# Patient Record
Sex: MALE | Race: WHITE | Employment: FULL TIME | URBAN - METROPOLITAN AREA
[De-identification: names, ages, dates, MRNs, and addresses within clinical notes are randomized per-mention and may not be internally consistent; named-entity substitution may affect disease eponyms.]

---

## 2019-03-25 ENCOUNTER — INITIAL CONSULT (OUTPATIENT)
Dept: SURGERY | Age: 39
End: 2019-03-25
Payer: COMMERCIAL

## 2019-03-25 VITALS
BODY MASS INDEX: 28.31 KG/M2 | SYSTOLIC BLOOD PRESSURE: 136 MMHG | DIASTOLIC BLOOD PRESSURE: 89 MMHG | HEIGHT: 72 IN | WEIGHT: 209 LBS | HEART RATE: 90 BPM

## 2019-03-25 DIAGNOSIS — K42.9 UMBILICAL HERNIA WITHOUT OBSTRUCTION AND WITHOUT GANGRENE: Primary | ICD-10-CM

## 2019-03-25 PROCEDURE — 99203 OFFICE O/P NEW LOW 30 MIN: CPT | Performed by: SURGERY

## 2019-03-26 ENCOUNTER — TELEPHONE (OUTPATIENT)
Dept: SURGERY | Age: 39
End: 2019-03-26

## 2019-04-11 RX ORDER — AMLODIPINE BESYLATE 10 MG/1
10 TABLET ORAL DAILY
COMMUNITY
End: 2020-12-09 | Stop reason: SDUPTHER

## 2019-04-11 RX ORDER — ESOMEPRAZOLE MAGNESIUM 20 MG/1
20 FOR SUSPENSION ORAL DAILY
COMMUNITY
End: 2020-11-16 | Stop reason: ALTCHOICE

## 2019-04-11 RX ORDER — MECLIZINE HCL 12.5 MG/1
12.5 TABLET ORAL DAILY
COMMUNITY
End: 2020-09-23 | Stop reason: ALTCHOICE

## 2019-04-11 RX ORDER — TRAMADOL HYDROCHLORIDE 50 MG/1
50 TABLET ORAL EVERY 6 HOURS PRN
COMMUNITY
End: 2020-09-23 | Stop reason: ALTCHOICE

## 2019-04-11 RX ORDER — ALLOPURINOL 100 MG/1
100 TABLET ORAL DAILY
COMMUNITY
End: 2020-12-09 | Stop reason: SDUPTHER

## 2019-04-11 RX ORDER — CITALOPRAM 10 MG/1
10 TABLET ORAL DAILY
COMMUNITY
End: 2020-09-23 | Stop reason: DRUGHIGH

## 2019-04-11 NOTE — PROGRESS NOTES
Obstructive Sleep Apnea (JUAN) Screening     Patient:  Gela Jean    YOB: 1980      Medical Record #:  [id number]                     Date:  4/11/2019     1. Are you a loud and/or regular snorer? []  Yes       [x] No    2. Have you been observed to gasp or stop breathing during sleep? []  Yes       [x] No    3. Do you feel tired or groggy upon awakening or do you awaken with a headache?           []  Yes       [x] No    4. Are you often tired or fatigued during the wake time hours? []  Yes       [x] No    5. Do you fall asleep sitting, reading, watching TV or driving? []  Yes       [x] No    6. Do you often have problems with memory or concentration? []  Yes       [x] No    **If patient's score is ? 3 they are considered high risk for JUAN. Notify the anesthesiologist of the high risk and document in focus note. Note:  If the patient's BMI is more than 35 kg m¯² , has neck circumference > 40 cm, and/or high blood pressure the risk is greater (© American [employer]nea Association, 2006).

## 2019-04-16 ENCOUNTER — ANESTHESIA EVENT (OUTPATIENT)
Dept: OPERATING ROOM | Age: 39
End: 2019-04-16
Payer: COMMERCIAL

## 2019-04-17 ENCOUNTER — ANESTHESIA (OUTPATIENT)
Dept: OPERATING ROOM | Age: 39
End: 2019-04-17
Payer: COMMERCIAL

## 2019-04-17 ENCOUNTER — HOSPITAL ENCOUNTER (OUTPATIENT)
Age: 39
Setting detail: OUTPATIENT SURGERY
Discharge: HOME | End: 2019-04-17
Attending: SURGERY | Admitting: SURGERY
Payer: COMMERCIAL

## 2019-04-17 VITALS
DIASTOLIC BLOOD PRESSURE: 72 MMHG | OXYGEN SATURATION: 89 % | TEMPERATURE: 96.3 F | SYSTOLIC BLOOD PRESSURE: 116 MMHG | RESPIRATION RATE: 8 BRPM

## 2019-04-17 VITALS
WEIGHT: 201.7 LBS | TEMPERATURE: 97.4 F | HEIGHT: 71 IN | RESPIRATION RATE: 16 BRPM | HEART RATE: 85 BPM | OXYGEN SATURATION: 95 % | SYSTOLIC BLOOD PRESSURE: 118 MMHG | BODY MASS INDEX: 28.24 KG/M2 | DIASTOLIC BLOOD PRESSURE: 78 MMHG

## 2019-04-17 DIAGNOSIS — Z09 S/P UMBILICAL HERNIA REPAIR, FOLLOW-UP EXAM: Primary | ICD-10-CM

## 2019-04-17 PROCEDURE — 49585 REPAIR UMBILICAL HERN,5+Y/O,REDUC: CPT | Performed by: SURGERY

## 2019-04-17 PROCEDURE — 7100000011 HC PHASE II RECOVERY - ADDTL 15 MIN: Performed by: SURGERY

## 2019-04-17 PROCEDURE — 2709999900 HC NON-CHARGEABLE SUPPLY: Performed by: SURGERY

## 2019-04-17 PROCEDURE — 7100000001 HC PACU RECOVERY - ADDTL 15 MIN: Performed by: SURGERY

## 2019-04-17 PROCEDURE — 6360000002 HC RX W HCPCS: Performed by: NURSE ANESTHETIST, CERTIFIED REGISTERED

## 2019-04-17 PROCEDURE — 7100000000 HC PACU RECOVERY - FIRST 15 MIN: Performed by: SURGERY

## 2019-04-17 PROCEDURE — 3600000014 HC SURGERY LEVEL 4 ADDTL 15MIN: Performed by: SURGERY

## 2019-04-17 PROCEDURE — 2500000003 HC RX 250 WO HCPCS: Performed by: SURGERY

## 2019-04-17 PROCEDURE — 6360000002 HC RX W HCPCS: Performed by: SURGERY

## 2019-04-17 PROCEDURE — 3700000000 HC ANESTHESIA ATTENDED CARE: Performed by: SURGERY

## 2019-04-17 PROCEDURE — 2580000003 HC RX 258: Performed by: ANESTHESIOLOGY

## 2019-04-17 PROCEDURE — 2500000003 HC RX 250 WO HCPCS: Performed by: NURSE ANESTHETIST, CERTIFIED REGISTERED

## 2019-04-17 PROCEDURE — 7100000010 HC PHASE II RECOVERY - FIRST 15 MIN: Performed by: SURGERY

## 2019-04-17 PROCEDURE — 93010 ELECTROCARDIOGRAM REPORT: CPT | Performed by: INTERNAL MEDICINE

## 2019-04-17 PROCEDURE — 93005 ELECTROCARDIOGRAM TRACING: CPT | Performed by: ANESTHESIOLOGY

## 2019-04-17 PROCEDURE — 2580000003 HC RX 258: Performed by: SURGERY

## 2019-04-17 PROCEDURE — 3700000001 HC ADD 15 MINUTES (ANESTHESIA): Performed by: SURGERY

## 2019-04-17 PROCEDURE — 3600000004 HC SURGERY LEVEL 4 BASE: Performed by: SURGERY

## 2019-04-17 RX ORDER — FENTANYL CITRATE 50 UG/ML
INJECTION, SOLUTION INTRAMUSCULAR; INTRAVENOUS PRN
Status: DISCONTINUED | OUTPATIENT
Start: 2019-04-17 | End: 2019-04-17 | Stop reason: SDUPTHER

## 2019-04-17 RX ORDER — MIDAZOLAM HYDROCHLORIDE 1 MG/ML
INJECTION INTRAMUSCULAR; INTRAVENOUS PRN
Status: DISCONTINUED | OUTPATIENT
Start: 2019-04-17 | End: 2019-04-17 | Stop reason: SDUPTHER

## 2019-04-17 RX ORDER — BUPIVACAINE HYDROCHLORIDE AND EPINEPHRINE 5; 5 MG/ML; UG/ML
INJECTION, SOLUTION PERINEURAL PRN
Status: DISCONTINUED | OUTPATIENT
Start: 2019-04-17 | End: 2019-04-17 | Stop reason: ALTCHOICE

## 2019-04-17 RX ORDER — DEXAMETHASONE SODIUM PHOSPHATE 4 MG/ML
INJECTION, SOLUTION INTRA-ARTICULAR; INTRALESIONAL; INTRAMUSCULAR; INTRAVENOUS; SOFT TISSUE PRN
Status: DISCONTINUED | OUTPATIENT
Start: 2019-04-17 | End: 2019-04-17 | Stop reason: SDUPTHER

## 2019-04-17 RX ORDER — SODIUM CHLORIDE 0.9 % (FLUSH) 0.9 %
10 SYRINGE (ML) INJECTION EVERY 12 HOURS SCHEDULED
Status: DISCONTINUED | OUTPATIENT
Start: 2019-04-17 | End: 2019-04-17 | Stop reason: HOSPADM

## 2019-04-17 RX ORDER — PROPOFOL 10 MG/ML
INJECTION, EMULSION INTRAVENOUS PRN
Status: DISCONTINUED | OUTPATIENT
Start: 2019-04-17 | End: 2019-04-17 | Stop reason: SDUPTHER

## 2019-04-17 RX ORDER — MEPERIDINE HYDROCHLORIDE 50 MG/ML
12.5 INJECTION INTRAMUSCULAR; INTRAVENOUS; SUBCUTANEOUS EVERY 5 MIN PRN
Status: DISCONTINUED | OUTPATIENT
Start: 2019-04-17 | End: 2019-04-17 | Stop reason: HOSPADM

## 2019-04-17 RX ORDER — OXYCODONE HYDROCHLORIDE AND ACETAMINOPHEN 5; 325 MG/1; MG/1
1 TABLET ORAL PRN
Status: DISCONTINUED | OUTPATIENT
Start: 2019-04-17 | End: 2019-04-17 | Stop reason: HOSPADM

## 2019-04-17 RX ORDER — LIDOCAINE HYDROCHLORIDE 20 MG/ML
INJECTION, SOLUTION INFILTRATION; PERINEURAL PRN
Status: DISCONTINUED | OUTPATIENT
Start: 2019-04-17 | End: 2019-04-17 | Stop reason: SDUPTHER

## 2019-04-17 RX ORDER — OXYCODONE HYDROCHLORIDE AND ACETAMINOPHEN 5; 325 MG/1; MG/1
2 TABLET ORAL PRN
Status: DISCONTINUED | OUTPATIENT
Start: 2019-04-17 | End: 2019-04-17 | Stop reason: HOSPADM

## 2019-04-17 RX ORDER — SODIUM CHLORIDE, SODIUM LACTATE, POTASSIUM CHLORIDE, CALCIUM CHLORIDE 600; 310; 30; 20 MG/100ML; MG/100ML; MG/100ML; MG/100ML
INJECTION, SOLUTION INTRAVENOUS CONTINUOUS
Status: DISCONTINUED | OUTPATIENT
Start: 2019-04-17 | End: 2019-04-17 | Stop reason: HOSPADM

## 2019-04-17 RX ORDER — HYDRALAZINE HYDROCHLORIDE 20 MG/ML
5 INJECTION INTRAMUSCULAR; INTRAVENOUS EVERY 10 MIN PRN
Status: DISCONTINUED | OUTPATIENT
Start: 2019-04-17 | End: 2019-04-17 | Stop reason: HOSPADM

## 2019-04-17 RX ORDER — ONDANSETRON 2 MG/ML
INJECTION INTRAMUSCULAR; INTRAVENOUS PRN
Status: DISCONTINUED | OUTPATIENT
Start: 2019-04-17 | End: 2019-04-17 | Stop reason: SDUPTHER

## 2019-04-17 RX ORDER — LIDOCAINE HYDROCHLORIDE 10 MG/ML
0.3 INJECTION, SOLUTION EPIDURAL; INFILTRATION; INTRACAUDAL; PERINEURAL
Status: DISCONTINUED | OUTPATIENT
Start: 2019-04-17 | End: 2019-04-17 | Stop reason: HOSPADM

## 2019-04-17 RX ORDER — ONDANSETRON 2 MG/ML
4 INJECTION INTRAMUSCULAR; INTRAVENOUS EVERY 10 MIN PRN
Status: DISCONTINUED | OUTPATIENT
Start: 2019-04-17 | End: 2019-04-17 | Stop reason: HOSPADM

## 2019-04-17 RX ORDER — LABETALOL HYDROCHLORIDE 5 MG/ML
5 INJECTION, SOLUTION INTRAVENOUS EVERY 10 MIN PRN
Status: DISCONTINUED | OUTPATIENT
Start: 2019-04-17 | End: 2019-04-17 | Stop reason: HOSPADM

## 2019-04-17 RX ORDER — 0.9 % SODIUM CHLORIDE 0.9 %
VIAL (ML) INJECTION PRN
Status: DISCONTINUED | OUTPATIENT
Start: 2019-04-17 | End: 2019-04-17 | Stop reason: ALTCHOICE

## 2019-04-17 RX ORDER — KETOROLAC TROMETHAMINE 30 MG/ML
INJECTION, SOLUTION INTRAMUSCULAR; INTRAVENOUS PRN
Status: DISCONTINUED | OUTPATIENT
Start: 2019-04-17 | End: 2019-04-17 | Stop reason: SDUPTHER

## 2019-04-17 RX ORDER — OXYCODONE HYDROCHLORIDE AND ACETAMINOPHEN 5; 325 MG/1; MG/1
1-2 TABLET ORAL EVERY 6 HOURS PRN
Qty: 20 TABLET | Refills: 0 | Status: SHIPPED | OUTPATIENT
Start: 2019-04-17 | End: 2019-04-22

## 2019-04-17 RX ORDER — SODIUM CHLORIDE 0.9 % (FLUSH) 0.9 %
10 SYRINGE (ML) INJECTION PRN
Status: DISCONTINUED | OUTPATIENT
Start: 2019-04-17 | End: 2019-04-17 | Stop reason: HOSPADM

## 2019-04-17 RX ADMIN — SODIUM CHLORIDE, POTASSIUM CHLORIDE, SODIUM LACTATE AND CALCIUM CHLORIDE: 600; 310; 30; 20 INJECTION, SOLUTION INTRAVENOUS at 08:22

## 2019-04-17 RX ADMIN — PROPOFOL 200 MG: 10 INJECTION, EMULSION INTRAVENOUS at 08:30

## 2019-04-17 RX ADMIN — KETOROLAC TROMETHAMINE 30 MG: 30 INJECTION, SOLUTION INTRAMUSCULAR; INTRAVENOUS at 08:58

## 2019-04-17 RX ADMIN — DEXAMETHASONE SODIUM PHOSPHATE 10 MG: 4 INJECTION, SOLUTION INTRAMUSCULAR; INTRAVENOUS at 08:39

## 2019-04-17 RX ADMIN — ONDANSETRON 4 MG: 2 INJECTION INTRAMUSCULAR; INTRAVENOUS at 08:58

## 2019-04-17 RX ADMIN — FENTANYL CITRATE 50 MCG: 50 INJECTION INTRAMUSCULAR; INTRAVENOUS at 08:30

## 2019-04-17 RX ADMIN — ONDANSETRON 4 MG: 2 INJECTION INTRAMUSCULAR; INTRAVENOUS at 08:39

## 2019-04-17 RX ADMIN — SODIUM CHLORIDE, POTASSIUM CHLORIDE, SODIUM LACTATE AND CALCIUM CHLORIDE: 600; 310; 30; 20 INJECTION, SOLUTION INTRAVENOUS at 07:08

## 2019-04-17 RX ADMIN — LIDOCAINE HYDROCHLORIDE 60 MG: 20 INJECTION, SOLUTION INFILTRATION; PERINEURAL at 08:30

## 2019-04-17 RX ADMIN — MIDAZOLAM HYDROCHLORIDE 2 MG: 2 INJECTION, SOLUTION INTRAMUSCULAR; INTRAVENOUS at 08:24

## 2019-04-17 RX ADMIN — Medication 2 G: at 08:30

## 2019-04-17 RX ADMIN — Medication 100 MCG: at 08:54

## 2019-04-17 RX ADMIN — SODIUM CHLORIDE, POTASSIUM CHLORIDE, SODIUM LACTATE AND CALCIUM CHLORIDE: 600; 310; 30; 20 INJECTION, SOLUTION INTRAVENOUS at 09:46

## 2019-04-17 RX ADMIN — Medication 100 MCG: at 08:57

## 2019-04-17 RX ADMIN — FENTANYL CITRATE 25 MCG: 50 INJECTION INTRAMUSCULAR; INTRAVENOUS at 08:50

## 2019-04-17 RX ADMIN — FENTANYL CITRATE 50 MCG: 50 INJECTION INTRAMUSCULAR; INTRAVENOUS at 08:44

## 2019-04-17 ASSESSMENT — PULMONARY FUNCTION TESTS
PIF_VALUE: 2
PIF_VALUE: 7
PIF_VALUE: 2
PIF_VALUE: 5
PIF_VALUE: 1
PIF_VALUE: 1
PIF_VALUE: 7
PIF_VALUE: 22
PIF_VALUE: 3
PIF_VALUE: 6
PIF_VALUE: 7
PIF_VALUE: 6
PIF_VALUE: 0
PIF_VALUE: 5
PIF_VALUE: 1
PIF_VALUE: 3
PIF_VALUE: 0
PIF_VALUE: 2
PIF_VALUE: 3
PIF_VALUE: 20
PIF_VALUE: 0
PIF_VALUE: 7
PIF_VALUE: 20
PIF_VALUE: 1
PIF_VALUE: 2
PIF_VALUE: 1
PIF_VALUE: 6
PIF_VALUE: 5
PIF_VALUE: 7
PIF_VALUE: 24
PIF_VALUE: 0
PIF_VALUE: 6
PIF_VALUE: 0
PIF_VALUE: 3
PIF_VALUE: 1
PIF_VALUE: 3
PIF_VALUE: 2
PIF_VALUE: 1
PIF_VALUE: 0
PIF_VALUE: 1
PIF_VALUE: 5

## 2019-04-17 ASSESSMENT — PAIN SCALES - GENERAL
PAINLEVEL_OUTOF10: 0
PAINLEVEL_OUTOF10: 0

## 2019-04-17 NOTE — ANESTHESIA PRE PROCEDURE
Department of Anesthesiology  Preprocedure Note       Name:  Jessica Gonzalez   Age:  41 y.o.  :  1980                                          MRN:  [id number]         Date:  2019      Surgeon: Ivan Galloway):  Marcela Daniel MD    Procedure: OPEN UMBILICAL HERNIA REPAIR, POSSIBLE MESH (N/A )    Medications prior to admission:   Prior to Admission medications    Medication Sig Start Date End Date Taking? Authorizing Provider   allopurinol (ZYLOPRIM) 100 MG tablet Take 100 mg by mouth daily   Yes Historical Provider, MD   meclizine (ANTIVERT) 12.5 MG tablet Take 12.5 mg by mouth daily   Yes Historical Provider, MD   esomeprazole Magnesium (NEXIUM) 20 MG PACK Take 20 mg by mouth daily   Yes Historical Provider, MD   amLODIPine (NORVASC) 10 MG tablet Take 10 mg by mouth daily   Yes Historical Provider, MD   citalopram (CELEXA) 10 MG tablet Take 10 mg by mouth daily   Yes Historical Provider, MD   traMADol (ULTRAM) 50 MG tablet Take 50 mg by mouth every 6 hours as needed for Pain. Yes Historical Provider, MD   methylphenidate (RITALIN) 10 MG tablet Take 10 mg by mouth 2 times daily. Yes Historical Provider, MD   simvastatin (ZOCOR) 10 MG tablet Take 10 mg by mouth nightly. Yes Historical Provider, MD   lisinopril (PRINIVIL;ZESTRIL) 10 MG tablet Take 40 mg by mouth daily     Historical Provider, MD       Current medications:    No current facility-administered medications for this encounter. Current Outpatient Medications   Medication Sig Dispense Refill    allopurinol (ZYLOPRIM) 100 MG tablet Take 100 mg by mouth daily      meclizine (ANTIVERT) 12.5 MG tablet Take 12.5 mg by mouth daily      esomeprazole Magnesium (NEXIUM) 20 MG PACK Take 20 mg by mouth daily      amLODIPine (NORVASC) 10 MG tablet Take 10 mg by mouth daily      citalopram (CELEXA) 10 MG tablet Take 10 mg by mouth daily      traMADol (ULTRAM) 50 MG tablet Take 50 mg by mouth every 6 hours as needed for Pain.       methylphenidate (RITALIN) 10 MG tablet Take 10 mg by mouth 2 times daily.  simvastatin (ZOCOR) 10 MG tablet Take 10 mg by mouth nightly.  lisinopril (PRINIVIL;ZESTRIL) 10 MG tablet Take 40 mg by mouth daily          Allergies: Allergies   Allergen Reactions    Iodine Swelling       Problem List:  There is no problem list on file for this patient. Past Medical History:        Diagnosis Date    ADD (attention deficit disorder with hyperactivity)     Hyperlipidemia     Hypertension     Renal disease     polycytic       Past Surgical History:        Procedure Laterality Date    HERNIA REPAIR      OTHER SURGICAL HISTORY Bilateral 5/15/13    BILATERAL LAPAROSCOPIC PREPERITONEAL INGUINAL HERNIA REPAIR    UPPER GASTROINTESTINAL ENDOSCOPY  3/15/2011    VASECTOMY      WISDOM TOOTH EXTRACTION         Social History:    Social History     Tobacco Use    Smoking status: Current Every Day Smoker     Packs/day: 0.50     Last attempt to quit: 3/15/2008     Years since quittin.0    Smokeless tobacco: Never Used   Substance Use Topics    Alcohol use: No                                Ready to quit: Not Answered  Counseling given: Not Answered      Vital Signs (Current):   Vitals:    19 1453   Weight: 200 lb (90.7 kg)   Height: 6' (1.829 m)                                              BP Readings from Last 3 Encounters:   19 136/89   05/15/13 114/77   13 102/71       NPO Status:                                                                                 BMI:   Wt Readings from Last 3 Encounters:   19 209 lb (94.8 kg)   05/15/13 182 lb (82.6 kg)   13 184 lb (83.5 kg)     Body mass index is 27.12 kg/m².     CBC:   Lab Results   Component Value Date    WBC 8.3 2013    RBC 4.82 2013    HGB 14.3 2013    HCT 42.2 2013    MCV 87.6 2013    RDW 13.3 2013     2013       CMP:   Lab Results   Component Value Date     05/02/2013    K 3.8 05/02/2013     05/02/2013    CO2 30 05/02/2013    BUN 22 05/02/2013    CREATININE 1.3 05/02/2013    GFRAA >60 05/02/2013    GLUCOSE 95 05/02/2013    PROT 7.0 05/02/2013    PROT 7.6 09/21/2011    CALCIUM 9.8 05/02/2013    BILITOT 0.56 05/02/2013    ALKPHOS 51 05/02/2013    AST 18 05/02/2013    ALT 17 05/02/2013       POC Tests: No results for input(s): POCGLU, POCNA, POCK, POCCL, POCBUN, POCHEMO, POCHCT in the last 72 hours. Coags: No results found for: PROTIME, INR, APTT    HCG (If Applicable): No results found for: PREGTESTUR, PREGSERUM, HCG, HCGQUANT     ABGs: No results found for: PHART, PO2ART, TBN1TJI, EUI7PLG, BEART, J0GSVLDF     Type & Screen (If Applicable):  No results found for: [facility]    Anesthesia Evaluation  Patient summary reviewed no history of anesthetic complications:   Airway: Mallampati: II  TM distance: >3 FB   Neck ROM: full  Mouth opening: > = 3 FB Dental: normal exam         Pulmonary:Negative Pulmonary ROS                              Cardiovascular:    (+) hypertension:,                   Neuro/Psych:   Negative Neuro/Psych ROS  (+) psychiatric history (ADD):            GI/Hepatic/Renal: Neg GI/Hepatic/Renal ROS       (-) GERD, liver disease and no renal disease       Endo/Other: Negative Endo/Other ROS       (-) diabetes mellitus               Abdominal:           Vascular: negative vascular ROS. Anesthesia Plan      general     ASA 2       Induction: intravenous. MIPS: Postoperative opioids intended and Prophylactic antiemetics administered. Anesthetic plan and risks discussed with patient and father. Plan discussed with CRNA. All questions answered and agrees with plan.         Delmar Xiong MD   4/16/2019

## 2019-04-17 NOTE — OP NOTE
Date of Surgery: 4/17/19    Preop Dx:  Umbilical Hernia, Reducible    Postop Dx:  Same    Procedure:  Umbilical Hernia Repair    Surgeon:  Juan Carlos Panchal    Assistant:      Anesthesia:  general    EBL:   <50ml    Specimen:  none    Complications: none    Drains/Lines:  none    Indications:  42 yo with umbilical hernia and some tenderness    Description:  Patient was given adequate description of the risks and rewards of the procedure, including bleeding, infection, injury to surrounding structures, possible bowel resection and freely consented. Pt was given appropriate antibiotics and brought to the OR where generalanesthesia was induced. Pt was placed in supine position. Prepped and draped in usual sterile fashion. A subumbilical incision was made with #15 blade and extended down to fascial level with electrocautery and blunt dissection. The hernia sac was circumferentially dissected with blunt dissection and then it and the umbilicus were transected without injury to structures within. The hernia defect was circumferentially dissected at the fascial level. The hernia sac and contents were reduced and the peritoneal surface was swept free of any adhesive tissue. Defect was about 1.5cm. Fascia closed with several 0-0 ethibond sutures. Valsalva maneuver was performed without issue. Wound was irrigated with normal saline and fluid suctioned out. Umbilicus was secured to the fascia with 3-0 vicryl suture. Subcutaneous tissue was closed with interrupted 3-0 vicryl suture. 4-0 monocryl used to close epidermis. Sterile dressing placed. All suture, sponge and instrument count correct times two at end of case. Transferred to PACU in stable condition.     Adriana Beasley MD

## 2019-04-17 NOTE — ANESTHESIA POSTPROCEDURE EVALUATION
Department of Anesthesiology  Postprocedure Note    Patient: Edi Gomez  MRN: [id number]  YOB: 1980  Date of evaluation: 4/17/2019  Time:  3:07 PM     Procedure Summary     Date:  04/17/19 Room / Location:  [place] [address] / [facility]    Anesthesia Start:  2978 Anesthesia Stop:  4694    Procedure:  OPEN UMBILICAL HERNIA REPAIR (N/A ) Diagnosis:       Umbilical hernia without obstruction and without gangrene      (UMBILICAL HERINA)    Surgeon:  Jason Berry MD Responsible Provider:  Mansoor Dickens MD    Anesthesia Type:  general ASA Status:  2          Anesthesia Type: general    Montserrat Phase I: Montserart Score: 9    Montserrat Phase II: Montserrat Score: 10    Last vitals: Reviewed and per EMR flowsheets.        Anesthesia Post Evaluation    Patient location during evaluation: PACU  Level of consciousness: awake  Airway patency: patent  Nausea & Vomiting: no nausea  Complications: no  Cardiovascular status: blood pressure returned to baseline  Respiratory status: acceptable  Hydration status: euvolemic

## 2019-04-17 NOTE — PROGRESS NOTES
Denies pain or nausea. Taking po. Patient reported that should not take\" Ibuprofen\" due to renal function. Spoke to Dr. Elizabeth Romero about pt received dose of Toradol in OR. States ensure patient is hydrated. IV fluids infusing. Patient informed about Toradol and instructed to continue with increased fluids today.

## 2019-04-18 LAB
EKG ATRIAL RATE: 74 BPM
EKG DIAGNOSIS: NORMAL
EKG P AXIS: 30 DEGREES
EKG P-R INTERVAL: 154 MS
EKG Q-T INTERVAL: 384 MS
EKG QRS DURATION: 88 MS
EKG QTC CALCULATION (BAZETT): 426 MS
EKG R AXIS: 19 DEGREES
EKG T AXIS: 18 DEGREES
EKG VENTRICULAR RATE: 74 BPM

## 2019-04-29 ENCOUNTER — OFFICE VISIT (OUTPATIENT)
Dept: SURGERY | Age: 39
End: 2019-04-29

## 2019-04-29 VITALS
BODY MASS INDEX: 28.44 KG/M2 | WEIGHT: 210 LBS | HEART RATE: 104 BPM | DIASTOLIC BLOOD PRESSURE: 92 MMHG | HEIGHT: 72 IN | SYSTOLIC BLOOD PRESSURE: 140 MMHG

## 2019-04-29 DIAGNOSIS — Z09 S/P UMBILICAL HERNIA REPAIR, FOLLOW-UP EXAM: Primary | ICD-10-CM

## 2019-04-29 PROCEDURE — 99024 POSTOP FOLLOW-UP VISIT: CPT | Performed by: SURGERY

## 2019-04-29 NOTE — PATIENT INSTRUCTIONS
Patient Education        Stopping Smoking: Care Instructions  Your Care Instructions  Cigarette smokers crave the nicotine in cigarettes. Giving it up is much harder than simply changing a habit. Your body has to stop craving the nicotine. It is hard to quit, but you can do it. There are many tools that people use to quit smoking. You may find that combining tools works best for you. There are several steps to quitting. First you get ready to quit. Then you get support to help you. After that, you learn new skills and behaviors to become a nonsmoker. For many people, a necessary step is getting and using medicine. Your doctor will help you set up the plan that best meets your needs. You may want to attend a smoking cessation program to help you quit smoking. When you choose a program, look for one that has proven success. Ask your doctor for ideas. You will greatly increase your chances of success if you take medicine as well as get counseling or join a cessation program.  Some of the changes you feel when you first quit tobacco are uncomfortable. Your body will miss the nicotine at first, and you may feel short-tempered and grumpy. You may have trouble sleeping or concentrating. Medicine can help you deal with these symptoms. You may struggle with changing your smoking habits and rituals. The last step is the tricky one: Be prepared for the smoking urge to continue for a time. This is a lot to deal with, but keep at it. You will feel better. Follow-up care is a key part of your treatment and safety. Be sure to make and go to all appointments, and call your doctor if you are having problems. It's also a good idea to know your test results and keep a list of the medicines you take. How can you care for yourself at home? · Ask your family, friends, and coworkers for support. You have a better chance of quitting if you have help and support.   · Join a support group, such as Nicotine Anonymous, for people who are nicotine. · Be prepared to keep trying. Most people are not successful the first few times they try to quit. Do not get mad at yourself if you smoke again. Make a list of things you learned and think about when you want to try again, such as next week, next month, or next year. Where can you learn more? Go to https://[employer]pako.[employer]. org and sign in to your [employer] account. Enter R303 in the [employer] box to learn more about \"Stopping Smoking: Care Instructions. \"     If you do not have an account, please click on the \"Sign Up Now\" link. Current as of: September 26, 2018  Content Version: 11.9  © 1226-2248 [employer]. Care instructions adapted under license by [facility]. If you have questions about a medical condition or this instruction, always ask your healthcare professional. [address] any warranty or liability for your use of this information.

## 2020-08-20 ENCOUNTER — APPOINTMENT (OUTPATIENT)
Dept: CT IMAGING | Age: 40
End: 2020-08-20
Payer: COMMERCIAL

## 2020-08-20 ENCOUNTER — HOSPITAL ENCOUNTER (EMERGENCY)
Age: 40
Discharge: HOME | End: 2020-08-21
Payer: COMMERCIAL

## 2020-08-20 ENCOUNTER — APPOINTMENT (OUTPATIENT)
Dept: ULTRASOUND IMAGING | Age: 40
End: 2020-08-20
Payer: COMMERCIAL

## 2020-08-20 LAB
A/G RATIO: 1.4 (ref 1.1–2.2)
ALBUMIN SERPL-MCNC: 4.6 G/DL (ref 3.4–5)
ALP BLD-CCNC: 80 U/L (ref 40–129)
ALT SERPL-CCNC: 19 U/L (ref 10–40)
ANION GAP SERPL CALCULATED.3IONS-SCNC: 15 MMOL/L (ref 3–16)
AST SERPL-CCNC: 20 U/L (ref 15–37)
BACTERIA: ABNORMAL /HPF
BASOPHILS ABSOLUTE: 0.1 K/UL (ref 0–0.2)
BASOPHILS RELATIVE PERCENT: 0.9 %
BILIRUB SERPL-MCNC: <0.2 MG/DL (ref 0–1)
BILIRUBIN URINE: NEGATIVE
BLOOD, URINE: ABNORMAL
BUN BLDV-MCNC: 24 MG/DL (ref 7–20)
CALCIUM SERPL-MCNC: 9.7 MG/DL (ref 8.3–10.6)
CHLORIDE BLD-SCNC: 100 MMOL/L (ref 99–110)
CLARITY: CLEAR
CO2: 21 MMOL/L (ref 21–32)
COLOR: ABNORMAL
CREAT SERPL-MCNC: 3 MG/DL (ref 0.9–1.3)
EOSINOPHILS ABSOLUTE: 0.1 K/UL (ref 0–0.6)
EOSINOPHILS RELATIVE PERCENT: 0.7 %
EPITHELIAL CELLS, UA: ABNORMAL /HPF (ref 0–5)
GFR AFRICAN AMERICAN: 28
GFR NON-AFRICAN AMERICAN: 23
GLOBULIN: 3.3 G/DL
GLUCOSE BLD-MCNC: 122 MG/DL (ref 70–99)
GLUCOSE URINE: NEGATIVE MG/DL
HCT VFR BLD CALC: 41 % (ref 40.5–52.5)
HEMOGLOBIN: 14 G/DL (ref 13.5–17.5)
KETONES, URINE: NEGATIVE MG/DL
LEUKOCYTE ESTERASE, URINE: ABNORMAL
LIPASE: 75 U/L (ref 13–60)
LYMPHOCYTES ABSOLUTE: 1.6 K/UL (ref 1–5.1)
LYMPHOCYTES RELATIVE PERCENT: 20.6 %
MCH RBC QN AUTO: 28.5 PG (ref 26–34)
MCHC RBC AUTO-ENTMCNC: 34.2 G/DL (ref 31–36)
MCV RBC AUTO: 83.4 FL (ref 80–100)
MICROSCOPIC EXAMINATION: YES
MONOCYTES ABSOLUTE: 0.5 K/UL (ref 0–1.3)
MONOCYTES RELATIVE PERCENT: 6.9 %
NEUTROPHILS ABSOLUTE: 5.5 K/UL (ref 1.7–7.7)
NEUTROPHILS RELATIVE PERCENT: 70.9 %
NITRITE, URINE: NEGATIVE
PDW BLD-RTO: 13.8 % (ref 12.4–15.4)
PH UA: 7 (ref 5–8)
PLATELET # BLD: 334 K/UL (ref 135–450)
PMV BLD AUTO: 8.3 FL (ref 5–10.5)
POTASSIUM SERPL-SCNC: 4.1 MMOL/L (ref 3.5–5.1)
PROTEIN UA: 100 MG/DL
RBC # BLD: 4.91 M/UL (ref 4.2–5.9)
RBC UA: ABNORMAL /HPF (ref 0–4)
SODIUM BLD-SCNC: 136 MMOL/L (ref 136–145)
SPECIFIC GRAVITY UA: 1.01 (ref 1–1.03)
TOTAL PROTEIN: 7.9 G/DL (ref 6.4–8.2)
URINE TYPE: ABNORMAL
UROBILINOGEN, URINE: 0.2 E.U./DL
WBC # BLD: 7.8 K/UL (ref 4–11)
WBC UA: ABNORMAL /HPF (ref 0–5)

## 2020-08-20 PROCEDURE — 83690 ASSAY OF LIPASE: CPT

## 2020-08-20 PROCEDURE — 76705 ECHO EXAM OF ABDOMEN: CPT

## 2020-08-20 PROCEDURE — 81001 URINALYSIS AUTO W/SCOPE: CPT

## 2020-08-20 PROCEDURE — 87086 URINE CULTURE/COLONY COUNT: CPT

## 2020-08-20 PROCEDURE — 99284 EMERGENCY DEPT VISIT MOD MDM: CPT

## 2020-08-20 PROCEDURE — 80053 COMPREHEN METABOLIC PANEL: CPT

## 2020-08-20 PROCEDURE — 85025 COMPLETE CBC W/AUTO DIFF WBC: CPT

## 2020-08-20 PROCEDURE — 74176 CT ABD & PELVIS W/O CONTRAST: CPT

## 2020-08-20 ASSESSMENT — PAIN DESCRIPTION - ORIENTATION: ORIENTATION: UPPER;RIGHT

## 2020-08-20 ASSESSMENT — PAIN DESCRIPTION - PAIN TYPE: TYPE: ACUTE PAIN

## 2020-08-20 ASSESSMENT — PAIN DESCRIPTION - DESCRIPTORS: DESCRIPTORS: SHARP

## 2020-08-20 ASSESSMENT — PAIN DESCRIPTION - LOCATION: LOCATION: ABDOMEN

## 2020-08-20 ASSESSMENT — PAIN DESCRIPTION - FREQUENCY: FREQUENCY: INTERMITTENT

## 2020-08-20 ASSESSMENT — PAIN SCALES - GENERAL: PAINLEVEL_OUTOF10: 7

## 2020-08-20 ASSESSMENT — PAIN DESCRIPTION - PROGRESSION: CLINICAL_PROGRESSION: GRADUALLY WORSENING

## 2020-08-20 ASSESSMENT — PAIN DESCRIPTION - ONSET: ONSET: GRADUAL

## 2020-08-21 VITALS
HEIGHT: 72 IN | BODY MASS INDEX: 28.44 KG/M2 | TEMPERATURE: 99.1 F | SYSTOLIC BLOOD PRESSURE: 134 MMHG | HEART RATE: 79 BPM | OXYGEN SATURATION: 99 % | RESPIRATION RATE: 16 BRPM | WEIGHT: 210 LBS | DIASTOLIC BLOOD PRESSURE: 74 MMHG

## 2020-08-21 LAB — URINE CULTURE, ROUTINE: NORMAL

## 2020-08-21 RX ORDER — PREDNISONE 10 MG/1
40 TABLET ORAL DAILY
Qty: 16 TABLET | Refills: 0 | Status: SHIPPED | OUTPATIENT
Start: 2020-08-21 | End: 2020-08-25

## 2020-08-21 RX ORDER — CEFDINIR 300 MG/1
300 CAPSULE ORAL ONCE
Status: DISCONTINUED | OUTPATIENT
Start: 2020-08-21 | End: 2020-08-21 | Stop reason: HOSPADM

## 2020-08-21 RX ORDER — PREDNISONE 20 MG/1
60 TABLET ORAL ONCE
Status: DISCONTINUED | OUTPATIENT
Start: 2020-08-21 | End: 2020-08-21 | Stop reason: HOSPADM

## 2020-08-21 RX ORDER — CEFDINIR 300 MG/1
300 CAPSULE ORAL 2 TIMES DAILY
Qty: 20 CAPSULE | Refills: 0 | Status: SHIPPED | OUTPATIENT
Start: 2020-08-21 | End: 2020-08-31

## 2020-08-21 ASSESSMENT — PAIN SCALES - GENERAL: PAINLEVEL_OUTOF10: 3

## 2020-08-21 NOTE — ED PROVIDER NOTES
Evaluated by [id number] [facility] Provider    [facility]  ED    CHIEF COMPLAINT  Abdominal Pain (Right upper since 8/8 / +nausea, +emesis / reports similar episodes and was told that it was gallstones)    HISTORY OF PRESENT ILLNESS  Phuong Ocampo is a 45 y.o. male who presents to the ED complaining of abdominal pain. Reports pain in the RUQ for a couple of weeks. Having issues with eating, not even finished with eating and he starts coughing and pain gets worse. Years ago he had similar issues and was told it could have been due to gall stones. He has polycystic kidney disease. Thinks his most recent creatinine was 2.7 a few months ago. Denies dysuria. Reports nausea and vomiting with the pain. Denies fevers, chills, sweats, diarrhea. Denies CP or SOB. The patient is currently rating their pain as 7/10 and describes it as an aching type of pain. Treatments tried prior to arrival in the ED: none. The patient denies other complaints, modifying factors or associated symptoms. The patient arrived to the ED via private car.     PAST MEDICAL HISTORY    Past Medical History:   Diagnosis Date    ADD (attention deficit disorder with hyperactivity)     Hyperlipidemia     Hypertension     Renal disease     polycytic       SURGICAL HISTORY    Past Surgical History:   Procedure Laterality Date    HERNIA REPAIR      OTHER SURGICAL HISTORY Bilateral 5/15/13    BILATERAL LAPAROSCOPIC PREPERITONEAL INGUINAL HERNIA REPAIR    UMBILICAL HERNIA REPAIR N/A 9/15/2864    OPEN UMBILICAL HERNIA REPAIR performed by Len Jang MD at [address]  3/15/2011    VASECTOMY      WISDOM TOOTH EXTRACTION         CURRENT MEDICATIONS    Current Outpatient Rx   Medication Sig Dispense Refill    predniSONE (DELTASONE) 10 MG tablet Take 4 tablets by mouth daily for 4 days 16 tablet 0    cefdinir (OMNICEF) 300 MG capsule Take 1 capsule by mouth 2 times daily for 10 days 20 capsule 0    allopurinol (ZYLOPRIM) 100 MG tablet Take 100 mg by mouth daily      esomeprazole Magnesium (NEXIUM) 20 MG PACK Take 20 mg by mouth daily      amLODIPine (NORVASC) 10 MG tablet Take 10 mg by mouth daily      citalopram (CELEXA) 10 MG tablet Take 10 mg by mouth daily      methylphenidate (RITALIN) 10 MG tablet Take 10 mg by mouth 2 times daily.  simvastatin (ZOCOR) 10 MG tablet Take 10 mg by mouth nightly.  lisinopril (PRINIVIL;ZESTRIL) 10 MG tablet Take 40 mg by mouth daily       meclizine (ANTIVERT) 12.5 MG tablet Take 12.5 mg by mouth daily      traMADol (ULTRAM) 50 MG tablet Take 50 mg by mouth every 6 hours as needed for Pain.          ALLERGIES    Allergies   Allergen Reactions    Hydrocodone Itching    Iodine Swelling    Nsaids      Does not take due to kidney function issues       FAMILY HISTORY    Family History   Problem Relation Age of Onset    Kidney Disease Mother     Heart Disease Mother         CHF       SOCIAL HISTORY    Social History     Socioeconomic History    Marital status:      Spouse name: None    Number of children: None    Years of education: None    Highest education level: None   Occupational History    None   Social Needs    Financial resource strain: None    Food insecurity     Worry: None     Inability: None    Transportation needs     Medical: None     Non-medical: None   Tobacco Use    Smoking status: Current Every Day Smoker     Packs/day: 0.00     Types: Cigarettes     Last attempt to quit: 3/15/2008     Years since quittin.4    Smokeless tobacco: Never Used    Tobacco comment: 1 pack per week   Substance and Sexual Activity    Alcohol use: No     Comment: rarely    Drug use: Not Currently    Sexual activity: None   Lifestyle    Physical activity     Days per week: None     Minutes per session: None    Stress: None   Relationships    Social connections     Talks on phone: None     Gets together: None     Attends Anabaptist service: None     Active member of club or organization: None     Attends meetings of clubs or organizations: None     Relationship status: None    Intimate partner violence     Fear of current or ex partner: None     Emotionally abused: None     Physically abused: None     Forced sexual activity: None   Other Topics Concern    None   Social History Narrative    None       REVIEW OFSYSTEMS    10systems reviewed, pertinent positives per HPI otherwise noted to be negative. PHYSICAL EXAM  Physical Exam  Vitals:    08/20/20 2246   BP: (!) 152/118   Pulse: 82   Resp: 16   Temp:    SpO2: 99%       GENERAL: Patient is well-developed, well-nourished. Awake andalert. Cooperative. Resting in bed. No apparent distress. HEENT:  Normocephalic, atraumatic. Conjunctivaappear normal. Sclera is non-icteric. External ears are normal.    NECK: Supple with normal ROM. Tracheamidline  LUNGS: Equal and symmetric chest rise. Breathing is unlabored. Speaking comfortably in fullsentences. Lungs are clear bilaterally to auscultation. Without wheezing, rales, or rhonchi. CADIOVASCULAR:  Regular rate and rhythm. Normal S1-S2 sounds. No murmurs, rubs, or gallops. GI: Soft, RUQ abdominal pain, nondistended with positive bowel sounds. No rebound tenderness, guarding or rigidity. Positive Modi's sign. NEgative Rovsing's sign. No CVAT to palpation. No masses or hepatosplenomegaly to palpation. MUSCULOSKELETAL:  No gross deformities or trauma noted. Moving all extremities equally and appropriately. Normal ROM. SKIN: Warm/dry. Skin is intact. No rashes or lesions noted. PSYCHIATRIC: Mood and affect appropriate. Speech is clear and articulate. NEUROLOGIC: Alert and oriented. No focal motor or sensory deficits.  Gait was observed and is normal.    LABS   Results for orders placed or performed during the hospital encounter of 08/20/20   CBC auto differential   Result Value Ref Range    WBC 7.8 4.0 - 11.0 K/uL    RBC 4. 91 4.20 - 5.90 M/uL    Hemoglobin 14.0 13.5 - 17.5 g/dL    Hematocrit 41.0 40.5 - 52.5 %    MCV 83.4 80.0 - 100.0 fL    MCH 28.5 26.0 - 34.0 pg    MCHC 34.2 31.0 - 36.0 g/dL    RDW 13.8 12.4 - 15.4 %    Platelets 180 549 - 429 K/uL    MPV 8.3 5.0 - 10.5 fL    Neutrophils % 70.9 %    Lymphocytes % 20.6 %    Monocytes % 6.9 %    Eosinophils % 0.7 %    Basophils % 0.9 %    Neutrophils Absolute 5.5 1.7 - 7.7 K/uL    Lymphocytes Absolute 1.6 1.0 - 5.1 K/uL    Monocytes Absolute 0.5 0.0 - 1.3 K/uL    Eosinophils Absolute 0.1 0.0 - 0.6 K/uL    Basophils Absolute 0.1 0.0 - 0.2 K/uL   Comprehensive metabolic panel   Result Value Ref Range    Sodium 136 136 - 145 mmol/L    Potassium 4.1 3.5 - 5.1 mmol/L    Chloride 100 99 - 110 mmol/L    CO2 21 21 - 32 mmol/L    Anion Gap 15 3 - 16    Glucose 122 (H) 70 - 99 mg/dL    BUN 24 (H) 7 - 20 mg/dL    CREATININE 3.0 (H) 0.9 - 1.3 mg/dL    GFR Non-African American 23 (A) >60    GFR  28 (A) >60    Calcium 9.7 8.3 - 10.6 mg/dL    Total Protein 7.9 6.4 - 8.2 g/dL    Alb 4.6 3.4 - 5.0 g/dL    Albumin/Globulin Ratio 1.4 1.1 - 2.2    Total Bilirubin <0.2 0.0 - 1.0 mg/dL    Alkaline Phosphatase 80 40 - 129 U/L    ALT 19 10 - 40 U/L    AST 20 15 - 37 U/L    Globulin 3.3 g/dL   Lipase   Result Value Ref Range    Lipase 75.0 (H) 13.0 - 60.0 U/L   Urinalysis   Result Value Ref Range    Color, UA Straw Straw/Yellow    Clarity, UA Clear Clear    Glucose, Ur Negative Negative mg/dL    Bilirubin Urine Negative Negative    Ketones, Urine Negative Negative mg/dL    Specific Gravity, UA 1.015 1.005 - 1.030    Blood, Urine SMALL (A) Negative    pH, UA 7.0 5.0 - 8.0    Protein,  (A) Negative mg/dL    Urobilinogen, Urine 0.2 <2.0 E.U./dL    Nitrite, Urine Negative Negative    Leukocyte Esterase, Urine MODERATE (A) Negative    Microscopic Examination YES     Urine Type NotGiven    Microscopic Urinalysis   Result Value Ref Range    WBC, UA 21-50 (A) 0 - 5 /HPF    RBC, UA 5-10 (A) 0 - Polycystic kidney disease. The kidneys are markedly enlarged, increasing in size from 12/19/2009. There are scattered small hepatic cysts. Helical fasteners in the lower pelvis from prior inguinal hernia surgeries. Us Gallbladder Ruq    Result Date: 8/21/2020  EXAMINATION: RIGHT UPPER QUADRANT ULTRASOUND 8/20/2020 11:02 pm COMPARISON: CT abdomen/pelvis 08/20/2020 HISTORY: ORDERING SYSTEM PROVIDED HISTORY: RUQ pain, + modi's sign TECHNOLOGIST PROVIDED HISTORY: Reason for exam:->RUQ pain, + modi's sign Reason for Exam: ruq pain Acuity: Acute FINDINGS: LIVER:  There are scattered small liver cysts. BILIARY SYSTEM:  Gallbladder is unremarkable without evidence of pericholecystic fluid, wall thickening or stones. Negative sonographic Modi's sign. Common bile duct is within normal limits measuring 3.5 mm. RIGHT KIDNEY: The right kidney is markedly enlarged with innumerable cysts. There is a history of polycystic kidney disease. PANCREAS:  Pancreas was not visualized. OTHER: No evidence of right upper quadrant ascites. No evidence of cholelithiasis. Negative sonographic Modi sign. There are markedly enlarged polycystic kidneys better demonstrated on the concurrent CT abdomen/pelvis. ED COURSE/MDM  Patient seen and evaluated. Old records reviewed. Diagnostic testing reviewed and results discussed. I have evaluated this patient. My supervising physician was available for consultation. Aleksey Olvera presented to the ED today with above noted complaints. Patient is afebrile and hemodynamically stable except for his blood pressure is elevated at 152/118. Patient does not have evidence of endorgan damage at this point and I do feel he can follow-up with primary for further management. Physical exam does reveal right upper quadrant abdominal tenderness to palpation, he does have positive Modi sign on my exam.  Blood work does not show evidence of systemic infection. No anemia.   CMP without electrolyte abnormality. No evidence of transaminitis. There is an elevated BUN and creatinine of 24/3.0. Patient does have polycystic kidney disease and reports he has known chronic kidney disease as a result of this. Patient reports his most recent creatinine a few months ago was 2.7. Do not feel that this is acute kidney injury. Lipase is slightly elevated at 75. I did obtain a CT of the abdomen and pelvis and this shows no acute findings in the abdomen or pelvis. Polycystic kidney disease. Kidneys are markedly enlarged from scan in 2009. There are scattered small hepatic cyst.  Ultrasound of the gallbladder shows no evidence of Whit lithiasis. Negative sonographic Modi sign. Markedly enlarged polycystic kidneys. UA shows findings consistent with urinary tract infection. Culture will be obtained. Patient will be started on omnicef. He did state that when he is put on antibiotics he does need to be put on steroids to help them work better. I will start him on a prednisone burst.    Pt was given the following medications or treatments in the ED:   Medications   cefdinir (OMNICEF) capsule 300 mg (has no administration in time range)   predniSONE (DELTASONE) tablet 60 mg (has no administration in time range)     I advised the patient of the above findings. He does need to follow-up with nephrology, he is from [facility] and recently moved here. I am giving him a referral for PCP and nephrology so he can establish with local doctors. At this point I do not feel the patient requires further work upand it is reasonable to discharge the patient. Please refer to AVS for further details regarding discharge instructions. A discussion was had with the patient regarding diagnosis, diagnostic testing results,treatment/ plan of care, and follow up. All questions were answered. Patient will follow up as directed for further evaluation/treatment.  The patient was given strict return precautions as we discussed symptoms that wouldnecessitate return to the ED. Patient will return to ED for new/worsening symptoms. The patient verbalized their understanding and agreement with the above plan. Patient was sent home with a prescription for below medication/s. I did [place] patient on appropriate use of these medication. New Prescriptions    CEFDINIR (OMNICEF) 300 MG CAPSULE    Take 1 capsule by mouth 2 times daily for 10 days    PREDNISONE (DELTASONE) 10 MG TABLET    Take 4 tablets by mouth daily for 4 days       I estimatethere is LOW risk for ACUTE APPENDICITIS, BOWEL OBSTRUCTION, CHOLECYSTITIS, DIVERTICULITIS, INCARCERATED HERNIA, PANCREATITIS, or PERFORATED BOWEL or ULCER, thus I consider the discharge disposition reasonable. Also, thereis no evidence or peritonitis, sepsis, or toxicity. Carlos A Pardo and I have discussed the diagnosis and risks, and we agree with discharging home to follow-up with their primary doctor. We also discussed returning to the EmergencyDepartment immediately if new or worsening symptoms occur. We have discussed the symptoms which are most concerning (e.g., bloody stool, fever, changing or worsening pain, vomiting) that necessitate immediate return. CLINICAL IMPRESSION    1. Right upper quadrant abdominal pain    2. Polycystic kidney disease    3. Elevated serum creatinine    4. Elevated blood pressure reading           Discharge Vitals:  Blood pressure (!) 152/118, pulse 82, temperature 99.1 °F (37.3 °C), temperature source Oral, resp. rate 16, height 6' (1.829 m), weight 210 lb (95.3 kg), SpO2 99 %.     FOLLOW UP  Eric Rocha MD  [address] [facility],[address], [address] [address] [address] [facility] [address]  [phone]    Call in 1 day  For further evaluation    [facility]  ED  [address] [facility] [address] [facility]  Go to   If symptoms worsen    [address]  Patient was discharged to home in good condition. Comment: Pleasenote this report has been produced using speech recognition software and may contain errors related to that system including errors in grammar, punctuation, and spelling, as well as words and phrases that may beinappropriate. If there are any questions or concerns please feel free to contact the dictating provider for clarification.         Jared Christianson, ANA - CNP  08/21/20 0045

## 2020-08-21 NOTE — ED NOTES
PT complaining of pain with IV. IV removed with the understanding of if he needs IV medications or testing another one will be inserted. Pt verbalized understanding.       Neela Daniels, RN  08/20/20 3004

## 2020-08-21 NOTE — ED NOTES
Discharge instructions, medications and follow up discussed with patient. PT to follow up with nephrology for polycystic kidneys. Verbal understanding given. Pt ambulated out of ED in stable condition.       Lucrecia Milian, ROSEANN  08/21/20 0647

## 2020-09-23 ENCOUNTER — VIRTUAL VISIT (OUTPATIENT)
Dept: FAMILY MEDICINE CLINIC | Age: 40
End: 2020-09-23
Payer: COMMERCIAL

## 2020-09-23 PROCEDURE — 99203 OFFICE O/P NEW LOW 30 MIN: CPT | Performed by: FAMILY MEDICINE

## 2020-09-23 RX ORDER — CITALOPRAM 10 MG/1
10 TABLET ORAL DAILY
Qty: 30 TABLET | Refills: 5 | Status: SHIPPED | OUTPATIENT
Start: 2020-09-23 | End: 2020-12-09 | Stop reason: SDUPTHER

## 2020-09-23 ASSESSMENT — PATIENT HEALTH QUESTIONNAIRE - PHQ9
SUM OF ALL RESPONSES TO PHQ9 QUESTIONS 1 & 2: 0
SUM OF ALL RESPONSES TO PHQ QUESTIONS 1-9: 0
2. FEELING DOWN, DEPRESSED OR HOPELESS: 0
1. LITTLE INTEREST OR PLEASURE IN DOING THINGS: 0
SUM OF ALL RESPONSES TO PHQ QUESTIONS 1-9: 0

## 2020-11-12 ENCOUNTER — TELEPHONE (OUTPATIENT)
Dept: FAMILY MEDICINE CLINIC | Age: 40
End: 2020-11-12

## 2020-11-12 NOTE — TELEPHONE ENCOUNTER
----- Message from Lafonda Sacks sent at 11/12/2020  2:23 PM EST -----  Subject: Message to Provider    QUESTIONS  Information for Provider? pt needs request form for medical history sent   to Dr Alin Davies in [facility] so that his medical history can be sent   back to us  ---------------------------------------------------------------------------  --------------  [address]  What is the best way for the office to contact you? OK to leave message on   voicemail  Preferred Call Back Phone Number? [phone]  ---------------------------------------------------------------------------  --------------  SCRIPT ANSWERS  Relationship to Patient?  Self

## 2020-11-12 NOTE — TELEPHONE ENCOUNTER
----- Message from Stephany Leigh sent at 11/12/2020 11:58 AM EST -----  Subject: Message to Provider    QUESTIONS  Information for Provider? Pt is currently working out of town and would   like to know if his pcp could prescribe meds for a UTI along with   steroids. Please contact pt to get pharmacy info.  ---------------------------------------------------------------------------  --------------  [address]  What is the best way for the office to contact you? OK to leave message on   voicemail  Preferred Call Back Phone Number? [phone]  ---------------------------------------------------------------------------  --------------  SCRIPT ANSWERS  Relationship to Patient?  Self

## 2020-11-12 NOTE — TELEPHONE ENCOUNTER
Called pt and advised that we can not send a record request without it being signed by patient. He states he is in [place] until January. I advised we can wait and do it then.      Pt still requesting medication for UTI

## 2020-11-14 NOTE — TELEPHONE ENCOUNTER
As per [facility], I need to see pt for antibiotic prescriptions. He can schedule a VV, telephone visit,  or do an E-visit. He may need to go to urgent care. Thank you.

## 2020-11-14 NOTE — TELEPHONE ENCOUNTER
LMTCB to advise patient about making an appointment for any medication to bve called in or he can go to an urgent care

## 2020-11-16 ENCOUNTER — NURSE TRIAGE (OUTPATIENT)
Dept: OTHER | Facility: CLINIC | Age: 40
End: 2020-11-16

## 2020-11-16 ENCOUNTER — VIRTUAL VISIT (OUTPATIENT)
Dept: FAMILY MEDICINE CLINIC | Age: 40
End: 2020-11-16
Payer: COMMERCIAL

## 2020-11-16 PROCEDURE — 99214 OFFICE O/P EST MOD 30 MIN: CPT | Performed by: NURSE PRACTITIONER

## 2020-11-16 RX ORDER — CEFDINIR 300 MG/1
300 CAPSULE ORAL 2 TIMES DAILY
Qty: 10 CAPSULE | Refills: 0 | Status: SHIPPED | OUTPATIENT
Start: 2020-11-16 | End: 2020-11-21

## 2020-11-16 RX ORDER — METHYLPREDNISOLONE 4 MG/1
TABLET ORAL
Qty: 1 KIT | Refills: 0 | Status: SHIPPED | OUTPATIENT
Start: 2020-11-16 | End: 2020-11-22

## 2020-11-16 ASSESSMENT — ENCOUNTER SYMPTOMS
RESPIRATORY NEGATIVE: 1
SHORTNESS OF BREATH: 0
WHEEZING: 0
COUGH: 0

## 2020-11-16 ASSESSMENT — PATIENT HEALTH QUESTIONNAIRE - PHQ9
1. LITTLE INTEREST OR PLEASURE IN DOING THINGS: 0
SUM OF ALL RESPONSES TO PHQ9 QUESTIONS 1 & 2: 0
SUM OF ALL RESPONSES TO PHQ QUESTIONS 1-9: 0
SUM OF ALL RESPONSES TO PHQ QUESTIONS 1-9: 0
2. FEELING DOWN, DEPRESSED OR HOPELESS: 0
SUM OF ALL RESPONSES TO PHQ QUESTIONS 1-9: 0

## 2020-11-16 NOTE — PROGRESS NOTES
2020    TELEHEALTH EVALUATION -- Audio/Visual (During NR- public health emergency)    HPI:    Genevieve Espino (:  1980) has requested an audio/video evaluation for the following concern(s):UTI, PKD    Mr. Warden Harris is a 69-year-old patient of Dr. Jason Lewis. He was last seen by Dr. Debora Avila on  continue with ADHD, anxiety and depression, polycystic kidney disease referral to Dr. Schuler nephrologist in [facility]. Of GERD, hypertension BP readings are 130/90 he is on amlodipine and lisinopril. Ports from Dr. Silvia Pierre in the chart. Pain to the patient he needs further tests performed. Patient calls today from his car he is in [place] stating he has another Urinary tract infection needs to have his antibiotic and prednisone refilled. A question him about the use of prednisone and treatment of Urinary tract infection and he said he absolutely has to have it or his Urinary tract infection reoccurs. Is nothing documented in the chart as to why he would be given an antibiotic and prednisone for Urinary tract infection. Was seen in the emergency room on  for complaints of abdominal pain nausea, poor appetite and a cough. Denied any dysuria at this time. Last creatinine was 3.0 GFR was 23 at that visit but a moderate amount of leukocytes small amount of blood negative for ketones and nitrates. Culture was obtained and he was treated for a Urinary tract infection and discharged. He was placed on Omnicef and a Medrol Dosepak. He had this discussion with the ER of being on antibiotic and steroids for Urinary tract infection and they also agreed that this was not typical treatment. Blood pressure in the ER was 152/118. I could not find anything in his record of about why steroids would be effective for his particular polycystic kidney disease and treatment of his Urinary tract infection.   I called Bertha Clifton who stated yes the patient told him this but he did not understand why that would be ordered either. He said he would order it and have the patient make an appointment with him as soon as he gets into the city and we can discuss this in depth at his appointment. I told Mr. Earle Castano what Dr. Berto Grider had stated and wanted him to be seen in the office. Mr. Earle Castano will be in the city sometime around December 18 he will make an appointment with Dr. Christelle Esqueda. Cefdinir and Medrol Dosepak. Review of Systems   Respiratory: Negative. Negative for cough, shortness of breath and wheezing. Smoker   Cardiovascular: Negative. Negative for chest pain, palpitations and leg swelling. Hypertension highest reading 152/118.-Lisinopril and Norvasc    Hyperlipidemia-total 205 HDL 37  triglycerides 285 in 2011 on Zocor   Genitourinary:        History of renal disease, polycystic kidney disease, GFR 24 with a creatinine of 3.0. History of frequent UTIs   Psychiatric/Behavioral: Negative for dysphoric mood and self-injury. You have ADHD-Ritalin       Prior to Visit Medications    Medication Sig Taking? Authorizing Provider   cefdinir (OMNICEF) 300 MG capsule Take 1 capsule by mouth 2 times daily for 5 days Yes ANA Stoner CNP   methylPREDNISolone (MEDROL DOSEPACK) 4 MG tablet Take by mouth. Yes ANA Stoner CNP   dexlansoprazole (DEXILANT) 30 MG CPDR delayed release capsule Take 30 mg by mouth daily Yes Arnold Ganser, DO   citalopram (CELEXA) 10 MG tablet Take 1 tablet by mouth daily Yes Arnold Ganser, DO   allopurinol (ZYLOPRIM) 100 MG tablet Take 100 mg by mouth daily Yes Historical Provider, MD   amLODIPine (NORVASC) 10 MG tablet Take 10 mg by mouth daily Yes Historical Provider, MD   simvastatin (ZOCOR) 10 MG tablet Take 10 mg by mouth nightly.    Yes Historical Provider, MD   lisinopril (PRINIVIL;ZESTRIL) 10 MG tablet Take 40 mg by mouth daily  Yes Historical Provider, MD   methylphenidate (RITALIN) 10 MG tablet Take 10 mg by mouth 2 times daily. Historical Provider, MD       Social History     Tobacco Use    Smoking status: Current Some Day Smoker     Packs/day: 0.25     Years: 26.00     Pack years: 6.50     Types: Cigarettes    Smokeless tobacco: Never Used    Tobacco comment: 1 pack per week   Substance Use Topics    Alcohol use: Yes     Comment: rarely    Drug use: Not Currently            PHYSICAL EXAMINATION:  [ INSTRUCTIONS:  \"[x]\" Indicates a positive item  \"[]\" Indicates a negative item  -- DELETE ALL ITEMS NOT EXAMINED]  Vital Signs: (As obtained by patient/caregiver or practitioner observation)    Blood pressure-  Heart rate-    Respiratory rate-    Temperature-  Pulse oximetry-     Constitutional: [x] Appears well-developed and well-nourished [x] No apparent distress      [] Abnormal-   Mental status  [x] Alert and awake  [x] Oriented to person/place/time [x]Able to follow commands      Eyes:  EOM    []  Normal  [] Abnormal-  Sclera  [x]  Normal  [] Abnormal -         Discharge []  None visible  [] Abnormal -    HENT:   [x] Normocephalic, atraumatic. [] Abnormal   [] Mouth/Throat: Mucous membranes are moist.     External Ears [] Normal  [] Abnormal-     Neck: [x] No visualized mass     Pulmonary/Chest: [x] Respiratory effort normal.  [x] No visualized signs of difficulty breathing or respiratory distress        [] Abnormal-      Musculoskeletal:   [] Normal gait with no signs of ataxia         [x] Normal range of motion of neck        [] Abnormal-       Neurological:        [] No Facial Asymmetry (Cranial nerve 7 motor function) (limited exam to video visit)          [x] No gaze palsy        [] Abnormal-         Skin:        [x] No significant exanthematous lesions or discoloration noted on facial skin         [] Abnormal-            Psychiatric:       [x] Normal Affect [x] No Hallucinations        [] Abnormal-     Other pertinent observable physical exam findings-     ASSESSMENT/PLAN:  1.   Renal disease, elevated creatinine

## 2020-11-16 NOTE — TELEPHONE ENCOUNTER
Patient called pre-service center [facility]) Eric with red flag complaint. Brief description of triage: UTI    Triage indicates for patient to Be seen in office today    Care advice provided, patient verbalizes understanding; denies any other questions or concerns; instructed to call back for any new or worsening symptoms. Writer provided warm transfer to [place] [facility] for appointment scheduling. Attention Provider: Thank you for allowing me to participate in the care of your patient. The patient was connected to triage in response to information provided to the [facility]. Please do not respond through this encounter as the response is not directed to a shared pool. Reason for Disposition   All other males with painful urination, or patient wants to be seen    Answer Assessment - Initial Assessment Questions  1. SEVERITY: \"How bad is the pain? \"  (e.g., Scale 1-10; mild, moderate, or severe)    - MILD (1-3): complains slightly about urination hurting    - MODERATE (4-7): interferes with normal activities      - SEVERE (8-10): excruciating, unwilling or unable to urinate because of the pain       3/10 at rest 7/10 with eating    2. FREQUENCY: \"How many times have you had painful urination today?\"       2    3. PATTERN: \"Is pain present every time you urinate or just sometimes? \"       With most urination, usually eases up but then comes back    4. ONSET: \"When did the painful urination start? \"       Last week    5. FEVER: \"Do you have a fever? \" If so, ask: \"What is your temperature, how was it measured, and when did it start? \"      No    6. PAST UTI: \"Have you had a urine infection before? \" If so, ask: \"When was the last time? \" and \"What happened that time? \"       Yes, 2-3 times a year. Patient has PKD. 7. CAUSE: \"What do you think is causing the painful urination? \"       UTI    8. OTHER SYMPTOMS: \"Do you have any other symptoms? \" (e.g., flank pain, penile discharge, scrotal pain, blood in urine)      Bloody urine    Protocols used: URINATION PAIN - MALE-ADULT-[place]

## 2020-11-16 NOTE — PATIENT INSTRUCTIONS
Cefdinir and Medrol Dosepak  Increase fluids  Watch salt intake it should be around 2 g a day   omit NSAIDs   ACE inhibitor for renal protection and blood pressure  Keep a log of blood pressure readings and bring to next appointment    With Dr. Jane Márquez in office somewhere around December 18 at which time we will get a urine culture

## 2020-12-09 ENCOUNTER — VIRTUAL VISIT (OUTPATIENT)
Dept: FAMILY MEDICINE CLINIC | Age: 40
End: 2020-12-09
Payer: COMMERCIAL

## 2020-12-09 PROCEDURE — 99213 OFFICE O/P EST LOW 20 MIN: CPT | Performed by: FAMILY MEDICINE

## 2020-12-09 RX ORDER — CITALOPRAM 20 MG/1
20 TABLET ORAL DAILY
Qty: 30 TABLET | Refills: 3 | Status: ON HOLD | OUTPATIENT
Start: 2020-12-09 | End: 2021-01-28 | Stop reason: HOSPADM

## 2020-12-09 RX ORDER — SIMVASTATIN 10 MG
10 TABLET ORAL NIGHTLY
Qty: 30 TABLET | Refills: 3 | Status: SHIPPED | OUTPATIENT
Start: 2020-12-09 | End: 2021-01-27 | Stop reason: DRUGHIGH

## 2020-12-09 RX ORDER — CITALOPRAM 10 MG/1
10 TABLET ORAL DAILY
Qty: 30 TABLET | Refills: 3 | Status: SHIPPED | OUTPATIENT
Start: 2020-12-09 | End: 2021-03-23

## 2020-12-09 RX ORDER — ALLOPURINOL 100 MG/1
100 TABLET ORAL DAILY
Qty: 30 TABLET | Refills: 3 | Status: SHIPPED | OUTPATIENT
Start: 2020-12-09 | End: 2021-04-20

## 2020-12-09 RX ORDER — LISINOPRIL 40 MG/1
40 TABLET ORAL DAILY
Qty: 90 TABLET | Refills: 1 | Status: ON HOLD | OUTPATIENT
Start: 2020-12-09 | End: 2021-01-28 | Stop reason: HOSPADM

## 2020-12-09 RX ORDER — AMLODIPINE BESYLATE 10 MG/1
10 TABLET ORAL DAILY
Qty: 30 TABLET | Refills: 3 | Status: SHIPPED | OUTPATIENT
Start: 2020-12-09 | End: 2021-04-20

## 2020-12-09 RX ORDER — LISINOPRIL 10 MG/1
40 TABLET ORAL DAILY
Qty: 30 TABLET | Refills: 3 | Status: CANCELLED | OUTPATIENT
Start: 2020-12-09

## 2020-12-09 ASSESSMENT — PATIENT HEALTH QUESTIONNAIRE - PHQ9
SUM OF ALL RESPONSES TO PHQ QUESTIONS 1-9: 0
SUM OF ALL RESPONSES TO PHQ9 QUESTIONS 1 & 2: 0
SUM OF ALL RESPONSES TO PHQ QUESTIONS 1-9: 0
SUM OF ALL RESPONSES TO PHQ QUESTIONS 1-9: 0
1. LITTLE INTEREST OR PLEASURE IN DOING THINGS: 0
2. FEELING DOWN, DEPRESSED OR HOPELESS: 0

## 2020-12-09 NOTE — PROGRESS NOTES
methylphenidate (RITALIN) 10 MG tablet Take 10 mg by mouth 2 times daily. Historical Provider, MD       Social History     Tobacco Use    Smoking status: Current Some Day Smoker     Packs/day: 0.25     Years: 26.00     Pack years: 6.50     Types: Cigarettes    Smokeless tobacco: Never Used    Tobacco comment: 1 pack per week   Substance Use Topics    Alcohol use: Yes     Comment: rarely    Drug use: Not Currently        Allergies   Allergen Reactions    Bee Pollen     Hydrocodone Itching    Iodine Swelling    Nsaids      Does not take due to kidney function issues    Peanut-Containing Drug Products      [place] tree dust    ,   Past Medical History:   Diagnosis Date    ADD (attention deficit disorder with hyperactivity)     Hyperlipidemia     Hypertension     PKD (polycystic kidney disease)     Renal disease     polycytic   ,   Past Surgical History:   Procedure Laterality Date    HERNIA REPAIR      OTHER SURGICAL HISTORY Bilateral 5/15/13    BILATERAL LAPAROSCOPIC PREPERITONEAL INGUINAL HERNIA REPAIR    UMBILICAL HERNIA REPAIR N/A 4/97/4788    OPEN UMBILICAL HERNIA REPAIR performed by Aziza Benites MD at [address] [place] [address]  3/15/2011    VASECTOMY      WISDOM TOOTH EXTRACTION         PHYSICAL EXAMINATION:  [ INSTRUCTIONS:  \"[x]\" Indicates a positive item  \"[]\" Indicates a negative item  -- DELETE ALL ITEMS NOT EXAMINED]  Vital Signs: (As obtained by patient/caregiver or practitioner observation)    Height - 6' Weight - 210 lb-     Constitutional: [x] Appears well-developed and well-nourished [x] No apparent distress      [] Abnormal-   Mental status  [x] Alert and awake  [x] Oriented to person/place/time [x]Able to follow commands      Eyes:  EOM    [x]  Normal  [] Abnormal-  Sclera  []  Normal  [] Abnormal -         Discharge []  None visible  [] Abnormal -    HENT:   [x] Normocephalic, atraumatic.   [] Abnormal   [] Mouth/Throat: Mucous membranes are moist.     External Ears [x] Normal  [] Abnormal-     Neck: [x] No visualized mass     Pulmonary/Chest: [x] Respiratory effort normal.  [x] No visualized signs of difficulty breathing or respiratory distress        [] Abnormal-      Musculoskeletal:   [] Normal gait with no signs of ataxia         [x] Normal range of motion of neck        [] Abnormal-       Neurological:        [x] No Facial Asymmetry (Cranial nerve 7 motor function) (limited exam to video visit)          [x] No gaze palsy        [] Abnormal-         Skin:        [x] No significant exanthematous lesions or discoloration noted on facial skin         [] Abnormal-            Psychiatric:       [x] Normal Affect [] No Hallucinations        [] Abnormal-     Other pertinent observable physical exam findings-     ASSESSMENT/PLAN:  1. Depressed mood  - doing well  - citalopram (CELEXA) 10 MG tablet; Take 1 tablet by mouth daily  Dispense: 30 tablet; Refill: 3    2. Anxiety  -doing well  - citalopram (CELEXA) 10 MG tablet; Take 1 tablet by mouth daily  Dispense: 30 tablet; Refill: 3    3. Gastroesophageal reflux disease without esophagitis  - dexlansoprazole (DEXILANT) 30 MG CPDR delayed release capsule; Take 30 mg by mouth daily  Dispense: 30 capsule; Refill: 3    Advised pt to take as little Ibuprofen as possible and to augment with Tylenol. Pt will call office to make appointment for a Physical Exam for when he is back in town. Fiona Swenson is a 45 y.o. male being evaluated by a Virtual Visit (video visit) encounter to address concerns as mentioned above. A caregiver was present when appropriate. Due to this being a TeleHealth encounter (During [facility]- public health emergency), evaluation of the following organ systems was limited: Vitals/Constitutional/EENT/Resp/CV/GI//MS/Neuro/Skin/Heme-Lymph-Imm.   Pursuant to the emergency declaration under the [facility]5 waiver authority and the Coronavirus

## 2021-01-25 ENCOUNTER — HOSPITAL ENCOUNTER (OUTPATIENT)
Age: 41
Discharge: HOME | DRG: 683 | End: 2021-01-25
Payer: COMMERCIAL

## 2021-01-25 LAB
ALBUMIN SERPL-MCNC: 4.6 G/DL (ref 3.4–5)
ANION GAP SERPL CALCULATED.3IONS-SCNC: 11 MMOL/L (ref 3–16)
BILIRUBIN URINE: NEGATIVE
BLOOD, URINE: ABNORMAL
BUN BLDV-MCNC: 52 MG/DL (ref 7–20)
CALCIUM SERPL-MCNC: 9.9 MG/DL (ref 8.3–10.6)
CHLORIDE BLD-SCNC: 105 MMOL/L (ref 99–110)
CLARITY: CLEAR
CO2: 20 MMOL/L (ref 21–32)
COLOR: YELLOW
CREAT SERPL-MCNC: 3.7 MG/DL (ref 0.9–1.3)
CREATININE URINE: 67.4 MG/DL (ref 39–259)
EPITHELIAL CELLS, UA: ABNORMAL /HPF (ref 0–5)
GFR AFRICAN AMERICAN: 22
GFR NON-AFRICAN AMERICAN: 18
GLUCOSE BLD-MCNC: 106 MG/DL (ref 70–99)
GLUCOSE URINE: NEGATIVE MG/DL
HCT VFR BLD CALC: 39.4 % (ref 40.5–52.5)
HEMOGLOBIN: 13.1 G/DL (ref 13.5–17.5)
KETONES, URINE: NEGATIVE MG/DL
LEUKOCYTE ESTERASE, URINE: ABNORMAL
MCH RBC QN AUTO: 28.6 PG (ref 26–34)
MCHC RBC AUTO-ENTMCNC: 33.3 G/DL (ref 31–36)
MCV RBC AUTO: 86 FL (ref 80–100)
MICROSCOPIC EXAMINATION: YES
NITRITE, URINE: NEGATIVE
PARATHYROID HORMONE INTACT: 95.7 PG/ML (ref 14–72)
PDW BLD-RTO: 14 % (ref 12.4–15.4)
PH UA: 6.5 (ref 5–8)
PHOSPHORUS: 3.9 MG/DL (ref 2.5–4.9)
PLATELET # BLD: 305 K/UL (ref 135–450)
PMV BLD AUTO: 9 FL (ref 5–10.5)
POTASSIUM SERPL-SCNC: 5.7 MMOL/L (ref 3.5–5.1)
PROTEIN PROTEIN: 86 MG/DL
PROTEIN UA: 100 MG/DL
PROTEIN/CREAT RATIO: 1.3 MG/DL
RBC # BLD: 4.58 M/UL (ref 4.2–5.9)
RBC UA: ABNORMAL /HPF (ref 0–4)
SODIUM BLD-SCNC: 136 MMOL/L (ref 136–145)
SPECIFIC GRAVITY UA: 1.01 (ref 1–1.03)
URIC ACID, SERUM: 5.6 MG/DL (ref 3.5–7.2)
URINE TYPE: ABNORMAL
UROBILINOGEN, URINE: 0.2 E.U./DL
VITAMIN D 25-HYDROXY: 22.3 NG/ML
WBC # BLD: 5.7 K/UL (ref 4–11)
WBC UA: ABNORMAL /HPF (ref 0–5)

## 2021-01-25 PROCEDURE — 36415 COLL VENOUS BLD VENIPUNCTURE: CPT

## 2021-01-25 PROCEDURE — 80069 RENAL FUNCTION PANEL: CPT

## 2021-01-25 PROCEDURE — 83970 ASSAY OF PARATHORMONE: CPT

## 2021-01-25 PROCEDURE — 84550 ASSAY OF BLOOD/URIC ACID: CPT

## 2021-01-25 PROCEDURE — 82570 ASSAY OF URINE CREATININE: CPT

## 2021-01-25 PROCEDURE — 82306 VITAMIN D 25 HYDROXY: CPT

## 2021-01-25 PROCEDURE — 81001 URINALYSIS AUTO W/SCOPE: CPT

## 2021-01-25 PROCEDURE — 84156 ASSAY OF PROTEIN URINE: CPT

## 2021-01-25 PROCEDURE — 85027 COMPLETE CBC AUTOMATED: CPT

## 2021-01-26 ENCOUNTER — OFFICE VISIT (OUTPATIENT)
Dept: FAMILY MEDICINE CLINIC | Age: 41
End: 2021-01-26
Payer: COMMERCIAL

## 2021-01-26 VITALS
HEART RATE: 94 BPM | RESPIRATION RATE: 16 BRPM | TEMPERATURE: 99 F | BODY MASS INDEX: 31.64 KG/M2 | HEIGHT: 70 IN | SYSTOLIC BLOOD PRESSURE: 136 MMHG | WEIGHT: 221 LBS | DIASTOLIC BLOOD PRESSURE: 78 MMHG | OXYGEN SATURATION: 98 %

## 2021-01-26 DIAGNOSIS — Z00.00 ANNUAL PHYSICAL EXAM: Primary | ICD-10-CM

## 2021-01-26 DIAGNOSIS — Z23 NEED FOR PROPHYLACTIC VACCINATION AGAINST DIPHTHERIA-TETANUS-PERTUSSIS (DTP): ICD-10-CM

## 2021-01-26 DIAGNOSIS — I10 HYPERTENSION, UNSPECIFIED TYPE: ICD-10-CM

## 2021-01-26 DIAGNOSIS — M10.9 ACUTE GOUT OF MULTIPLE SITES, UNSPECIFIED CAUSE: ICD-10-CM

## 2021-01-26 DIAGNOSIS — Z23 NEEDS FLU SHOT: ICD-10-CM

## 2021-01-26 LAB
A/G RATIO: 1.5 (ref 1.1–2.2)
ALBUMIN SERPL-MCNC: 4.8 G/DL (ref 3.4–5)
ALP BLD-CCNC: 83 U/L (ref 40–129)
ALT SERPL-CCNC: 21 U/L (ref 10–40)
ANION GAP SERPL CALCULATED.3IONS-SCNC: 15 MMOL/L (ref 3–16)
AST SERPL-CCNC: 17 U/L (ref 15–37)
BILIRUB SERPL-MCNC: <0.2 MG/DL (ref 0–1)
BUN BLDV-MCNC: 59 MG/DL (ref 7–20)
CALCIUM SERPL-MCNC: 10.1 MG/DL (ref 8.3–10.6)
CHLORIDE BLD-SCNC: 104 MMOL/L (ref 99–110)
CHOLESTEROL, TOTAL: 302 MG/DL (ref 0–199)
CO2: 20 MMOL/L (ref 21–32)
CREAT SERPL-MCNC: 4.1 MG/DL (ref 0.9–1.3)
GFR AFRICAN AMERICAN: 20
GFR NON-AFRICAN AMERICAN: 16
GLOBULIN: 3.3 G/DL
GLUCOSE BLD-MCNC: 74 MG/DL (ref 70–99)
HDLC SERPL-MCNC: 35 MG/DL (ref 40–60)
LDL CHOLESTEROL CALCULATED: ABNORMAL MG/DL
LDL CHOLESTEROL DIRECT: 199 MG/DL
POTASSIUM SERPL-SCNC: 6.5 MMOL/L (ref 3.5–5.1)
SODIUM BLD-SCNC: 139 MMOL/L (ref 136–145)
TOTAL PROTEIN: 8.1 G/DL (ref 6.4–8.2)
TRIGL SERPL-MCNC: 536 MG/DL (ref 0–150)
TSH SERPL DL<=0.05 MIU/L-ACNC: 0.76 UIU/ML (ref 0.27–4.2)
VLDLC SERPL CALC-MCNC: ABNORMAL MG/DL

## 2021-01-26 PROCEDURE — 99395 PREV VISIT EST AGE 18-39: CPT | Performed by: FAMILY MEDICINE

## 2021-01-26 PROCEDURE — 90471 IMMUNIZATION ADMIN: CPT | Performed by: FAMILY MEDICINE

## 2021-01-26 PROCEDURE — 90688 IIV4 VACCINE SPLT 0.5 ML IM: CPT | Performed by: FAMILY MEDICINE

## 2021-01-26 PROCEDURE — 90715 TDAP VACCINE 7 YRS/> IM: CPT | Performed by: FAMILY MEDICINE

## 2021-01-26 PROCEDURE — 90472 IMMUNIZATION ADMIN EACH ADD: CPT | Performed by: FAMILY MEDICINE

## 2021-01-26 RX ORDER — METHYLPREDNISOLONE 4 MG/1
TABLET ORAL
Qty: 1 KIT | Refills: 0 | Status: ON HOLD | OUTPATIENT
Start: 2021-01-26 | End: 2021-01-28 | Stop reason: HOSPADM

## 2021-01-26 ASSESSMENT — PATIENT HEALTH QUESTIONNAIRE - PHQ9
1. LITTLE INTEREST OR PLEASURE IN DOING THINGS: 0
SUM OF ALL RESPONSES TO PHQ QUESTIONS 1-9: 0
SUM OF ALL RESPONSES TO PHQ QUESTIONS 1-9: 0
2. FEELING DOWN, DEPRESSED OR HOPELESS: 0

## 2021-01-26 ASSESSMENT — ENCOUNTER SYMPTOMS
SHORTNESS OF BREATH: 0
ABDOMINAL PAIN: 0

## 2021-01-26 NOTE — PROGRESS NOTES
Haris Quiroz  YOB: 1980    Date of Service:  1/26/2021    Chief Complaint:   Haris Quiroz is a 45 y.o. male who presents for complete physical examination. 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)  Seatbelt use: yes  Are You a Spiritual person: yes  Living will: no    Wt Readings from Last 3 Encounters:   01/26/21 221 lb (100.2 kg)   08/20/20 210 lb (95.3 kg)   04/29/19 210 lb (95.3 kg)     BP Readings from Last 3 Encounters:   01/26/21 136/78   08/21/20 134/74   04/29/19 (!) 140/92       Patient Active Problem List   Diagnosis   (none) - all problems resolved or deleted       Health Maintenance   Topic Date Due    Hepatitis C screen  1980    Varicella vaccine (1 of 2 - 2-dose childhood series) 07/27/1982    Pneumococcal 0-64 years Vaccine (1 of 1 - PPSV23) 07/27/1987    HIV screen  07/27/1996    DTaP/Tdap/Td vaccine (1 - Tdap) 07/27/2000    Lipid screen  09/21/2012    Flu vaccine (1) 09/01/2020    Potassium monitoring  01/25/2022    Creatinine monitoring  01/25/2022    Hepatitis A vaccine  Aged Out    Hepatitis B vaccine  Aged Out    Hib vaccine  Aged Out    Meningococcal (ACWY) vaccine  Aged Out       There is no immunization history for the selected administration types on file for this patient. Allergies   Allergen Reactions    Bee Pollen     Hydrocodone Itching    Iodine Swelling    Nsaids      Does not take due to kidney function issues    Peanut-Containing Drug Products      [address] tree dust      Outpatient Medications Marked as Taking for the 1/26/21 encounter (Office Visit) with Vicky Ing, DO   Medication Sig Dispense Refill    methylPREDNISolone (MEDROL DOSEPACK) 4 MG tablet Take by mouth.  1 kit 0    citalopram (CELEXA) 10 MG tablet Take 1 tablet by mouth daily 30 tablet 3    allopurinol (ZYLOPRIM) 100 MG tablet Take 1 tablet by mouth daily 30 tablet 3    amLODIPine (NORVASC) 10 MG tablet Take 1 tablet by mouth daily 30 tablet 3    simvastatin (ZOCOR) 10 MG tablet Take 1 tablet by mouth nightly 30 tablet 3    dexlansoprazole (DEXILANT) 30 MG CPDR delayed release capsule Take 30 mg by mouth daily 30 capsule 3    lisinopril (PRINIVIL;ZESTRIL) 40 MG tablet Take 1 tablet by mouth daily 90 tablet 1    citalopram (CELEXA) 20 MG tablet Take 1 tablet by mouth daily 30 tablet 3       Past Medical History:   Diagnosis Date    ADD (attention deficit disorder with hyperactivity)     Hyperlipidemia     Hypertension     PKD (polycystic kidney disease)     Renal disease     polycytic     Past Surgical History:   Procedure Laterality Date    HERNIA REPAIR      OTHER SURGICAL HISTORY Bilateral 5/15/13    BILATERAL LAPAROSCOPIC PREPERITONEAL INGUINAL HERNIA REPAIR    UMBILICAL HERNIA REPAIR N/A 6/41/2809    OPEN UMBILICAL HERNIA REPAIR performed by Osiel Hall MD at [address] [place] [address]  3/15/2011    VASECTOMY      WISDOM TOOTH EXTRACTION       Family History   Problem Relation Age of Onset    Kidney Disease Mother     Heart Disease Mother         CHF    High Blood Pressure Sister     High Cholesterol Sister     Other Sister         PKD    High Blood Pressure Brother     High Cholesterol Brother     Other Brother         PKD     Social History     Socioeconomic History    Marital status:      Spouse name: Not on file    Number of children: Not on file    Years of education: Not on file    Highest education level: Not on file   Occupational History    Not on file   Social Needs    Financial resource strain: Not on file    Food insecurity     Worry: Not on file     Inability: Not on file    Transportation needs     Medical: Not on file     Non-medical: Not on file   Tobacco Use    Smoking status: Current Some Day Smoker     Packs/day: 0.25     Years: 26.00     Pack years: 6.50     Types: Cigarettes    Smokeless tobacco: Never Used    Tobacco comment: 1 pack per month   Substance and Sexual Activity    Alcohol use: Yes     Comment: rarely    Drug use: Not Currently    Sexual activity: Yes     Partners: Female   Lifestyle    Physical activity     Days per week: Not on file     Minutes per session: Not on file    Stress: Not on file   Relationships    Social connections     Talks on phone: Not on file     Gets together: Not on file     Attends Hinduism service: Not on file     Active member of club or organization: Not on file     Attends meetings of clubs or organizations: Not on file     Relationship status: Not on file    Intimate partner violence     Fear of current or ex partner: Not on file     Emotionally abused: Not on file     Physically abused: Not on file     Forced sexual activity: Not on file   Other Topics Concern    Not on file   Social History Narrative    Not on file       Review ofSystems:  Review of Systems   Constitutional: Negative for fatigue. HENT: Negative for postnasal drip. Eyes: Positive for visual disturbance (floaters). Respiratory: Negative for shortness of breath. Cardiovascular: Negative for chest pain. Gastrointestinal: Negative for abdominal pain. Endocrine: Positive for heat intolerance. Negative for cold intolerance. Genitourinary: Negative for difficulty urinating. Musculoskeletal:        Gout flare for 3 weeks in feet and right LE   Skin: Negative for rash. Allergic/Immunologic: Negative for environmental allergies. Neurological: Negative for dizziness and light-headedness. Hematological: Does not bruise/bleed easily. Psychiatric/Behavioral: Negative for dysphoric mood. The patient is not nervous/anxious.         PhysicalExam:   Vitals:    01/26/21 1119   BP: 136/78   Site: Left Upper Arm   Position: Sitting   Pulse: 94   Resp: 16   Temp: 99 °F (37.2 °C)   TempSrc: Temporal   SpO2: 98%   Weight: 221 lb (100.2 kg) Height: 5' 10\" (1.778 m)     Body mass index is 31.71 kg/m². Physical Exam  Vitals signs reviewed. Constitutional:       Appearance: He is well-developed. HENT:      Head: Normocephalic and atraumatic. Right Ear: Tympanic membrane and external ear normal.      Left Ear: Tympanic membrane and external ear normal.      Nose: Nose normal.      Mouth/Throat:      Mouth: Mucous membranes are moist.   Eyes:      Conjunctiva/sclera: Conjunctivae normal.      Pupils: Pupils are equal, round, and reactive to light. Neck:      Musculoskeletal: Normal range of motion. No neck rigidity or muscular tenderness. Cardiovascular:      Rate and Rhythm: Normal rate and regular rhythm. Heart sounds: Normal heart sounds. No murmur. Pulmonary:      Effort: Pulmonary effort is normal.      Breath sounds: Normal breath sounds. No wheezing. Abdominal:      General: Bowel sounds are normal.      Palpations: Abdomen is soft. Tenderness: There is no abdominal tenderness. Musculoskeletal: Normal range of motion. Comments: Bilateral UE normal ROM   Skin:     General: Skin is warm and dry. Neurological:      Mental Status: He is alert and oriented to person, place, and time. Cranial Nerves: No cranial nerve deficit. Sensory: No sensory deficit. Motor: No weakness. Coordination: Coordination normal.      Gait: Gait abnormal.      Deep Tendon Reflexes: Reflexes are normal and symmetric. Psychiatric:         Behavior: Behavior normal.         Thought Content: Thought content normal.         Judgment: Judgment normal.         Assessment/Plan:   Diagnosis Orders   1. Annual physical exam     2. Needs flu shot  INFLUENZA, QUADV, 3 YRS AND OLDER, IM, MDV, 0.5ML ([place] Sorto)   3. Need for prophylactic vaccination against diphtheria-tetanus-pertussis (DTP)  Tdap (age 10y-63y) IM (Adacel)   4.  Acute gout of multiple sites, unspecified cause  methylPREDNISolone (MEDROL DOSEPACK) 4 MG tablet Instructed patient to let Dr. Ronel Breaux know this afternoon when he sees him that he has been started on the Medrol Dosepak. Also asked patient to discuss the COVID-19 vaccination with Dr. Ronel Breaux given his decreased GFR. Return in about 6 months (around 7/26/2021) for Medication F/U. Prior to Visit Medications    Medication Sig Taking? Authorizing Provider   methylPREDNISolone (MEDROL DOSEPACK) 4 MG tablet Take by mouth. Yes Kelly Eldridge, DO   citalopram (CELEXA) 10 MG tablet Take 1 tablet by mouth daily Yes Kelly Eldridge DO   allopurinol (ZYLOPRIM) 100 MG tablet Take 1 tablet by mouth daily Yes Kelly Eldridge DO   amLODIPine (NORVASC) 10 MG tablet Take 1 tablet by mouth daily Yes Kelly Eldridge DO   simvastatin (ZOCOR) 10 MG tablet Take 1 tablet by mouth nightly Yes Kelly Eldridge DO   dexlansoprazole (DEXILANT) 30 MG CPDR delayed release capsule Take 30 mg by mouth daily Yes Kelly Eldridge DO   lisinopril (PRINIVIL;ZESTRIL) 40 MG tablet Take 1 tablet by mouth daily Yes Kelly Eldridge DO   citalopram (CELEXA) 20 MG tablet Take 1 tablet by mouth daily Yes Kelly Eldridge DO   methylphenidate (RITALIN) 10 MG tablet Take 10 mg by mouth 2 times daily.   Historical Provider, MD

## 2021-01-26 NOTE — PROGRESS NOTES
Vaccine Information Sheet, \"Influenza - Inactivated\"  given to Nay Singh, or parent/legal guardian of  Nay Singh and verbalized understanding. Patient responses:    Have you ever had a reaction to a flu vaccine? No  Do you have any current illness? No  Have you ever had Guillian Melber Syndrome? No  Do you have a serious allergy to any of the follow: Neomycin, Polymyxin, Thimerosal, eggs or egg products? No    Flu vaccine given per order. Please see immunization tab. Risks and benefits explained. Current VIS given.       Immunizations Administered     Name Date Dose Route    Influenza, Quadv, IM, (6 mo and older Fluzone, Flulaval, Fluarix and 3 yrs and older Afluria) 1/26/2021 0.5 mL Intramuscular    Site: Deltoid- Right    Lot: [id number]    NDC: 86979-999-89    Tdap (Boostrix, Adacel) 1/26/2021 0.5 mL Intramuscular    Site: Deltoid- Left    Lot: [id number]    ND: 29820-334-78

## 2021-01-27 ENCOUNTER — HOSPITAL ENCOUNTER (INPATIENT)
Age: 41
LOS: 1 days | Discharge: HOME | DRG: 683 | End: 2021-01-28
Attending: EMERGENCY MEDICINE | Admitting: INTERNAL MEDICINE
Payer: COMMERCIAL

## 2021-01-27 ENCOUNTER — TELEPHONE (OUTPATIENT)
Dept: FAMILY MEDICINE CLINIC | Age: 41
End: 2021-01-27

## 2021-01-27 DIAGNOSIS — N18.9 CHRONIC KIDNEY DISEASE, UNSPECIFIED CKD STAGE: ICD-10-CM

## 2021-01-27 DIAGNOSIS — E87.5 HYPERKALEMIA: Primary | ICD-10-CM

## 2021-01-27 LAB
A/G RATIO: 1.5 (ref 1.1–2.2)
ALBUMIN SERPL-MCNC: 5.1 G/DL (ref 3.4–5)
ALP BLD-CCNC: 82 U/L (ref 40–129)
ALT SERPL-CCNC: 20 U/L (ref 10–40)
ANION GAP SERPL CALCULATED.3IONS-SCNC: 12 MMOL/L (ref 3–16)
AST SERPL-CCNC: 16 U/L (ref 15–37)
BASOPHILS ABSOLUTE: 0 K/UL (ref 0–0.2)
BASOPHILS RELATIVE PERCENT: 0.2 %
BILIRUB SERPL-MCNC: <0.2 MG/DL (ref 0–1)
BUN BLDV-MCNC: 63 MG/DL (ref 7–20)
CALCIUM SERPL-MCNC: 10 MG/DL (ref 8.3–10.6)
CHLORIDE BLD-SCNC: 109 MMOL/L (ref 99–110)
CO2: 17 MMOL/L (ref 21–32)
CREAT SERPL-MCNC: 4 MG/DL (ref 0.9–1.3)
EOSINOPHILS ABSOLUTE: 0 K/UL (ref 0–0.6)
EOSINOPHILS RELATIVE PERCENT: 0 %
GFR AFRICAN AMERICAN: 20
GFR NON-AFRICAN AMERICAN: 17
GLOBULIN: 3.4 G/DL
GLUCOSE BLD-MCNC: 115 MG/DL (ref 70–99)
HCT VFR BLD CALC: 38.2 % (ref 40.5–52.5)
HEMOGLOBIN: 12.6 G/DL (ref 13.5–17.5)
LYMPHOCYTES ABSOLUTE: 0.6 K/UL (ref 1–5.1)
LYMPHOCYTES RELATIVE PERCENT: 5.4 %
MAGNESIUM: 2.1 MG/DL (ref 1.8–2.4)
MCH RBC QN AUTO: 28.1 PG (ref 26–34)
MCHC RBC AUTO-ENTMCNC: 33 G/DL (ref 31–36)
MCV RBC AUTO: 85.1 FL (ref 80–100)
MONOCYTES ABSOLUTE: 0.5 K/UL (ref 0–1.3)
MONOCYTES RELATIVE PERCENT: 4.4 %
NEUTROPHILS ABSOLUTE: 10.8 K/UL (ref 1.7–7.7)
NEUTROPHILS RELATIVE PERCENT: 90 %
PDW BLD-RTO: 13.7 % (ref 12.4–15.4)
PLATELET # BLD: 337 K/UL (ref 135–450)
PMV BLD AUTO: 8.2 FL (ref 5–10.5)
POTASSIUM REFLEX MAGNESIUM: 6.3 MMOL/L (ref 3.5–5.1)
RBC # BLD: 4.49 M/UL (ref 4.2–5.9)
SODIUM BLD-SCNC: 138 MMOL/L (ref 136–145)
TOTAL PROTEIN: 8.5 G/DL (ref 6.4–8.2)
TROPONIN: <0.01 NG/ML
WBC # BLD: 12 K/UL (ref 4–11)

## 2021-01-27 PROCEDURE — 2500000003 HC RX 250 WO HCPCS: Performed by: NURSE PRACTITIONER

## 2021-01-27 PROCEDURE — 2580000003 HC RX 258: Performed by: NURSE PRACTITIONER

## 2021-01-27 PROCEDURE — 84484 ASSAY OF TROPONIN QUANT: CPT

## 2021-01-27 PROCEDURE — 93005 ELECTROCARDIOGRAM TRACING: CPT | Performed by: EMERGENCY MEDICINE

## 2021-01-27 PROCEDURE — 6370000000 HC RX 637 (ALT 250 FOR IP): Performed by: NURSE PRACTITIONER

## 2021-01-27 PROCEDURE — 99284 EMERGENCY DEPT VISIT MOD MDM: CPT

## 2021-01-27 PROCEDURE — 96375 TX/PRO/DX INJ NEW DRUG ADDON: CPT

## 2021-01-27 PROCEDURE — 84132 ASSAY OF SERUM POTASSIUM: CPT

## 2021-01-27 PROCEDURE — 80053 COMPREHEN METABOLIC PANEL: CPT

## 2021-01-27 PROCEDURE — 1200000000 HC SEMI PRIVATE

## 2021-01-27 PROCEDURE — 2500000003 HC RX 250 WO HCPCS: Performed by: INTERNAL MEDICINE

## 2021-01-27 PROCEDURE — 85025 COMPLETE CBC W/AUTO DIFF WBC: CPT

## 2021-01-27 PROCEDURE — 83735 ASSAY OF MAGNESIUM: CPT

## 2021-01-27 PROCEDURE — 6360000002 HC RX W HCPCS: Performed by: NURSE PRACTITIONER

## 2021-01-27 PROCEDURE — 96374 THER/PROPH/DIAG INJ IV PUSH: CPT

## 2021-01-27 RX ORDER — SODIUM CHLORIDE 0.9 % (FLUSH) 0.9 %
10 SYRINGE (ML) INJECTION EVERY 12 HOURS SCHEDULED
Status: DISCONTINUED | OUTPATIENT
Start: 2021-01-27 | End: 2021-01-28 | Stop reason: HOSPADM

## 2021-01-27 RX ORDER — PANTOPRAZOLE SODIUM 40 MG/1
40 TABLET, DELAYED RELEASE ORAL
Status: DISCONTINUED | OUTPATIENT
Start: 2021-01-28 | End: 2021-01-28 | Stop reason: HOSPADM

## 2021-01-27 RX ORDER — CALCIUM GLUCONATE 20 MG/ML
1000 INJECTION, SOLUTION INTRAVENOUS ONCE
Status: COMPLETED | OUTPATIENT
Start: 2021-01-27 | End: 2021-01-28

## 2021-01-27 RX ORDER — ATORVASTATIN CALCIUM 10 MG/1
10 TABLET, FILM COATED ORAL NIGHTLY
Status: DISCONTINUED | OUTPATIENT
Start: 2021-01-27 | End: 2021-01-28 | Stop reason: HOSPADM

## 2021-01-27 RX ORDER — ALLOPURINOL 100 MG/1
100 TABLET ORAL DAILY
Status: DISCONTINUED | OUTPATIENT
Start: 2021-01-28 | End: 2021-01-28 | Stop reason: HOSPADM

## 2021-01-27 RX ORDER — ACETAMINOPHEN 325 MG/1
650 TABLET ORAL EVERY 6 HOURS PRN
Status: DISCONTINUED | OUTPATIENT
Start: 2021-01-27 | End: 2021-01-28 | Stop reason: HOSPADM

## 2021-01-27 RX ORDER — DEXTROSE MONOHYDRATE 25 G/50ML
25 INJECTION, SOLUTION INTRAVENOUS ONCE
Status: COMPLETED | OUTPATIENT
Start: 2021-01-27 | End: 2021-01-27

## 2021-01-27 RX ORDER — SODIUM CHLORIDE 450 MG/100ML
INJECTION, SOLUTION INTRAVENOUS CONTINUOUS
Status: DISCONTINUED | OUTPATIENT
Start: 2021-01-27 | End: 2021-01-27

## 2021-01-27 RX ORDER — CITALOPRAM 20 MG/1
10 TABLET ORAL DAILY
Status: DISCONTINUED | OUTPATIENT
Start: 2021-01-28 | End: 2021-01-28 | Stop reason: HOSPADM

## 2021-01-27 RX ORDER — AMLODIPINE BESYLATE 5 MG/1
10 TABLET ORAL DAILY
Status: DISCONTINUED | OUTPATIENT
Start: 2021-01-28 | End: 2021-01-28 | Stop reason: HOSPADM

## 2021-01-27 RX ORDER — LISINOPRIL 20 MG/1
40 TABLET ORAL DAILY
Status: DISCONTINUED | OUTPATIENT
Start: 2021-01-28 | End: 2021-01-28

## 2021-01-27 RX ORDER — SODIUM CHLORIDE 0.9 % (FLUSH) 0.9 %
10 SYRINGE (ML) INJECTION PRN
Status: DISCONTINUED | OUTPATIENT
Start: 2021-01-27 | End: 2021-01-28 | Stop reason: HOSPADM

## 2021-01-27 RX ORDER — SIMVASTATIN 20 MG
20 TABLET ORAL EVERY EVENING
Qty: 30 TABLET | Refills: 3 | Status: SHIPPED | OUTPATIENT
Start: 2021-01-27 | End: 2021-04-22

## 2021-01-27 RX ORDER — HEPARIN SODIUM 5000 [USP'U]/ML
5000 INJECTION, SOLUTION INTRAVENOUS; SUBCUTANEOUS EVERY 8 HOURS SCHEDULED
Status: DISCONTINUED | OUTPATIENT
Start: 2021-01-27 | End: 2021-01-28 | Stop reason: HOSPADM

## 2021-01-27 RX ORDER — ACETAMINOPHEN 650 MG/1
650 SUPPOSITORY RECTAL EVERY 6 HOURS PRN
Status: DISCONTINUED | OUTPATIENT
Start: 2021-01-27 | End: 2021-01-28 | Stop reason: HOSPADM

## 2021-01-27 RX ORDER — CALCIUM GLUCONATE 94 MG/ML
1000 INJECTION, SOLUTION INTRAVENOUS ONCE
Status: DISCONTINUED | OUTPATIENT
Start: 2021-01-27 | End: 2021-01-27

## 2021-01-27 RX ORDER — ONDANSETRON 2 MG/ML
4 INJECTION INTRAMUSCULAR; INTRAVENOUS EVERY 6 HOURS PRN
Status: DISCONTINUED | OUTPATIENT
Start: 2021-01-27 | End: 2021-01-28 | Stop reason: HOSPADM

## 2021-01-27 RX ADMIN — CALCIUM GLUCONATE 1000 MG: 20 INJECTION, SOLUTION INTRAVENOUS at 23:09

## 2021-01-27 RX ADMIN — HEPARIN SODIUM 5000 UNITS: 5000 INJECTION INTRAVENOUS; SUBCUTANEOUS at 23:12

## 2021-01-27 RX ADMIN — DEXTROSE MONOHYDRATE 25 G: 25 INJECTION, SOLUTION INTRAVENOUS at 21:23

## 2021-01-27 RX ADMIN — ATORVASTATIN CALCIUM 10 MG: 10 TABLET, FILM COATED ORAL at 23:09

## 2021-01-27 RX ADMIN — INSULIN HUMAN 5 UNITS: 100 INJECTION, SOLUTION PARENTERAL at 21:23

## 2021-01-27 RX ADMIN — SODIUM BICARBONATE 25 MEQ: 84 INJECTION, SOLUTION INTRAVENOUS at 21:23

## 2021-01-27 RX ADMIN — SODIUM ZIRCONIUM CYCLOSILICATE 10 G: 10 POWDER, FOR SUSPENSION ORAL at 23:23

## 2021-01-27 RX ADMIN — SODIUM CHLORIDE 1000 ML: 4.5 INJECTION, SOLUTION INTRAVENOUS at 21:24

## 2021-01-27 RX ADMIN — Medication 10 ML: at 23:09

## 2021-01-27 ASSESSMENT — PAIN SCALES - GENERAL: PAINLEVEL_OUTOF10: 8

## 2021-01-27 ASSESSMENT — PAIN DESCRIPTION - ORIENTATION: ORIENTATION: RIGHT;LEFT

## 2021-01-27 ASSESSMENT — PAIN DESCRIPTION - LOCATION: LOCATION: FOOT

## 2021-01-27 NOTE — TELEPHONE ENCOUNTER
Spoke with pt's nephrologist Dr. Jeannette Morton regarding yesterdays critical lab of K+ - 6.5. He recommended that pt go to the ED for lowering of the potassium and discontinuation of Lisinopril. Pt had visit yesterday with Dr. Jeannette Morton and a referral was made for a  kidney transplant. Called pt and instructed him to go to the ED tonight, to discontinue the Lisinopril, and to contact the referral for the kidney transplant tomorrow. Will also increase Zocor to 20 mg. [facility] will follow[facility]up for a VV in 3 weeks.

## 2021-01-28 VITALS
TEMPERATURE: 97.8 F | WEIGHT: 220.9 LBS | RESPIRATION RATE: 18 BRPM | OXYGEN SATURATION: 97 % | BODY MASS INDEX: 29.92 KG/M2 | HEART RATE: 89 BPM | DIASTOLIC BLOOD PRESSURE: 76 MMHG | SYSTOLIC BLOOD PRESSURE: 114 MMHG | HEIGHT: 72 IN

## 2021-01-28 LAB
A/G RATIO: 1.6 (ref 1.1–2.2)
ALBUMIN SERPL-MCNC: 4.7 G/DL (ref 3.4–5)
ALP BLD-CCNC: 68 U/L (ref 40–129)
ALT SERPL-CCNC: 17 U/L (ref 10–40)
ANION GAP SERPL CALCULATED.3IONS-SCNC: 11 MMOL/L (ref 3–16)
AST SERPL-CCNC: 13 U/L (ref 15–37)
BASOPHILS ABSOLUTE: 0 K/UL (ref 0–0.2)
BASOPHILS RELATIVE PERCENT: 0.3 %
BILIRUB SERPL-MCNC: 0.3 MG/DL (ref 0–1)
BUN BLDV-MCNC: 63 MG/DL (ref 7–20)
CALCIUM SERPL-MCNC: 9.3 MG/DL (ref 8.3–10.6)
CHLORIDE BLD-SCNC: 107 MMOL/L (ref 99–110)
CO2: 21 MMOL/L (ref 21–32)
CREAT SERPL-MCNC: 3.8 MG/DL (ref 0.9–1.3)
EKG ATRIAL RATE: 86 BPM
EKG DIAGNOSIS: NORMAL
EKG P AXIS: 47 DEGREES
EKG P-R INTERVAL: 152 MS
EKG Q-T INTERVAL: 334 MS
EKG QRS DURATION: 74 MS
EKG QTC CALCULATION (BAZETT): 399 MS
EKG R AXIS: 27 DEGREES
EKG T AXIS: 36 DEGREES
EKG VENTRICULAR RATE: 86 BPM
EOSINOPHILS ABSOLUTE: 0 K/UL (ref 0–0.6)
EOSINOPHILS RELATIVE PERCENT: 0.1 %
GFR AFRICAN AMERICAN: 22
GFR NON-AFRICAN AMERICAN: 18
GLOBULIN: 2.9 G/DL
GLUCOSE BLD-MCNC: 87 MG/DL (ref 70–99)
HCT VFR BLD CALC: 37 % (ref 40.5–52.5)
HEMOGLOBIN: 11.9 G/DL (ref 13.5–17.5)
LYMPHOCYTES ABSOLUTE: 1.7 K/UL (ref 1–5.1)
LYMPHOCYTES RELATIVE PERCENT: 16.9 %
MCH RBC QN AUTO: 27.6 PG (ref 26–34)
MCHC RBC AUTO-ENTMCNC: 32.3 G/DL (ref 31–36)
MCV RBC AUTO: 85.3 FL (ref 80–100)
MONOCYTES ABSOLUTE: 0.8 K/UL (ref 0–1.3)
MONOCYTES RELATIVE PERCENT: 7.7 %
NEUTROPHILS ABSOLUTE: 7.7 K/UL (ref 1.7–7.7)
NEUTROPHILS RELATIVE PERCENT: 75 %
PDW BLD-RTO: 14.2 % (ref 12.4–15.4)
PLATELET # BLD: 323 K/UL (ref 135–450)
PMV BLD AUTO: 8.4 FL (ref 5–10.5)
POTASSIUM REFLEX MAGNESIUM: 5.3 MMOL/L (ref 3.5–5.1)
POTASSIUM REFLEX MAGNESIUM: 6.1 MMOL/L (ref 3.5–5.1)
RBC # BLD: 4.33 M/UL (ref 4.2–5.9)
SODIUM BLD-SCNC: 139 MMOL/L (ref 136–145)
TOTAL PROTEIN: 7.6 G/DL (ref 6.4–8.2)
WBC # BLD: 10.2 K/UL (ref 4–11)

## 2021-01-28 PROCEDURE — 2500000003 HC RX 250 WO HCPCS: Performed by: NURSE PRACTITIONER

## 2021-01-28 PROCEDURE — 2580000003 HC RX 258: Performed by: NURSE PRACTITIONER

## 2021-01-28 PROCEDURE — 36415 COLL VENOUS BLD VENIPUNCTURE: CPT

## 2021-01-28 PROCEDURE — 6360000002 HC RX W HCPCS: Performed by: NURSE PRACTITIONER

## 2021-01-28 PROCEDURE — 6370000000 HC RX 637 (ALT 250 FOR IP): Performed by: INTERNAL MEDICINE

## 2021-01-28 PROCEDURE — 93010 ELECTROCARDIOGRAM REPORT: CPT | Performed by: INTERNAL MEDICINE

## 2021-01-28 PROCEDURE — 85025 COMPLETE CBC W/AUTO DIFF WBC: CPT

## 2021-01-28 PROCEDURE — 6370000000 HC RX 637 (ALT 250 FOR IP): Performed by: NURSE PRACTITIONER

## 2021-01-28 PROCEDURE — 80053 COMPREHEN METABOLIC PANEL: CPT

## 2021-01-28 PROCEDURE — 2580000003 HC RX 258: Performed by: INTERNAL MEDICINE

## 2021-01-28 RX ORDER — DEXTROSE MONOHYDRATE 25 G/50ML
25 INJECTION, SOLUTION INTRAVENOUS PRN
Status: DISCONTINUED | OUTPATIENT
Start: 2021-01-28 | End: 2021-01-28 | Stop reason: HOSPADM

## 2021-01-28 RX ORDER — SODIUM POLYSTYRENE SULFONATE 15 G/60ML
15 SUSPENSION ORAL; RECTAL ONCE
Status: COMPLETED | OUTPATIENT
Start: 2021-01-28 | End: 2021-01-28

## 2021-01-28 RX ORDER — OXYCODONE HYDROCHLORIDE 5 MG/1
5 TABLET ORAL ONCE
Status: COMPLETED | OUTPATIENT
Start: 2021-01-28 | End: 2021-01-28

## 2021-01-28 RX ADMIN — ALLOPURINOL 100 MG: 100 TABLET ORAL at 09:52

## 2021-01-28 RX ADMIN — HEPARIN SODIUM 5000 UNITS: 5000 INJECTION INTRAVENOUS; SUBCUTANEOUS at 06:42

## 2021-01-28 RX ADMIN — OXYCODONE 5 MG: 5 TABLET ORAL at 06:46

## 2021-01-28 RX ADMIN — PANTOPRAZOLE SODIUM 40 MG: 40 TABLET, DELAYED RELEASE ORAL at 06:46

## 2021-01-28 RX ADMIN — SODIUM BICARBONATE: 84 INJECTION, SOLUTION INTRAVENOUS at 00:10

## 2021-01-28 RX ADMIN — SODIUM ZIRCONIUM CYCLOSILICATE 10 G: 10 POWDER, FOR SUSPENSION ORAL at 09:52

## 2021-01-28 RX ADMIN — SODIUM POLYSTYRENE SULFONATE 15 G: 15 SUSPENSION ORAL; RECTAL at 01:18

## 2021-01-28 RX ADMIN — INSULIN HUMAN 5 UNITS: 100 INJECTION, SOLUTION PARENTERAL at 01:28

## 2021-01-28 RX ADMIN — AMLODIPINE BESYLATE 10 MG: 5 TABLET ORAL at 09:52

## 2021-01-28 RX ADMIN — CITALOPRAM HYDROBROMIDE 10 MG: 20 TABLET ORAL at 09:52

## 2021-01-28 RX ADMIN — DEXTROSE MONOHYDRATE 25 G: 25 INJECTION, SOLUTION INTRAVENOUS at 01:18

## 2021-01-28 NOTE — ED NOTES
Called [facility] @ 2029  RE: hyperkalemia--dr. Herlinda Samuel per Tawny Logan, NP  Dr. Alexandro Dragon called back @ 2049       Avelina Barrientos  01/27/21 2053

## 2021-01-28 NOTE — ED PROVIDER NOTES
EMERGENCY DEPARTMENT ENCOUNTER      This patient was seen and evaluated by the attending physician. Pt Name: Howie Mondragon  MRN: [id number]  Derrickgfcaty 1980  Date of evaluation: 1/27/2021  Provider: ANA BensonC  PCP: Claudio Luz DO  ED Attending: Stefani Bah MD    History provided by the patient. CHIEF COMPLAINT:     Chief Complaint   Patient presents with    Abnormal Lab     labs done yesterday abnormal potassium pt also with some off and on palpations       HISTORY OF PRESENT ILLNESS:      Howie Mondragon is a 45 y.o. male who presents [facility]  ED with complaints of having abnormal labs. Patient states his potassium is elevated. Patient has history of chronic kidney disease, saw nephrology who ordered outpatient labs, potassium was 6.5. States that he did have some on and off palpitations although no chest pain, no symptoms currently. Denies shortness of breath, denies any abdominal pain, nausea or vomiting. He is here for further evaluation. Location:-  Quality:-  Severity:-  Duration:-  Modifying factors:-    Nursing Notes were reviewed     REVIEW OF SYSTEMS:     Review of Systems  All systems, atotal of 10, are reviewed and negative except for those that were just noted in history present illness.         PAST MEDICAL HISTORY:     Past Medical History:   Diagnosis Date    ADD (attention deficit disorder with hyperactivity)     Hyperlipidemia     Hypertension     PKD (polycystic kidney disease)     Renal disease     polycytic         SURGICAL HISTORY:      Past Surgical History:   Procedure Laterality Date    HERNIA REPAIR      OTHER SURGICAL HISTORY Bilateral 5/15/13    BILATERAL LAPAROSCOPIC PREPERITONEAL INGUINAL HERNIA REPAIR    UMBILICAL HERNIA REPAIR N/A 4/75/4375    OPEN UMBILICAL HERNIA REPAIR performed by Mirlande Hubbard MD at [address]  3/15/2011    VASECTOMY      SANDY TOOTH EXTRACTION           CURRENT MEDICATIONS:       Previous Medications    ALLOPURINOL (ZYLOPRIM) 100 MG TABLET    Take 1 tablet by mouth daily    AMLODIPINE (NORVASC) 10 MG TABLET    Take 1 tablet by mouth daily    CITALOPRAM (CELEXA) 10 MG TABLET    Take 1 tablet by mouth daily    CITALOPRAM (CELEXA) 20 MG TABLET    Take 1 tablet by mouth daily    DEXLANSOPRAZOLE (DEXILANT) 30 MG CPDR DELAYED RELEASE CAPSULE    Take 30 mg by mouth daily    LISINOPRIL (PRINIVIL;ZESTRIL) 40 MG TABLET    Take 1 tablet by mouth daily    METHYLPHENIDATE (RITALIN) 10 MG TABLET    Take 10 mg by mouth 2 times daily. METHYLPREDNISOLONE (MEDROL DOSEPACK) 4 MG TABLET    Take by mouth.     SIMVASTATIN (ZOCOR) 20 MG TABLET    Take 1 tablet by mouth every evening         ALLERGIES:    Bee pollen, Hydrocodone, Iodine, Nsaids, and Peanut-containing drug products    FAMILY HISTORY:       Family History   Problem Relation Age of Onset    Kidney Disease Mother     Heart Disease Mother         CHF    High Blood Pressure Sister     High Cholesterol Sister     Other Sister         PKD    High Blood Pressure Brother     High Cholesterol Brother     Other Brother         PKD          SOCIAL HISTORY:       Social History     Socioeconomic History    Marital status:      Spouse name: None    Number of children: None    Years of education: None    Highest education level: None   Occupational History    None   Social Needs    Financial resource strain: None    Food insecurity     Worry: None     Inability: None    Transportation needs     Medical: None     Non-medical: None   Tobacco Use    Smoking status: Current Some Day Smoker     Packs/day: 0.25     Years: 26.00     Pack years: 6.50     Types: Cigarettes    Smokeless tobacco: Never Used    Tobacco comment: 1 pack per month   Substance and Sexual Activity    Alcohol use: Yes     Comment: rarely    Drug use: Not Currently    Sexual activity: Yes     Partners: Female Lifestyle    Physical activity     Days per week: None     Minutes per session: None    Stress: None   Relationships    Social connections     Talks on phone: None     Gets together: None     Attends Sabianist service: None     Active member of club or organization: None     Attends meetings of clubs or organizations: None     Relationship status: None    Intimate partner violence     Fear of current or ex partner: None     Emotionally abused: None     Physically abused: None     Forced sexual activity: None   Other Topics Concern    None   Social History Narrative    None       SCREENINGS:            PHYSICAL EXAM:       ED Triage Vitals   BP Temp Temp Source Pulse Resp SpO2 Height Weight   01/27/21 1858 01/27/21 1858 01/27/21 1858 01/27/21 1845 01/27/21 1858 01/27/21 1858 01/27/21 1858 01/27/21 1858   (!) 143/94 98.4 °F (36.9 °C) Oral 106 16 98 % 6' (1.829 m) 219 lb (99.3 kg)       Physical Exam    CONSTITUTIONAL: Awake and alert. Cooperative. Well-developed. Well-nourished. Vitals:    01/27/21 1845 01/27/21 1858   BP:  (!) 143/94   Pulse: 106 93   Resp:  16   Temp:  98.4 °F (36.9 °C)   TempSrc:  Oral   SpO2:  98%   Weight:  219 lb (99.3 kg)   Height:  6' (1.829 m)     HENT: Normocephalic. Atraumatic. External ears normal, without discharge. TMs clear bilaterally. No nasal discharge. Oropharynx clear, no erythema. Mucous membranes moist.  EYES: Conjunctiva non-injected, no lid abnormalities noted. No scleral icterus. PERRL. EOM's grossly intact. Anterior chambers clear. NECK: Supple. Normal ROM. No meningismus. No thyroid tenderness or swelling noted. CARDIOVASCULAR: RRR. No Murmer. PULMONARY/CHEST WALL: Effort normal. No tachypnea. Lungs clear to ausculation. ABDOMEN: Normal BS. Soft. Nondistended. No tenderness to palpate. No guarding. No hernias noted. No splenomegaly. Back: Spine is midline. No ecchymosis. No crepitus on palpation. No obvious subluxation of vertebral column.  No saddle anesthesia or evidence of cauda equina. /ANORECTAL: Not assessed  MUSKULOSKELETAL: Normal ROM. No acute deformities. No edema. No tenderness to palpate. SKIN: Warm and dry. NEUROLOGICAL:  GCS 15. CN II-XII grossly intact. Strength is 5/5 in allextremities and sensation is intact. PSYCHIATRIC: Normal affect, normal insight and judgement. Alert andoriented x 3.         DIAGNOSTIC RESULTS:     LABS:    Results for orders placed or performed during the hospital encounter of 01/27/21   CBC Auto Differential   Result Value Ref Range    WBC 12.0 (H) 4.0 - 11.0 K/uL    RBC 4.49 4.20 - 5.90 M/uL    Hemoglobin 12.6 (L) 13.5 - 17.5 g/dL    Hematocrit 38.2 (L) 40.5 - 52.5 %    MCV 85.1 80.0 - 100.0 fL    MCH 28.1 26.0 - 34.0 pg    MCHC 33.0 31.0 - 36.0 g/dL    RDW 13.7 12.4 - 15.4 %    Platelets 448 406 - 425 K/uL    MPV 8.2 5.0 - 10.5 fL    Neutrophils % 90.0 %    Lymphocytes % 5.4 %    Monocytes % 4.4 %    Eosinophils % 0.0 %    Basophils % 0.2 %    Neutrophils Absolute 10.8 (H) 1.7 - 7.7 K/uL    Lymphocytes Absolute 0.6 (L) 1.0 - 5.1 K/uL    Monocytes Absolute 0.5 0.0 - 1.3 K/uL    Eosinophils Absolute 0.0 0.0 - 0.6 K/uL    Basophils Absolute 0.0 0.0 - 0.2 K/uL   Comprehensive Metabolic Panel w/ Reflex to MG   Result Value Ref Range    Sodium 138 136 - 145 mmol/L    Potassium reflex Magnesium 6.3 (HH) 3.5 - 5.1 mmol/L    Chloride 109 99 - 110 mmol/L    CO2 17 (L) 21 - 32 mmol/L    Anion Gap 12 3 - 16    Glucose 115 (H) 70 - 99 mg/dL    BUN 63 (H) 7 - 20 mg/dL    CREATININE 4.0 (H) 0.9 - 1.3 mg/dL    GFR Non-African American 17 (A) >60    GFR  20 (A) >60    Calcium 10.0 8.3 - 10.6 mg/dL    Total Protein 8.5 (H) 6.4 - 8.2 g/dL    Albumin 5.1 (H) 3.4 - 5.0 g/dL    Albumin/Globulin Ratio 1.5 1.1 - 2.2    Total Bilirubin <0.2 0.0 - 1.0 mg/dL    Alkaline Phosphatase 82 40 - 129 U/L    ALT 20 10 - 40 U/L    AST 16 15 - 37 U/L    Globulin 3.4 g/dL   Magnesium   Result Value Ref Range    Magnesium 2.10 1.80 - 2.40 mg/dL Troponin   Result Value Ref Range    Troponin <0.01 <0.01 ng/mL   EKG 12 Lead   Result Value Ref Range    Ventricular Rate 86 BPM    Atrial Rate 86 BPM    P-R Interval 152 ms    QRS Duration 74 ms    Q-T Interval 334 ms    QTc Calculation (Bazett) 399 ms    P Axis 47 degrees    R Axis 27 degrees    T Axis 36 degrees    Diagnosis       Normal sinus rhythmNormal ECGWhen compared with ECG of 17-APR-2019 07:04,No significant change was found         RADIOLOGY:  All x-ray studies are viewed/reviewedby me. Formal interpretations per the radiologist are as follows: No orders to display           EKG:  See EKG interpretation by an attending phsyician      PROCEDURES:   N/A    CRITICAL CARE TIME:   Total critical care time today provided was at least 35 minutes. This excludes seperately billable procedure. Critical care time provided for severe hyperkalemia requiring IV intervention that required close evaluation and/or intervention with concern for patient decompensation.      CONSULTS:  [address] 761 TO HOSPITALIST      EMERGENCYDEPARTMENT COURSE and DIFFERENTIAL DIAGNOSIS/MDM:   Vitals:    Vitals:    01/27/21 1845 01/27/21 1858   BP:  (!) 143/94   Pulse: 106 93   Resp:  16   Temp:  98.4 °F (36.9 °C)   TempSrc:  Oral   SpO2:  98%   Weight:  219 lb (99.3 kg)   Height:  6' (1.829 m)       Patient was given the following medications:  Medications   calcium gluconate 10 % injection 1,000 mg (has no administration in time range)   insulin regular (HUMULIN R;NOVOLIN R) injection 5 Units (has no administration in time range)   dextrose 50 % IV solution (has no administration in time range)   sodium bicarbonate 8.4 % injection 25 mEq (has no administration in time range)   0.45 % sodium chloride infusion (has no administration in time range)   sodium zirconium cyclosilicate (LOKELMA) oral suspension 10 g (has no administration in time range)         Patient was evaluated by both myself and Ramón Barclay Cece Rosas MD.    Patient presented to the emergency room today with complaints of hyperkalemia. Patient laboratory studies today confirm hyperkalemia at 6.3 with creatinine of 4. History of chronic kidney disease. Patient nephrologist was consulted, spoke with Dr. Giselle Haider who recommended medications for tempering patient potassium and recommended admission to the hospital.  Patient was unhappy with this but did agree with admission. I had the attending physician evaluate the patient as well. Patient was given calcium, insulin dextrose as well as sodium bicarb here in the ED and admitted in stable condition, I discussed case the hospitalist was also in agreement    Patient laboratory studies, radiographic imaging, andassessment were all discussed with the patient and/or patient family. There was shared decision-making between myself, the attending physician, as well as the patient and/or their surrogate and we are all in agreement with admission. There was an opportunity for questions and all questions were answered to the best of my ability and to the satisfaction of the patient and/or patient family. FINAL IMPRESSION:      1. Hyperkalemia    2. Chronic kidney disease, unspecified CKD stage          DISPOSITION/PLAN:   DISPOSITIONDecision To Admit      PATIENT REFERRED TO:  No follow-up provider specified.     DISCHARGE MEDICATIONS:  New Prescriptions    No medications on file                  (Please note that portions of this note were completed with a voice recognition program.  Efforts were made to edit the dictations, but occasionally words are mis-transcribed.)    ANA Garcia CNP-C (electronically signed)        ANA Garcia CNP  01/27/21 9303

## 2021-01-28 NOTE — CONSULTS
[facility]  Consult Note           Reason for Consult:  Chronic kidney disease, Hyperkalemia  Requesting Physician:  Dr. Bala Hanson    Chief Complaint:  Abnormal labs  History Obtained From:  patient, electronic medical record    History of Present Ilness:    45year old male with ADPCKD, CKD-4, HTN admitted with abnormal labs. We have been asked to assist in further mgmt of his CKD, Hyperkalemia. Had K of 6.5 on outpatient labs, improved with supportive tx, IVF's with bicarb. Denies any sob, chest pain, abdominal pain, weakness, or fevers. Past Medical History:        Diagnosis Date    ADD (attention deficit disorder with hyperactivity)     Hyperlipidemia     Hypertension     PKD (polycystic kidney disease)     Renal disease     polycytic       Past Surgical History:        Procedure Laterality Date    HERNIA REPAIR      OTHER SURGICAL HISTORY Bilateral 5/15/13    BILATERAL LAPAROSCOPIC PREPERITONEAL INGUINAL HERNIA REPAIR    UMBILICAL HERNIA REPAIR N/A 4/06/2850    OPEN UMBILICAL HERNIA REPAIR performed by Michelle Coles MD at [place] [address]  3/15/2011    VASECTOMY      WISDOM TOOTH EXTRACTION         Home Medications:    No current facility-administered medications on file prior to encounter. Current Outpatient Medications on File Prior to Encounter   Medication Sig Dispense Refill    simvastatin (ZOCOR) 20 MG tablet Take 1 tablet by mouth every evening (Patient taking differently: Take 40 mg by mouth every evening ) 30 tablet 3    methylPREDNISolone (MEDROL DOSEPACK) 4 MG tablet Take by mouth.  1 kit 0    citalopram (CELEXA) 10 MG tablet Take 1 tablet by mouth daily 30 tablet 3    allopurinol (ZYLOPRIM) 100 MG tablet Take 1 tablet by mouth daily 30 tablet 3    amLODIPine (NORVASC) 10 MG tablet Take 1 tablet by mouth daily 30 tablet 3    dexlansoprazole (DEXILANT) 30 MG CPDR delayed release capsule Take 30 mg by mouth daily 30 Relation Age of Onset    Kidney Disease Mother     Heart Disease Mother         CHF    High Blood Pressure Sister     High Cholesterol Sister     Other Sister         PKD    High Blood Pressure Brother     High Cholesterol Brother     Other Brother         PKD       Review of Systems:   Pertinent positives stated above in HPI. Remainder of 10 point review of systems were reviewed and were negative.     Physical exam:   Constitutional:  VITALS:  /76   Pulse 89   Temp 97.8 °F (36.6 °C) (Oral)   Resp 18   Ht 6' (1.829 m)   Wt 220 lb 14.4 oz (100.2 kg)   SpO2 97%   BMI 29.96 kg/m²   Gen: alert, awake, nad  Skin: no rash, turgor wnl  Heent:  eomi, mmm  Neck: no bruits or jvd noted, thyroid normal  Cardiovascular:  S1, S2 without m/r/g  Respiratory: CTA B without w/r/r; respiratory effort normal  Abdomen:  +bs, soft, nt, nd, no hepatosplenomegaly  Ext: no lower extremity edema  Psychiatric: mood and affect appropriate; judgement and insight intact  Musculoskeletal:  Rom, muscular strength intact; digits, nails normal    Data/  CBC:   Lab Results   Component Value Date    WBC 10.2 01/28/2021    RBC 4.33 01/28/2021    HGB 11.9 01/28/2021    HCT 37.0 01/28/2021    MCV 85.3 01/28/2021    MCH 27.6 01/28/2021    MCHC 32.3 01/28/2021    RDW 14.2 01/28/2021     01/28/2021    MPV 8.4 01/28/2021     BMP:    Lab Results   Component Value Date     01/28/2021    K 5.3 01/28/2021     01/28/2021    CO2 21 01/28/2021    BUN 63 01/28/2021    LABALBU 4.7 01/28/2021    CREATININE 3.8 01/28/2021    CALCIUM 9.3 01/28/2021    GFRAA 22 01/28/2021    GFRAA >60 05/02/2013    LABGLOM 18 01/28/2021    GLUCOSE 87 01/28/2021         Assessment/    - Hyperkalemia   Better with medical mgmt with lokelma, kayexelate, IVF's with bicarb    - 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 Please do not hesitate to call with questions.     [employer]

## 2021-01-28 NOTE — CARE COORDINATION
Met with patient at bedside. IPTA. Has PCP and payor source. Denies needs. Please notify CM if dc needs arise.     Prudy Early, RN

## 2021-01-28 NOTE — ED NOTES
Report give to [facility].      December ROSEANN Jones  01/27/21 9578

## 2021-01-28 NOTE — ED PROVIDER NOTES
[address] [place] [address]  ED  [address]  [address]  Dept: [phone]  Dept Fax: [phone]  Loc: [phone]    Open Note Time:  9:29 PM EST    I independently performed a history and physical on Junior Georges. Chief Complaint  Abnormal Lab (labs done yesterday abnormal potassium pt also with some off and on palpations)       This is a very pleasant 45 y.o. male  who was evaluated in the emergency department for elevated potassium in the setting of moderate kidney disease without dialysis    EKG  The Ekg interpreted by me shows  normal sinus rhythm with a rate of 89  Axis is   Normal  QTc is  within an acceptable range  Intervals and Durations are unremarkable. ST Segments: no acute change and normal  Delta waves, Brugada Syndrome, and Short RI are not present. Prior EKG to compare with was available. No significant changes compared to prior EKG from 4/17/2019      Unless otherwise stated in this report or unable to obtain because of the patient's clinical or mental status as evidenced by the medical record, this patient's positive and negative responses for review of systems, constitutional, psych, eyes, ENT, cardiovascular, respiratory, gastrointestinal, neurological, genitourinary, musculoskeletal, integument systems and systems related to the presenting problem are either stated in the preceding paragraph or were not pertinent or were negative for the symptoms and/or complaints related to the medical problem. I wore goggles, surgical mask, N95 mask and gloves when I evaluated the patient. Focused exam:  Body mass index is 29.7 kg/m².   The physical exam reveals an alert and oriented patient who does not appear to be confused, non-ill-appearing, no abnormal heart sounds, no abnormal lung sounds, speaking comfortably in full sentences, bilateral CVA tenderness, benign abdominal exam    Brief ED course/MDM:     Procedures/interventions/images ordered for this visit  Orders Placed This Encounter   Procedures    CBC Auto Differential    Comprehensive Metabolic Panel w/ Reflex to MG    Magnesium    Troponin    Inpatient consult to Nephrology    Inpatient consult to Hospitalist    EKG 12 Lead       Medications ordered for this visit  Orders Placed This Encounter   Medications    calcium gluconate 10 % injection 1,000 mg    insulin regular (HUMULIN R;NOVOLIN R) injection 5 Units    dextrose 50 % IV solution    sodium bicarbonate 8.4 % injection 25 mEq    0.45 % sodium chloride infusion     Add 75meq of sodium bicarb    DISCONTD: sodium zirconium cyclosilicate (LOKELMA) oral suspension 10 g    sodium zirconium cyclosilicate (LOKELMA) oral suspension 10 g       ED course notes for this visit         Patient seen and evaluated in room 31/31      Final Impression  1. Hyperkalemia    2. Chronic kidney disease, unspecified CKD stage        Blood pressure (!) 143/94, pulse 93, temperature 98.4 °F (36.9 °C), temperature source Oral, resp. rate 16, height 6' (1.829 m), weight 219 lb (99.3 kg), SpO2 98 %. Disposition  Patient understands that they are going to be admitted to the hospital.  There was shared decision making between myself as well as the patient and/or their surrogate and we are in agreement with admission. There is an opportunity for questions and all questions answered to the best of my ability and to the satisfaction of the patient and/or patient's family. Please note, critical care time was at least 15 minutes, obtaining history, conducting a physical exam, performing and monitoring interventions, ordering, collecting and interpreting tests, and establishing medical decision-making and discussion with the patient and/or family, specifically for management of the presenting complaint and symptoms initially, direct bedside care, reevaluation, review of records, and consultation.   There was a high probability of clinically

## 2021-01-28 NOTE — DISCHARGE SUMMARY
Hospital Medicine Discharge Summary      Patient Identification:   Naye Flood   : 1980  MRN: [id number]   Account: [de-identified]      Patient's PCP: Sarah Betts DO    Admit Date: 2021     Discharge Date:   2021    Admitting Physician: Arias Ascencio MD     Discharge Physician: Ori Moscoso MD     Consults:     IP CONSULT TO NEPHROLOGY  IP CONSULT TO HOSPITALIST  IP CONSULT TO NEPHROLOGY    Disposition: Home    Condition at Discharge: Stable    Code Status:  Full Code       Discharge Diagnoses:       Acute hyperkalemia  Acute kidney injury    Polycystic kidney disease  Dyslipidemia  Gout  Essential hypertension  GERD without esophagitis      Hospital Course:   Naye Flood is a 45 y.o. male hospitalized   2021   hyperkalemia, he was also noted to have elevated creatinine, baseline around 3. Patient received Kayexalate, bicarb, calcium infusion and Lokalemia. Patient condition improved sooner than expected,   potassium level improved to 5.3, creatinine dropped to 3.8. Patient evaluated by nephrology service in consultation. Patient discharged home off lisinopril. He has lab work set up by nephrology next week. Patient seen and examined in the day of discharge. Vital signs reviewed. /76   Pulse 89   Temp 97.8 °F (36.6 °C) (Oral)   Resp 18   Ht 6' (1.829 m)   Wt 220 lb 14.4 oz (100.2 kg)   SpO2 97%   BMI 29.96 kg/m²     Patient alert, oriented, comfortable, clear lungs, no lower extremity edema. Abdomen soft. *. Patient reached the maximum benefit of this hospitalization. Patient  was hemodynamically stable on discharge   Available consultant are in agreement with discharge planning. Patient symptoms was controlled and in agreement with discharge planning. Patient Instructions:    Discharge lab/important testing/finding that need follow up :  Consideration of tolvaptan outpatient.     Activity: activity as tolerated    Diet: DIET RENAL; Follow-up visits:   Melissa Arciniega, [address] [facility] [address], [address] [id number]  [phone]    In 1 week  lab work follow up          Discharge Medications:      Chadd Loza   Home Medication Instructions IAR:[id number]    Printed on:01/28/21 6736   Medication Information                      allopurinol (ZYLOPRIM) 100 MG tablet  Take 1 tablet by mouth daily             amLODIPine (NORVASC) 10 MG tablet  Take 1 tablet by mouth daily             citalopram (CELEXA) 10 MG tablet  Take 1 tablet by mouth daily             dexlansoprazole (DEXILANT) 30 MG CPDR delayed release capsule  Take 30 mg by mouth daily             simvastatin (ZOCOR) 20 MG tablet  Take 1 tablet by mouth every evening                     Labs: For convenience and continuity at follow-up the following most recent labs are provided:      CBC:    Lab Results   Component Value Date    WBC 10.2 01/28/2021    HGB 11.9 01/28/2021    HCT 37.0 01/28/2021     01/28/2021       Renal:    Lab Results   Component Value Date     01/28/2021    K 5.3 01/28/2021     01/28/2021    CO2 21 01/28/2021    BUN 63 01/28/2021    CREATININE 3.8 01/28/2021    CALCIUM 9.3 01/28/2021    PHOS 3.9 01/25/2021         Significant Diagnostic Studies    Radiology:   No orders to display            Time Spent on discharge is 35 in the examination, evaluation, counseling and review of medications and discharge plan. Thank you Kahlil Peters DO for the opportunity to be involved in this patient's care.          Electronically signed by Abdullahi Zambrano MD on 1/28/2021 at 2:04 PM

## 2021-01-28 NOTE — H&P
Hospital Medicine History & Physical      PCP: Yaneth Berkowitz DO    Date of Admission: 1/27/2021    Date of Service: Pt seen/examined on 1/27/2021 and Admitted to Inpatient with expected LOS greater than two midnights due to medical therapy. Chief Complaint:    Chief Complaint   Patient presents with    Abnormal Lab     labs done yesterday abnormal potassium pt also with some off and on palpations     History Of Present Illness:    45 y.o. male, with PMH of HTN, HLD, CKD 4, polycystic kidney disease, gout, GERD, who presented to [facility] with abnormal lab values. He states he was also having some random palpitations throughout the day today. History was obtained from the patient and review of the EMR. The patient has history of chronic kidney disease stage IV as well as polycystic kidney disease. He went to see his nephrologist, Dr. Jamari Magana, yesterday and had some labs drawn afterwards. He was noted to have significant hyperkalemia (6.5) and was told by his nephrologist to come to the ED for further evaluation. Upon arrival to the ED, the patient had potassium of 6.3, creatinine 4.0, and BUN 63. Nephrology was consulted in the ED who recommended admission. Past Medical History:          Diagnosis Date    ADD (attention deficit disorder with hyperactivity)     Hyperlipidemia     Hypertension     PKD (polycystic kidney disease)     Renal disease     polycytic       Past Surgical History:          Procedure Laterality Date    HERNIA REPAIR      OTHER SURGICAL HISTORY Bilateral 5/15/13    BILATERAL LAPAROSCOPIC PREPERITONEAL INGUINAL HERNIA REPAIR    UMBILICAL HERNIA REPAIR N/A 5/28/5970    OPEN UMBILICAL HERNIA REPAIR performed by Karen Culp MD at [address] [place] [address]  3/15/2011    VASECTOMY      WISDOM TOOTH EXTRACTION         Medications Prior to Admission:      Prior to Admission medications    Medication Sig Start Date End Date Taking? Ht 6' (1.829 m)   Wt 220 lb 14.4 oz (100.2 kg)   SpO2 97%   BMI 29.96 kg/m²     General appearance:  No apparent distress, appears stated age and cooperative. HEENT:  Normal cephalic, atraumatic without obvious deformity. Pupils equal, round, and reactive to light. Extra ocular muscles intact. Conjunctivae/corneas clear. Neck: Supple, with full range of motion. No jugular venous distention. Trachea midline. Respiratory:  Normal respiratory effort. Clear to auscultation, bilaterally without Rales/Wheezes/Rhonchi. Cardiovascular:  Regular rate and rhythm with normal S1/S2 without murmurs, rubs or gallops. Abdomen: Soft, non-tender, non-distended with normal bowel sounds. Musculoskeletal:  No clubbing, cyanosis or edema bilaterally. Full range of motion without deformity. Skin: Skin color, texture, turgor normal.  No rashes or lesions. Neurologic:  Neurovascularly intact without any focal sensory/motor deficits. Cranial nerves: II-XII intact, grossly non-focal.  Psychiatric:  Alert and oriented, thought content appropriate, normal insight  Capillary Refill: Brisk,< 3 seconds   Peripheral Pulses: +2 palpable, equal bilaterally       Labs:     Recent Labs     01/25/21  1254 01/27/21 1918   WBC 5.7 12.0*   HGB 13.1* 12.6*   HCT 39.4* 38.2*    337     Recent Labs     01/25/21  1254 01/26/21  1210 01/27/21 1918 01/27/21  2351    139 138  --    K 5.7* 6.5* 6.3* 6.1*    104 109  --    CO2 20* 20* 17*  --    BUN 52* 59* 63*  --    CREATININE 3.7* 4.1* 4.0*  --    CALCIUM 9.9 10.1 10.0  --    PHOS 3.9  --   --   --      Recent Labs     01/26/21  1210 01/27/21 1918   AST 17 16   ALT 21 20   BILITOT <0.2 <0.2   ALKPHOS 83 82     No results for input(s): INR in the last 72 hours.   Recent Labs     01/27/21 1918   TROPONINI <0.01       Urinalysis:      Lab Results   Component Value Date    NITRU Negative 01/25/2021    WBCUA 10-20 01/25/2021    BACTERIA 1+ 08/20/2020    RBCUA 5-10 01/25/2021 BLOODU MODERATE 01/25/2021    SPECGRAV 1.015 01/25/2021    GLUCOSEU Negative 01/25/2021    GLUCOSEU NEGATIVE 09/21/2011       Radiology:     CXR: I have reviewed the CXR with the following interpretation: n/a  EKG:  I have reviewed the EKG with the following interpretation: NSR    No orders to display       ASSESSMENT:    Active Hospital Problems    Diagnosis Date Noted    Hyperkalemia [E87.5] 01/27/2021         PLAN:    Hyperkalemia, 6.3 on admission. Hx of CKD4 and polycystic kidney disease, [facility], Dr. Ronel Breaux. Recent referral for transplant evaluation.  - Given D50, insulin regular, sodium bicarb, calcium gluconate, and lokelma in the ED  - Nephrology consulted who recommended admission, lokelma, and 1/2NS infusion, thank you for recs  - Will re-check potassium level at midnight  - Tele monitoring    DAMASO on CKD4. Baseline Cr ~3, now up to 4.1 on admission  - Nephrology consulted, thank you for recommendations  - 1/2NS infusion per nephrology  - Avoid nephrotoxins as able  - Monitor with BMP    Essential HTN, controlled. - Continue home lisinopril, norvasc  - Telemetry monitoring    Hx of gout, does not appear to be in acute exacerbation  - Continue allopurinol    Hx of HLD, controlled with statin. - Continue home statin  - Follow-up with PCP for med adjustments    GERD - w/out active signs/sxs of dysphagia/odynophagia. No evidence of active PUD or hx of GI bleed. Controlled on home PPI - continue      DVT Prophylaxis: Heparin subcu  Diet: DIET RENAL;  Code Status: Full Code    PT/OT Eval Status: Not indicated    Dispo -pending clinical improvement and nephrology consultation       Eric Mckeon. ANA Tony - NP    Thank you Kelly Eldridge DO for the opportunity to be involved in this patient's care.  If you have any questions or concerns please feel free to contact me at [phone].  -------------------------Anticipated Dr. Annemarie pat----------------------

## 2021-03-23 DIAGNOSIS — F41.9 ANXIETY: ICD-10-CM

## 2021-03-23 DIAGNOSIS — K21.9 GASTROESOPHAGEAL REFLUX DISEASE WITHOUT ESOPHAGITIS: ICD-10-CM

## 2021-03-23 DIAGNOSIS — R45.89 DEPRESSED MOOD: ICD-10-CM

## 2021-03-23 RX ORDER — CITALOPRAM 10 MG/1
TABLET ORAL
Qty: 90 TABLET | Refills: 1 | Status: SHIPPED | OUTPATIENT
Start: 2021-03-23 | End: 2021-11-12 | Stop reason: SDUPTHER

## 2021-04-09 ENCOUNTER — TELEPHONE (OUTPATIENT)
Dept: FAMILY MEDICINE CLINIC | Age: 41
End: 2021-04-09

## 2021-04-09 DIAGNOSIS — Z20.2 EXPOSURE TO TRICHOMONAS: ICD-10-CM

## 2021-04-09 DIAGNOSIS — Z20.2 EXPOSURE TO TRICHOMONAS: Primary | ICD-10-CM

## 2021-04-09 RX ORDER — METRONIDAZOLE 500 MG/1
2000 TABLET ORAL ONCE
Qty: 4 TABLET | Refills: 0 | Status: SHIPPED | OUTPATIENT
Start: 2021-04-09 | End: 2021-04-09

## 2021-04-09 RX ORDER — METRONIDAZOLE 500 MG/1
2000 TABLET ORAL ONCE
Qty: 4 TABLET | Refills: 0 | Status: SHIPPED | OUTPATIENT
Start: 2021-04-09 | End: 2021-04-09 | Stop reason: SDUPTHER

## 2021-04-09 RX ORDER — METRONIDAZOLE 500 MG/1
2000 TABLET ORAL ONCE
Qty: 4 TABLET | Refills: 0 | Status: CANCELLED | OUTPATIENT
Start: 2021-04-09 | End: 2021-04-09

## 2021-04-09 NOTE — TELEPHONE ENCOUNTER
Medication sent to the pharmacy. Please tell the patient he can not drink alcohol while taking the medication as it will make him sick. The dose should be taken all at the same time.

## 2021-04-09 NOTE — TELEPHONE ENCOUNTER
Pt was advised about medication and having transferred. States he does not know anything about doing that. I advised he would call [facility] and have them transfer the medication from here. He states he can't do that. Wants us to resend. Updated Pharmacy.

## 2021-04-09 NOTE — TELEPHONE ENCOUNTER
----- Message from Mirtha Mejía sent at 4/9/2021  8:29 AM EDT -----  Subject: Message to Provider    QUESTIONS  Information for Provider? Pt states his ex girlfriend told him she has a   std   pt would like please advise . [facility] [phone]  ---------------------------------------------------------------------------  --------------  Eduar HAND  What is the best way for the office to contact you? OK to leave message on   voicemail  Preferred Call Back Phone Number? [phone]  ---------------------------------------------------------------------------  --------------  SCRIPT ANSWERS  Relationship to Patient?  Self

## 2021-04-20 RX ORDER — AMLODIPINE BESYLATE 10 MG/1
TABLET ORAL
Qty: 30 TABLET | Refills: 3 | Status: SHIPPED | OUTPATIENT
Start: 2021-04-20 | End: 2021-07-19

## 2021-04-20 RX ORDER — ALLOPURINOL 100 MG/1
TABLET ORAL
Qty: 30 TABLET | Refills: 3 | Status: SHIPPED | OUTPATIENT
Start: 2021-04-20 | End: 2021-04-23 | Stop reason: SDUPTHER

## 2021-04-20 NOTE — TELEPHONE ENCOUNTER
Future Appointments   Date Time Provider Mikey Licona   7/6/2021 10:45 AM Paul Comment, DO [facility]     1/26/2021  LOV

## 2021-04-20 NOTE — TELEPHONE ENCOUNTER
Future Appointments   Date Time Provider Mikey Licona   7/6/2021 10:45 AM DO [facility]  [facility]     LOV 1/26/2021

## 2021-04-26 RX ORDER — SIMVASTATIN 20 MG
40 TABLET ORAL NIGHTLY
Qty: 180 TABLET | Refills: 1 | Status: SHIPPED | OUTPATIENT
Start: 2021-04-26 | End: 2021-04-27 | Stop reason: ALTCHOICE

## 2021-04-27 RX ORDER — SIMVASTATIN 40 MG
40 TABLET ORAL NIGHTLY
Qty: 90 TABLET | Refills: 1 | Status: SHIPPED | OUTPATIENT
Start: 2021-04-27 | End: 2021-10-27

## 2021-06-08 RX ORDER — CLONIDINE 0.3 MG/24H
PATCH, EXTENDED RELEASE TRANSDERMAL
Status: ON HOLD | COMMUNITY
Start: 2021-04-01 | End: 2022-05-04 | Stop reason: HOSPADM

## 2021-06-08 SDOH — ECONOMIC STABILITY: FOOD INSECURITY: WITHIN THE PAST 12 MONTHS, YOU WORRIED THAT YOUR FOOD WOULD RUN OUT BEFORE YOU GOT MONEY TO BUY MORE.: NEVER TRUE

## 2021-06-08 SDOH — ECONOMIC STABILITY: TRANSPORTATION INSECURITY
IN THE PAST 12 MONTHS, HAS THE LACK OF TRANSPORTATION KEPT YOU FROM MEDICAL APPOINTMENTS OR FROM GETTING MEDICATIONS?: NO

## 2021-06-08 SDOH — ECONOMIC STABILITY: FOOD INSECURITY: WITHIN THE PAST 12 MONTHS, THE FOOD YOU BOUGHT JUST DIDN'T LAST AND YOU DIDN'T HAVE MONEY TO GET MORE.: NEVER TRUE

## 2021-06-08 SDOH — ECONOMIC STABILITY: INCOME INSECURITY: IN THE LAST 12 MONTHS, WAS THERE A TIME WHEN YOU WERE NOT ABLE TO PAY THE MORTGAGE OR RENT ON TIME?: NO

## 2021-06-08 SDOH — ECONOMIC STABILITY: HOUSING INSECURITY
IN THE LAST 12 MONTHS, WAS THERE A TIME WHEN YOU DID NOT HAVE A STEADY PLACE TO SLEEP OR SLEPT IN A SHELTER (INCLUDING NOW)?: NO

## 2021-06-08 SDOH — ECONOMIC STABILITY: TRANSPORTATION INSECURITY
IN THE PAST 12 MONTHS, HAS LACK OF TRANSPORTATION KEPT YOU FROM MEETINGS, WORK, OR FROM GETTING THINGS NEEDED FOR DAILY LIVING?: NO

## 2021-06-08 ASSESSMENT — SOCIAL DETERMINANTS OF HEALTH (SDOH): HOW HARD IS IT FOR YOU TO PAY FOR THE VERY BASICS LIKE FOOD, HOUSING, MEDICAL CARE, AND HEATING?: NOT HARD AT ALL

## 2021-06-08 ASSESSMENT — PATIENT HEALTH QUESTIONNAIRE - PHQ9
SUM OF ALL RESPONSES TO PHQ QUESTIONS 1-9: 0
2. FEELING DOWN, DEPRESSED OR HOPELESS: 0
1. LITTLE INTEREST OR PLEASURE IN DOING THINGS: 0
SUM OF ALL RESPONSES TO PHQ9 QUESTIONS 1 & 2: 0

## 2021-06-09 ENCOUNTER — VIRTUAL VISIT (OUTPATIENT)
Dept: FAMILY MEDICINE CLINIC | Age: 41
End: 2021-06-09
Payer: COMMERCIAL

## 2021-06-09 DIAGNOSIS — R39.9 UTI SYMPTOMS: ICD-10-CM

## 2021-06-09 DIAGNOSIS — Z11.3 SCREEN FOR STD (SEXUALLY TRANSMITTED DISEASE): Primary | ICD-10-CM

## 2021-06-09 PROCEDURE — 99213 OFFICE O/P EST LOW 20 MIN: CPT | Performed by: FAMILY MEDICINE

## 2021-06-09 RX ORDER — SULFAMETHOXAZOLE AND TRIMETHOPRIM 400; 80 MG/1; MG/1
1 TABLET ORAL 2 TIMES DAILY
Qty: 4 TABLET | Refills: 0 | Status: CANCELLED | OUTPATIENT
Start: 2021-06-09 | End: 2021-06-11

## 2021-06-09 RX ORDER — CIPROFLOXACIN 500 MG/1
500 TABLET, FILM COATED ORAL DAILY
Qty: 3 TABLET | Refills: 0 | Status: SHIPPED | OUTPATIENT
Start: 2021-06-09 | End: 2021-06-12

## 2021-06-09 RX ORDER — SULFAMETHOXAZOLE AND TRIMETHOPRIM 800; 160 MG/1; MG/1
1 TABLET ORAL ONCE
Qty: 1 TABLET | Refills: 0 | Status: CANCELLED | OUTPATIENT
Start: 2021-06-09 | End: 2021-06-09

## 2021-06-09 NOTE — PROGRESS NOTES
2021    TELEHEALTH EVALUATION -- Audio/Visual (During QTKRS-20 public health emergency)    HPI:    Niharika Lopez (:  1980) has requested an audio/video evaluation for the following concern(s):    Pt presents today via doxy. me Video visit for UTI symptoms including hematuria 5 days ago, bladder pain, had kidney stones 2 weeks ago and ruptured cysts, will be having a kidney transplant at [facility] but date not known yet. Went to dinner 5 days ago and had EtOH , felt nauseous afterwards, also admits to recent constipation. Admits to increased urinary frequency and nocturia. Urine has discoloration. Denies strong urine odor. Also states that his ex-girlfriend was positive for Trichomonas  and pt would like STD testing, previously treated for Trichomonas with atbx antbx for it, now having similar pain. Has new girlfriend. Review of Systems   Genitourinary: Positive for frequency and hematuria (previous). Positive for recent nocturia       Prior to Visit Medications    Medication Sig Taking? Authorizing Provider   ciprofloxacin (CIPRO) 500 MG tablet Take 1 tablet by mouth daily for 3 days Yes Brianna Meter, DO   simvastatin (ZOCOR) 40 MG tablet Take 1 tablet by mouth nightly Yes Brianna Meter, DO   allopurinol (ZYLOPRIM) 100 MG tablet TAKE 1 TABLET BY MOUTH EVERY DAY Yes Pallavi Barajas, DO   amLODIPine (NORVASC) 10 MG tablet TAKE 1 TABLET BY MOUTH EVERY DAY Yes Brianna Meter, DO   citalopram (CELEXA) 10 MG tablet TAKE 1 TABLET BY MOUTH EVERY DAY Yes Brianna Meter, DO   DEXILANT 30 MG CPDR delayed release capsule TAKE 1 CAPSULE BY MOUTH EVERY DAY Yes Brianna Meter, DO   cloNIDine (CATAPRES) 0.3 MG/24HR PTWK PLACE 1 PATCH ONTO THE SKIN EVERY 7 DAYS.   Historical Provider, MD       Social History     Tobacco Use    Smoking status: Current Some Day Smoker     Packs/day: 0.25     Years: 26.00     Pack years: 6.50     Types: Cigarettes    Smokeless tobacco: Never Used  Tobacco comment: 1 pack per month   Vaping Use    Vaping Use: Never used   Substance Use Topics    Alcohol use: Yes     Comment: rarely    Drug use: Not Currently        Allergies   Allergen Reactions    Bee Pollen     Hydrocodone Itching    Iodine Swelling    Nsaids      Does not take due to kidney function issues    Peanut-Containing Drug Products      [address] tree dust    [employer]    ,   Past Medical History:   Diagnosis Date    ADD (attention deficit disorder with hyperactivity)     Hyperlipidemia     Hypertension     PKD (polycystic kidney disease)     Renal disease     polycytic   ,   Past Surgical History:   Procedure Laterality Date    HERNIA REPAIR      OTHER SURGICAL HISTORY Bilateral 5/15/13    BILATERAL LAPAROSCOPIC PREPERITONEAL INGUINAL HERNIA REPAIR    UMBILICAL HERNIA REPAIR N/A 9/02/9914    OPEN UMBILICAL HERNIA REPAIR performed by Christian Clark MD at [place] [address]  3/15/2011    VASECTOMY      WISDOM TOOTH EXTRACTION         PHYSICAL EXAMINATION:  [ INSTRUCTIONS:  \"[x]\" Indicates a positive item  \"[]\" Indicates a negative item  -- DELETE ALL ITEMS NOT EXAMINED]  Vital Signs: (As obtained by patient/caregiver or practitioner observation)    Blood pressure- 143/78 Height - 6'    Weight - 217 lb    Constitutional: [x] Appears well-developed and well-nourished [x] No apparent distress      [] Abnormal-   Mental status  [x] Alert and awake  [x] Oriented to person/place/time [x]Able to follow commands      Eyes:  EOM    [x]  Normal  [] Abnormal-  Sclera  []  Normal  [] Abnormal -         Discharge []  None visible  [] Abnormal -    HENT:   [x] Normocephalic, atraumatic.   [] Abnormal   [] Mouth/Throat: Mucous membranes are moist.     External Ears [x] Normal  [] Abnormal-     Neck: [x] No visualized mass     Pulmonary/Chest: [x] Respiratory effort normal.  [x] No visualized signs of difficulty breathing or respiratory distress        [] Abnormal-      Musculoskeletal:   [] Normal gait with no signs of ataxia         [x] Normal range of motion of neck        [] Abnormal-       Neurological:        [x] No Facial Asymmetry (Cranial nerve 7 motor function) (limited exam to video visit)          [x] No gaze palsy        [] Abnormal-         Skin:        [x] No significant exanthematous lesions or discoloration noted on facial skin         [] Abnormal-            Psychiatric:       [x] Normal Affect [] No Hallucinations        [] Abnormal-     Other pertinent observable physical exam findings-     ASSESSMENT/PLAN:  1. Screen for STD (sexually transmitted disease)  - HIV Screen; Future  - Hepatitis Panel, Acute; Future  - RPR Reflex to Titer and TPPA; Future  - Herpes Simplex Virus (HSV) I/II Antibodies IgG & IgM; Future  - C.trachomatis N.gonorrhoeae DNA, Urine; Future    2. UTI symptoms  - ciprofloxacin (CIPRO) 500 MG tablet; Take 1 tablet by mouth daily for 3 days  Dispense: 3 tablet; Refill: 0  - POCT Urinalysis no Micro; Future  - Culture, Urine    No follow-ups on file. Amber Desai, was evaluated through a synchronous (real-time) audio-video encounter. The patient (or guardian if applicable) is aware that this is a billable service. Verbal consent to proceed has been obtained within the past 12 months. The visit was conducted pursuant to the emergency declaration under the [address] [place] [address] authority and the [employer] General Act. Patient identification was verified, and a caregiver was present when appropriate. The patient was located in a state where the provider was credentialed to provide care. Total time spent on this encounter: Not billed by time    --Venessa Winter DO on 6/9/2021 at 11:52 AM    An electronic signature was used to authenticate this note.

## 2021-06-11 DIAGNOSIS — R39.9 UTI SYMPTOMS: Primary | ICD-10-CM

## 2021-06-15 ENCOUNTER — TELEPHONE (OUTPATIENT)
Dept: FAMILY MEDICINE CLINIC | Age: 41
End: 2021-06-15

## 2021-06-15 DIAGNOSIS — A53.9 SYPHILIS IN MALE: Primary | ICD-10-CM

## 2021-06-15 NOTE — TELEPHONE ENCOUNTER
I have a question for Dr. Darling Mai at infectious disease with [facility]. Please call his office and let them know that patient had a reactive total syphilis IgG/IgM lab but that the RPR confirmatory was nonreactive. The treponema palladium antibodies test was also nonreactive. Patient is not symptomatic. Does HE or does he not have syphilis that needs to be treated? Thank you.

## 2021-06-15 NOTE — TELEPHONE ENCOUNTER
----- Message from Carmina Joseph sent at 6/15/2021  9:43 AM EDT -----  Subject: Results Request    QUESTIONS  Which lab or imaging result is the patient calling about? Hepatits,   HIV,Urine, Syphallis,Treponema pallidum, RPR Confirmatory, C.trachomatis   N.gonorrhoea,Syphilis Antibody Cascadi  Which provider ordered the test? Zoe Ortega   [facility]  Date the test was performed? 2021-06-10  Additional Information for Provider? Patient is requesting the results for   all test completed on this day   ---------------------------------------------------------------------------  --------------  CALL BACK INFO  What is the best way for the office to contact you? OK to leave message on   voicemail  Preferred Call Back Phone Number?  [phone]

## 2021-06-16 ENCOUNTER — TELEPHONE (OUTPATIENT)
Dept: INFECTIOUS DISEASES | Age: 41
End: 2021-06-16

## 2021-06-16 NOTE — TELEPHONE ENCOUNTER
Patient called to schedule appt with Dr. Kavin Cortez. He is out of town for 2 weeks and needs bicillin injections. Per last note from PCP Dr. Kavin Cortez recommended bicillin 2.4 million units weekly x 3 weeks. Advised patient we can get hi in for appt but not urgent consult and next available is July. Explained to patient it is important for him to have treatment and can go to an urgent care and finish last dose with PCP or [facility].

## 2021-06-16 NOTE — TELEPHONE ENCOUNTER
Called & spoke to Dr. Román Hwang personally. He states somewhere along the road the patient should have been Diagnosed or treated for syphilis. Even without symptoms, the patient is at risk for complications in the future. He may have some symptoms like Memory loss or trouble walking. Dr. Román Hwang would treat \"late latent syphilis\" - to prevent further problems  The treatment would be: Benzathine penicillin 2.4 million units IM weekly x 3    This is not an urgent concult - Dr. Román Hwang has opening next month if you want him to treat the patient but states you could do it as well.

## 2021-06-16 NOTE — TELEPHONE ENCOUNTER
Has Dr. Meet Navarro office been contacted? Thank you. 57 year old man with failing renal transplant admitted to start dialysis has pericardial effusion. Today symptomatically improved, specifically not short of breath at rest, no pleuritic pain, blood pressure satisfactory and heart rate normal. No audible rub. Management is successive days dialysis. 57 year old man with failing renal transplant admitted to start dialysis has pericardial effusion. Today symptomatically improved, specifically not short of breath at rest, no pleuritic pain, blood pressure satisfactory and heart rate normal. No audible rub. Management is successive days dialysis. Patient interviewed, examined and chart reviewed.  Case discussed with fellow.  Agree w/ Assessment and Plan as outlined.    Akira Aguirre MD [facility]  P: [phone]  Spectra:  [phone]  Office: [phone] 57 year old man with failing renal transplant admitted to start dialysis has pericardial effusion. On dialysis symptomatically improved and remains not short of breath, no pleuritic pain, blood pressure satisfactory to mildly elevated and heart rate normal. No audible rub. Has had successive days dialysis which has been effective and well tolerated. However, repeat cardiac echo reveals pericardial effusion has not improved, possibly larger with signs of early tamponade.    On hemodynamic grounds there is no urgent reason for pericardiocentesis, however observing this is not behaving as a classic uremic pericardial effusion (no pleuritic pain, no rub and no improvement with frequent dialysis) it is reasonable to consider diagnostic pericardiocentesis. I have seen the patient and reviewed dialysis prescription that has been adjusted for optimized control of volume status, uremia and electrolytes. Management of additional metabolic abnormalities/anemia will continue to be addressed on follow up. Patient interviewed, examined and chart reviewed.  Case discussed with fellow.  Agree w/ Assessment and Plan as outlined.  Mr. Portillo feels very well.  Ambulatory and sx-free.  Just completing HD which he tolerated well w/o incident hypotension. He shows me a case report supplied to him by Dr. Butts of Everolimus induced pericardial effusion  I am uncertain as to the feasibility of drainage given the predominant posterolateral location of the collection.    ADVISE  Repeat ECHO(limited study) 3/26  Akira Aguirre MD [facility]  P: [phone]  Spectra:  [phone]  Office: [phone] Will delay MRI until recovered from cardiac procedure. ok to defer to outpatient setting for transplant clearance. I was present during and reviewed clinical and lab data as well as assessment and plan as documented . Please contact if any additional questions with any change in clinical condition or on availability of any additional information or reports.  In view of pericardial effusion will dialyze daily, no heparin I have seen the patient and reviewed dialysis prescription. Dialysis access - I/j non tunneled catheter. Hemodialysis prescription has been adjusted for optimized control of volume status, uremia and electrolytes. Management of additional metabolic abnormalities/anemia will continue to be addressed on follow up. I have seen the patient and reviewed dialysis prescription that has been adjusted for optimized control of volume status, uremia and electrolytes. Management of additional metabolic abnormalities/anemia will continue to be addressed on follow up.  I was present during and reviewed clinical and lab data as well as assessment and plan as documented . Please contact if any additional questions with any change in clinical condition or on availability of any additional information or reports. I was present during and reviewed clinical and lab data as well as assessment and plan as documented . Please contact if any additional questions with any change in clinical condition or on availability of any additional information or reports. I have seen the patient and reviewed dialysis prescription . Dialysis access noted. Dialysis prescription has been adjusted for optimized control of volume status, uremia and electrolytes. Management of additional metabolic abnormalities/anemia will continue to be addressed on follow up. ESRD on HD- HD today  Failed renal tx- pred/prograf  HTn- UF with HD; monitor BP I have seen the patient and reviewed dialysis prescription and flow sheet. Dialysis access is functioning well. Patient is tolerating dialysis well with no acute symptoms or distress. Dialysis prescription has been adjusted for optimized control of volume status, uremia and electrolytes. Management of additional metabolic abnormalities/anemia will continue to be addressed on follow up.  He is being evaluated for pericardial effusion by CT surgeon and cardiologist. I have seen the patient and reviewed dialysis prescription that has been adjusted for optimized control of volume status, uremia and electrolytes. Management of additional metabolic abnormalities/anemia will continue to be addressed on follow up.  I was present during and reviewed clinical and lab data as well as assessment and plan as documented by the house staff as noted. Please contact if any additional questions with any change in clinical condition or on availability of any additional information or reports. I was present during and reviewed clinical and lab data as well as assessment and plan as documented by the house staff as noted. Please contact if any additional questions with any change in clinical condition or on availability of any additional information or reports.  I have seen the patient and reviewed dialysis prescription that has been adjusted for optimized control of volume status, uremia and electrolytes. Management of additional metabolic abnormalities/anemia will continue to be addressed on follow up. Pt now with failed transplant and likely uremic effusion (vs related to mTOR) s/p pericardial window.   Has been receiving daily HD  Will plan to change dialysis to TIW and arrange outpatient HD.    Increase UF  continue current immunosuppression. Terese Butts MD  Cell   Pager   Office  Pt now with failed transplant and likely uremic effusion (vs related to mTOR)  Has been receiving daily HD  Will plan to change dialysis to TIW  Needs permacath  If effusion still present then may need to continue daily treatment.  Increase UF  Cont current immunosuppression. Will continue frequent hemodialysis with fluid removal.  Diuretics  Epo  Change t tunneled catheter by I/R.  Will follow Renal Transplant recipient, failing allograft with advanced CKD  Anemia  HTN  Started on dialysis with non tunneled catheter  Plan:  1. Start stool softner- Colace 300 mg/day  2. Emollients  3. Taper antihypertensives once blood control improves with fluid removal, will remove 0.5 to 1. Kg today  4. Off everolimus  5.  evaluation h/o nephrectomy ion the past  6. Stress test in prep for transplant

## 2021-06-17 ENCOUNTER — TELEPHONE (OUTPATIENT)
Dept: FAMILY MEDICINE CLINIC | Age: 41
End: 2021-06-17

## 2021-06-17 NOTE — TELEPHONE ENCOUNTER
----- Message from Connor Quinonez sent at 6/17/2021  3:32 PM EDT -----  Subject: Message to Provider    QUESTIONS  Information for Provider? pt states he has an STD, he was referred to   infection doctor and he is out of town for two weeks and cant go there, he   [facility] and they said he could not be treated without an   order. pt is wanting an order but he doesnt know where to send it . Please   call pt to discuss. he is also on kidney list and needs this treated   before he can get a transplant.   ---------------------------------------------------------------------------  --------------  CALL BACK INFO  What is the best way for the office to contact you? OK to leave message on   voicemail  Preferred Call Back Phone Number? [phone]  ---------------------------------------------------------------------------  --------------  SCRIPT ANSWERS  Relationship to Patient?  Self

## 2021-06-18 ENCOUNTER — TELEPHONE (OUTPATIENT)
Dept: FAMILY MEDICINE CLINIC | Age: 41
End: 2021-06-18

## 2021-06-18 NOTE — TELEPHONE ENCOUNTER
----- Message from Brianna Dee sent at 6/18/2021  1:45 PM EDT -----  Subject: Message to Provider    QUESTIONS  Information for Provider? patient waiting on a call back in regards to std   got results suppose to have a transplant at the end of the month and needs   to have the matter taken care please calll as soon as possible   ---------------------------------------------------------------------------  --------------  CALL BACK INFO  What is the best way for the office to contact you? OK to leave message on   voicemail  Preferred Call Back Phone Number? [phone]  ---------------------------------------------------------------------------  --------------  SCRIPT ANSWERS  Relationship to Patient?  Self

## 2021-06-18 NOTE — TELEPHONE ENCOUNTER
[facility] Clinical Staff  Subject: Message to Provider     QUESTIONS   Information for Provider? patient waiting on a call back in regards to std   got results suppose to have a transplant at the end of the month and needs   to have the matter taken care please calll as soon as possible   ---------------------------------------------------------------------------   --------------   CALL BACK INFO   What is the best way for the office to contact you? OK to leave message on   voicemail   Preferred Call Back Phone Number? [phone]   ---------------------------------------------------------------------------   --------------   SCRIPT ANSWERS   Relationship to Patient?  Self

## 2021-06-18 NOTE — TELEPHONE ENCOUNTER
----- Message from Reji Almazan sent at 6/18/2021  3:58 PM EDT -----  Subject: Referral Request    QUESTIONS   Reason for referral request? Pt needs to have Vicillan 2.4 million Units   injections   Has the physician seen you for this condition before? No   Preferred Specialist (if applicable)? Do you already have an appointment scheduled? No  Additional Information for Provider? This injection is need to clear up an   infection so he Pt can proceed with his transplant.   ---------------------------------------------------------------------------  --------------  CALL BACK INFO  What is the best way for the office to contact you? OK to leave message on   voicemail  Preferred Call Back Phone Number?  [phone]

## 2021-06-18 NOTE — TELEPHONE ENCOUNTER
Pt wants Dr Mckinney Likes to call him. He states he needs orders but is unsure what orders he needs. He states he saw infectious disease and can not get his kidney transplant until \"things are taken care of\".  Pt is not able to tell me what he needs and wants to speak with the

## 2021-06-18 NOTE — TELEPHONE ENCOUNTER
----- Message from [facility] sent at 6/18/2021  3:58 PM EDT -----  Subject: Referral Request     QUESTIONS   Reason for referral request? Pt needs to have Vicillan 2.4 million Units   injections   Has the physician seen you for this condition before? No   Preferred Specialist (if applicable)? Do you already have an appointment scheduled? No  Additional Information for Provider? This injection is need to clear up an   infection so he Pt can proceed with his transplant.   ---------------------------------------------------------------------------  --------------  CALL BACK INFO  What is the best way for the office to contact you? OK to leave message on   voicemail  Preferred Call Back Phone Number?  [phone]

## 2021-06-19 ENCOUNTER — HOSPITAL ENCOUNTER (EMERGENCY)
Age: 41
Discharge: HOME | End: 2021-06-19
Payer: COMMERCIAL

## 2021-06-19 VITALS
OXYGEN SATURATION: 97 % | BODY MASS INDEX: 30.8 KG/M2 | TEMPERATURE: 98.2 F | DIASTOLIC BLOOD PRESSURE: 92 MMHG | WEIGHT: 220 LBS | HEIGHT: 71 IN | HEART RATE: 82 BPM | SYSTOLIC BLOOD PRESSURE: 154 MMHG | RESPIRATION RATE: 18 BRPM

## 2021-06-19 DIAGNOSIS — A53.0 LATENT SYPHILIS: Primary | ICD-10-CM

## 2021-06-19 PROCEDURE — 6360000002 HC RX W HCPCS: Performed by: PHYSICIAN ASSISTANT

## 2021-06-19 PROCEDURE — 96372 THER/PROPH/DIAG INJ SC/IM: CPT

## 2021-06-19 PROCEDURE — 99283 EMERGENCY DEPT VISIT LOW MDM: CPT

## 2021-06-19 RX ADMIN — PENICILLIN G BENZATHINE AND PENICILLIN G PROCAINE 2.4 MILLION UNITS: 600000; 600000 INJECTION, SUSPENSION INTRAMUSCULAR at 16:20

## 2021-06-19 ASSESSMENT — PAIN DESCRIPTION - PAIN TYPE: TYPE: CHRONIC PAIN

## 2021-06-19 ASSESSMENT — PAIN DESCRIPTION - LOCATION: LOCATION: FLANK

## 2021-06-19 ASSESSMENT — PAIN SCALES - GENERAL: PAINLEVEL_OUTOF10: 2

## 2021-06-19 ASSESSMENT — PAIN DESCRIPTION - ORIENTATION: ORIENTATION: RIGHT;LEFT

## 2021-06-19 NOTE — ED PROVIDER NOTES
distress. HENT: Normocephalic. Atraumatic. External ears normal, without discharge. Nose normal. Mucous membranes moist.  EYES: Conjunctiva non-injected. No scleral icterus. PERRL. EOM's grossly intact. NECK: Supple. Normal ROM. CARDIOVASCULAR: Normal heart rate. Intact distal pulses. PULMONARY/CHEST WALL: Breathing is unlabored. Equal, symmetric chest rise. Speaking comfortably in full sentences. ABDOMEN: Nondistended  MUSKULOSKELETAL: Normal ROM. No acute deformities. SKIN: Warm and dry. NEUROLOGICAL: Alert and oriented x 3. Strength is 5/5 in all extremities and sensation is intact. PSYCHIATRIC: Normal affect    Labs:    None    RADIOLOGY:    None      ED COURSE/MDM:  Patient was given the following medications:  Medications   penicillin G benzathine and procaine (BICILLIN C-R) [id number] UNIT per 2ML suspension 2.4 Million Units (has no administration in time range)       I have evaluated this patient here in the ED. Patient had outpatient evaluation for STD and reports ultimately testing positive for syphilis. He is here in the ED seeking treatment. Based on his lack of symptoms and the presumed fact that he needs to be treated for latent syphilis he will be given a IM injection of penicillin G 2,400,000 units in the ED. He will need 2 more shots for total of 3 IM injections of penicillin G for complete treatment. He is made aware of this. In addition to follow-up with primary care he is given our infectious disease physician to follow-up with. CLINICAL IMPRESSION:  1. Latent syphilis        Blood pressure (!) 154/92, pulse 82, temperature 98.2 °F (36.8 °C), temperature source Oral, resp. rate 18, height 5' 11\" (1.803 m), weight 220 lb (99.8 kg), SpO2 97 %.           PATIENT REFERRED TO:  Sisi Irene, DO  [address] [facility]  [address] [facility] [address] [address] [facility] [address], [address]  [facility], [facility]  [address]  [phone]    Schedule an appointment as soon as possible for a visit           DISPOSITION  Patient was discharged to home in good condition.           Nikki Sloan  06/19/21 7194

## 2021-06-24 NOTE — TELEPHONE ENCOUNTER
Please follow-up with patient regarding this as he needs to see infectious disease any additional injections he may may need, as we do not have these in the office.   Thank

## 2021-06-26 ENCOUNTER — HOSPITAL ENCOUNTER (EMERGENCY)
Age: 41
Discharge: HOME | End: 2021-06-26
Payer: COMMERCIAL

## 2021-06-26 VITALS
RESPIRATION RATE: 16 BRPM | HEIGHT: 72 IN | WEIGHT: 220 LBS | HEART RATE: 74 BPM | DIASTOLIC BLOOD PRESSURE: 98 MMHG | TEMPERATURE: 97.7 F | OXYGEN SATURATION: 98 % | SYSTOLIC BLOOD PRESSURE: 144 MMHG | BODY MASS INDEX: 29.8 KG/M2

## 2021-06-26 DIAGNOSIS — A53.0 LATENT SYPHILIS IN MALE: Primary | ICD-10-CM

## 2021-06-26 PROCEDURE — 99283 EMERGENCY DEPT VISIT LOW MDM: CPT

## 2021-06-26 PROCEDURE — 96372 THER/PROPH/DIAG INJ SC/IM: CPT

## 2021-06-26 PROCEDURE — 6360000002 HC RX W HCPCS: Performed by: NURSE PRACTITIONER

## 2021-06-26 RX ADMIN — PENICILLIN G BENZATHINE 2.4 MILLION UNITS: 600000 INJECTION, SUSPENSION INTRAMUSCULAR at 11:27

## 2021-06-26 NOTE — ED PROVIDER NOTES
[address] [place] [address]  ED  EMERGENCY DEPARTMENT ENCOUNTER      This patient was not seen and evaluated by the attending physician. Pt Name: Madelaine Armendariz  MRN: [id number]  [place] 1980  Date of evaluation: 6/26/2021  Provider: ANA Esteban - CNP-C  PCP: Heyid Lopes DO      History provided by the patient. CHIEFCOMPLAINT:     Chief Complaint   Patient presents with    Exposure to STD     pt states\" I was here last week and got a shot of pcn for an std and now I'm here for my second shot. \"       HISTORY OF PRESENT ILLNESS:      Madelaine Armendariz is a 45 y.o. male who presents to [address] [place] [address]  ED with complaints of needing a shot of penicillin. Patient was previously diagnosed with latent syphilis, to previous ER visit he came to receive his shot of penicillin, this is the patient's second shot of penicillin he will need to return for another dose. Patient with no symptoms no lesions no urinary complaints, he has no acute medical complaints at this time. LOCATION:-  QUALITY:-  SEVERITY:-  DURATION:-  MODIFYING FACTORS:-    Nursing Notes were reviewed     REVIEW OF SYSTEMS:     Review of Systems  All systems, a total of 10, are reviewed and negative except for those that were just noted in history present illness.         PAST MEDICAL HISTORY:     Past Medical History:   Diagnosis Date    ADD (attention deficit disorder with hyperactivity)     Hyperlipidemia     Hypertension     PKD (polycystic kidney disease)     Renal disease     polycytic         SURGICAL HISTORY:      Past Surgical History:   Procedure Laterality Date    HERNIA REPAIR      OTHER SURGICAL HISTORY Bilateral 5/15/13    BILATERAL LAPAROSCOPIC PREPERITONEAL INGUINAL HERNIA REPAIR    UMBILICAL HERNIA REPAIR N/A 7/64/1354    OPEN UMBILICAL HERNIA REPAIR performed by Deb Kuhn MD at [place] [address]  3/15/2011    VASECTOMY      [address] [facility] Determinants of Health     Financial Resource Strain: Low Risk     Difficulty of Paying Living Expenses: Not hard at all   Food Insecurity: No Food Insecurity    Worried About Running Out of Food in the Last Year: Never true    Naomi of Food in the Last Year: Never true   Transportation Needs: No Transportation Needs    Lack of Transportation (Medical): No    Lack of Transportation (Non-Medical): No   Physical Activity:     Days of Exercise per Week:     Minutes of Exercise per Session:    Stress:     Feeling of Stress :    Social Connections:     Frequency of Communication with Friends and Family:     Frequency of Social Gatherings with Friends and Family:     Attends Restoration Services:     Active Member of Clubs or Organizations:     Attends Club or Organization Meetings:     Marital Status:    Intimate Partner Violence:     Fear of Current or Ex-Partner:     Emotionally Abused:     Physically Abused:     Sexually Abused:        SCREENINGS:             PHYSICAL EXAM:       ED Triage Vitals [06/26/21 1044]   BP Temp Temp Source Pulse Resp SpO2 Height Weight   (!) 144/98 (!) 0.9 °F (-17.3 °C) Oral 74 16 98 % 6' (1.829 m) 220 lb (99.8 kg)       Physical Exam    CONSTITUTIONAL: Awake and alert. Cooperative. Well-developed. Well-nourished. Vitals:    06/26/21 1044 06/26/21 1101   BP: (!) 144/98    Pulse: 74    Resp: 16    Temp: (!) 0.9 °F (-17.3 °C) 97.7 °F (36.5 °C)   TempSrc: Oral Oral   SpO2: 98%    Weight: 220 lb (99.8 kg)    Height: 6' (1.829 m)      HENT: Normocephalic. Atraumatic. External ears normal, without discharge. Nonasal discharge. Mucous membranes moist.  EYES: Conjunctiva non-injected, no lid abnormalities noted. No scleral icterus. EOM's grossly intact. Anterior chambers clear. NECK: Supple. Normal ROM. No meningismus. No thyroid tenderness or swelling noted. CARDIOVASCULAR: no tachycardia per vital signs. PULMONARY/CHEST WALL: Effort normal. No tachypnea.  No audible adventitious breath sounds. ABDOMEN: No obvious abdominal distention, no obvious hernias. Back: Spine is midline. No obvious trauma or outward signs of cauda equina  /ANORECTAL: Not assessed  MUSKULOSKELETAL: Normal ROM. No acute deformities. No edema. SKIN: Warm and dry. NEUROLOGICAL:  GCS 15. No obvious focal neurological deficits. PSYCHIATRIC: Normal affect, normal insight and judgement. Alert and oriented x 3. DIAGNOSTIC RESULTS:     LABS:    No results found for this visit on 06/26/21. RADIOLOGY:  All x-ray studies are viewed/reviewed by me. Formal interpretations per the radiologist are as follows: No orders to display           EKG:  See EKG interpretation by an attending physician. PROCEDURES:   N/A    CRITICAL CARE TIME:   N/A    CONSULTS:  None      EMERGENCY DEPARTMENT COURSE andDIFFERENTIAL DIAGNOSIS/MDM:   Vitals:    Vitals:    06/26/21 1044 06/26/21 1101   BP: (!) 144/98    Pulse: 74    Resp: 16    Temp: (!) 0.9 °F (-17.3 °C) 97.7 °F (36.5 °C)   TempSrc: Oral Oral   SpO2: 98%    Weight: 220 lb (99.8 kg)    Height: 6' (1.829 m)        Patient wasgiven the following medications:  Medications   penicillin G benzathine (BICILLIN L-A) injection 2.4 Million Units (2.4 Million Units Intramuscular Given 6/26/21 1127)         Patient was evaluated independently by myself with the attending physician available for consultation. Patient presented to the emergency room today with necessity for shot of penicillin G. Patient previously diagnosed with latent syphilis, he is simply here for second dose of antibiotic. No acute complaints, patient given a dose of penicillin G. He will need to return next week for the same treatment. Patient verbalized understanding this plan, discharged home in good condition. Patient laboratory studies, radiographic imaging, and assessment were all discussed with the patient and/orpatient family.   There was shared decision-making between myself as well as the patient and/or their surrogate and we are all in agreement with discharge home. There was an opportunity for questions and all questions were answered tothe best of my ability and to the satisfaction of the patient and/or patient family. FINAL IMPRESSION:      1.  Latent syphilis in male          DISPOSITION/PLAN:   DISPOSITION Decision To Discharge      PATIENT REFERRED TO:  Morena Brooks DO  [address] [facility]  [address] [facility]  [address] [facility] [address] [place] [address]    Call   For follow up    [facility]  ED  [facility] [address]  [phone]  Go in 1 week  for another IM injection      DISCHARGE MEDICATIONS:  Discharge Medication List as of 6/26/2021 11:31 AM                     (Please note thatportions of this note were completed with a voice recognition program.  Efforts were made to edit the dictations, but occasionally words are mis-transcribed.)    ANA Joyner - CNP-C (electronicallysigned)      ANA Joyner CNP  06/26/21 8805

## 2021-06-30 RX ORDER — OMEPRAZOLE 40 MG/1
40 CAPSULE, DELAYED RELEASE ORAL
Qty: 30 CAPSULE | Refills: 2 | Status: SHIPPED | OUTPATIENT
Start: 2021-06-30 | End: 2021-07-22

## 2021-06-30 NOTE — TELEPHONE ENCOUNTER
Please let pt know that the Oletta Forte is not covered and that I have replaced with Omeprazole 40 mg. Thank you.

## 2021-06-30 NOTE — TELEPHONE ENCOUNTER
I left detailed message on recorder about medication not covered and new med has been sent to pharmacy

## 2021-07-03 ENCOUNTER — HOSPITAL ENCOUNTER (EMERGENCY)
Age: 41
Discharge: HOME | End: 2021-07-03
Payer: COMMERCIAL

## 2021-07-03 VITALS
SYSTOLIC BLOOD PRESSURE: 141 MMHG | WEIGHT: 220 LBS | OXYGEN SATURATION: 96 % | HEIGHT: 72 IN | BODY MASS INDEX: 29.8 KG/M2 | DIASTOLIC BLOOD PRESSURE: 107 MMHG | RESPIRATION RATE: 16 BRPM | HEART RATE: 80 BPM | TEMPERATURE: 98.5 F

## 2021-07-03 DIAGNOSIS — A53.0 LATENT SYPHILIS IN MALE: Primary | ICD-10-CM

## 2021-07-03 PROCEDURE — 96372 THER/PROPH/DIAG INJ SC/IM: CPT

## 2021-07-03 PROCEDURE — 99283 EMERGENCY DEPT VISIT LOW MDM: CPT

## 2021-07-03 PROCEDURE — 6360000002 HC RX W HCPCS: Performed by: NURSE PRACTITIONER

## 2021-07-03 RX ADMIN — PENICILLIN G BENZATHINE 2.4 MILLION UNITS: 2400000 INJECTION, SUSPENSION INTRAMUSCULAR at 10:14

## 2021-07-03 NOTE — ED PROVIDER NOTES
Evaluated by [id number] [facility] Provider    [address] [facility]  ED    CHIEF COMPLAINT  IV Medication (PT here to get 3rd pcn shot)    HISTORY OF PRESENT ILLNESS  Alma Cuadra is a 45 y.o. male who presents to the ED with known latent syphilis and in need of IM PCN. He was recently diagnosed with latent syphilis and is currently in the process of getting a kidney transplant. He needs a total of 3 PCN shots and is here today to get his 3rd shot. No other complaints at this time. The patient arrived to the ED via private car. Nursing notes reviewed.    Past Medical History:   Diagnosis Date    ADD (attention deficit disorder with hyperactivity)     Hyperlipidemia     Hypertension     PKD (polycystic kidney disease)     Renal disease     polycytic     Past Surgical History:   Procedure Laterality Date    HERNIA REPAIR      OTHER SURGICAL HISTORY Bilateral 5/15/13    BILATERAL LAPAROSCOPIC PREPERITONEAL INGUINAL HERNIA REPAIR    UMBILICAL HERNIA REPAIR N/A 0/14/1891    OPEN UMBILICAL HERNIA REPAIR performed by [facility]anne Marie [facility] at [facility]  3/15/2011    VASECTOMY      WISDOM TOOTH EXTRACTION       Family History   Problem Relation Age of Onset    Kidney Disease Mother     Heart Disease Mother         CHF    High Blood Pressure Sister     High Cholesterol Sister     Other Sister         PKD    High Blood Pressure Brother     High Cholesterol Brother     Other Brother         PKD     Social History     Socioeconomic History    Marital status:      Spouse name: Not on file    Number of children: Not on file    Years of education: Not on file    Highest education level: Not on file   Occupational History    Not on file   Tobacco Use    Smoking status: Current Some Day Smoker     Packs/day: 0.25     Years: 26.00     Pack years: 6.50     Types: Cigarettes    Smokeless tobacco: Never Used    Tobacco comment: 1 pack per month Vaping Use    Vaping Use: Never used   Substance and Sexual Activity    Alcohol use: Yes     Comment: rarely    Drug use: Not Currently    Sexual activity: Yes     Partners: Female   Other Topics Concern    Not on file   Social History Narrative    Not on file     Social Determinants of Health     Financial Resource Strain: Low Risk     Difficulty of Paying Living Expenses: Not hard at all   Food Insecurity: No Food Insecurity    Worried About [address] in the Last Year: Never true    [address] [facility] [address] in the Last Year: Never true   Transportation Needs: No Transportation Needs    Lack of Transportation (Medical): No    Lack of Transportation (Non-Medical): No   Physical Activity:     Days of Exercise per Week:     Minutes of Exercise per Session:    Stress:     Feeling of Stress :    Social Connections:     Frequency of Communication with Friends and Family:     Frequency of Social Gatherings with Friends and Family:     Attends Mosque Services:     Active Member of Clubs or Organizations:     Attends Club or Organization Meetings:     Marital Status:    Intimate Partner Violence:     Fear of Current or Ex-Partner:     Emotionally Abused:     Physically Abused:     Sexually Abused:      No current facility-administered medications for this encounter. Current Outpatient Medications   Medication Sig Dispense Refill    omeprazole (PRILOSEC) 40 MG delayed release capsule Take 1 capsule by mouth every morning (before breakfast) 30 capsule 2    cloNIDine (CATAPRES) 0.3 MG/24HR PTWK PLACE 1 PATCH ONTO THE SKIN EVERY 7 DAYS.       simvastatin (ZOCOR) 40 MG tablet Take 1 tablet by mouth nightly 90 tablet 1    allopurinol (ZYLOPRIM) 100 MG tablet TAKE 1 TABLET BY MOUTH EVERY DAY 90 tablet 1    amLODIPine (NORVASC) 10 MG tablet TAKE 1 TABLET BY MOUTH EVERY DAY 30 tablet 3    citalopram (CELEXA) 10 MG tablet TAKE 1 TABLET BY MOUTH EVERY DAY 90 tablet 1     Allergies   Allergen Reactions    Bee Pollen     Hydrocodone Itching    Iodine Swelling    Nsaids      Does not take due to kidney function issues    Peanut-Containing Drug Products      [place] tree dust    [employer]        REVIEW OF SYSTEMS  10 systems reviewed, pertinent positives per HPI otherwise noted to be negative    PHYSICAL EXAM  BP (!) 141/107   Pulse 80   Temp 98.5 °F (36.9 °C) (Oral)   Resp 16   Ht 6' (1.829 m)   Wt 220 lb (99.8 kg)   SpO2 96%   BMI 29.84 kg/m²     GENERAL APPEARANCE: Well developed, well nourished. Awake and alert. Cooperative. Observed resting in bed. No acute distress. HEAD: Normocephalic. Atraumatic. EYES: Sclera is non-icteric. Conjunctiva normal.  ENT: External ears are normal. Mucous membranes are moist.   NECK: Supple. Normal ROM. Trachea mid-line. Cardiac: Regular rate and rhythm per vital signs. LUNGS: Breathing is unlabored. Speaking comfortably in full sentences. Equal and symmetric chest rise. Abdomen: Non-distended. Musculoskeletal: Normal ROM. No gross deformities or trauma noted. Moving all extremities equally and appropriately. NEUROLOGICAL: Alert and oriented x3. Strength is normal. No focal motor or sensory deficits. SKIN: Warm and dry. Skin is with good color. Skin is intact. Psychiatric: Mood and affect appropriate. Speech is clear and articulate. PROCEDURES  None    RADIOLOGY  None      LABS  No results found for this visit on 07/03/21. ED COURSE/MDM  Patient seen and evaluated. Old records reviewed. I have evaluated this patient. My supervising physician was available for consultation. Corey Members presented to the ED today with above noted complaints. Patient presenting for need of a third penicillin G IM shot. He is here for this shot and does not have complaints at this time. Patient has follow-up with infectious disease later this month.   As this is his last shot he has completed the series and I encouraged him to keep his appointment with infectious disease later this month as scheduled. While in ED patient received   Medications   Penicillin G Benzathine (BICILLIN L-A) [id number] UNIT/4ML suspension 2.4 Million Units (2.4 Million Units Intramuscular Given 7/3/21 1014)       At this point I do not feel the patient requires further work up and it is reasonable to discharge the patient. Please refer to AVS for further details regarding discharge instructions. A discussion was had with the patient regarding diagnosis, treatment/ plan of care, and follow up. All questions were answered. Patient will follow up as directed for further evaluation/treatment. The patient was given strict return precautions as we discussed symptoms that would necessitate return to the ED. Patient will return to ED for new/worsening symptoms. The patient verbalized their understanding and agreement with the above plan. I estimate there is LOW risk for PROSTATITIS, PYELONEPHRITIS, URETHRITIS or EPIDIDYMITIS, thus I consider the discharge disposition reasonable. Also, there is no evidence or peritonitis, sepsis, or toxicity. Mandie Rodríguez  and I have discussed the diagnosis and risks, and we agree with discharging home to follow-up with their primary doctor. We also discussed returning to the Emergency Department immediately if new or worsening symptoms occur. We have discussed the symptoms which are most concerning (e.g., fever, changing or worsening pain, vomiting) that necessitate immediate return. Final diagnoses:   Latent syphilis in male     Patient was sent home with a prescription for below medication/s. I did Qagan Tayagungin patient on appropriate use of these medication.   New Prescriptions    No medications on file       Abner 48, DO  [address]    Call   As needed    Infectious Disease  As scheduled  Go to   For further evaluation      DISPOSITION  Patient was discharged to home in good condition. Comment: Please note this report has been produced using speech recognition software and may contain errors related to that system including errors in grammar, punctuation, and spelling, as well as words and phrases that may be inappropriate. If there are any questions or concerns please feel free to contact the dictating provider for clarification.         Denver Pu, APRN - CNP  07/03/21 ANA Singh - ANNE  07/08/21 9307

## 2021-10-27 RX ORDER — SIMVASTATIN 40 MG
TABLET ORAL
Qty: 90 TABLET | Refills: 1 | Status: SHIPPED | OUTPATIENT
Start: 2021-10-27

## 2021-11-10 RX ORDER — OMEPRAZOLE 40 MG/1
CAPSULE, DELAYED RELEASE ORAL
Qty: 90 CAPSULE | Refills: 0 | Status: SHIPPED | OUTPATIENT
Start: 2021-11-10 | End: 2022-02-07 | Stop reason: SDUPTHER

## 2021-11-12 DIAGNOSIS — R45.89 DEPRESSED MOOD: ICD-10-CM

## 2021-11-12 DIAGNOSIS — F41.9 ANXIETY: ICD-10-CM

## 2021-11-12 RX ORDER — CITALOPRAM 10 MG/1
TABLET ORAL
Qty: 90 TABLET | Refills: 1 | Status: SHIPPED | OUTPATIENT
Start: 2021-11-12

## 2022-02-07 RX ORDER — OMEPRAZOLE 40 MG/1
CAPSULE, DELAYED RELEASE ORAL
Qty: 90 CAPSULE | Refills: 1 | Status: SHIPPED | OUTPATIENT
Start: 2022-02-07 | End: 2022-08-05

## 2022-02-18 ENCOUNTER — APPOINTMENT (OUTPATIENT)
Dept: CT IMAGING | Age: 42
DRG: 687 | End: 2022-02-18
Payer: COMMERCIAL

## 2022-02-18 ENCOUNTER — HOSPITAL ENCOUNTER (INPATIENT)
Age: 42
LOS: 1 days | Discharge: HOME | DRG: 687 | End: 2022-02-19
Attending: EMERGENCY MEDICINE | Admitting: INTERNAL MEDICINE
Payer: COMMERCIAL

## 2022-02-18 DIAGNOSIS — I10 UNCONTROLLED HYPERTENSION: ICD-10-CM

## 2022-02-18 DIAGNOSIS — R16.0 HYPODENSE MASS OF LIVER: ICD-10-CM

## 2022-02-18 DIAGNOSIS — N28.89 RENAL MASS: ICD-10-CM

## 2022-02-18 DIAGNOSIS — N17.9 ACUTE RENAL FAILURE, UNSPECIFIED ACUTE RENAL FAILURE TYPE (HCC): Primary | ICD-10-CM

## 2022-02-18 PROBLEM — R51.9 HEADACHE: Status: ACTIVE | Noted: 2022-02-18

## 2022-02-18 PROBLEM — N18.9 CHRONIC KIDNEY DISEASE: Status: ACTIVE | Noted: 2022-02-18

## 2022-02-18 PROBLEM — R10.9 ABDOMINAL PAIN: Status: ACTIVE | Noted: 2022-02-18

## 2022-02-18 LAB
A/G RATIO: 1.6 (ref 1.1–2.2)
ALBUMIN SERPL-MCNC: 4.7 G/DL (ref 3.4–5)
ALP BLD-CCNC: 78 U/L (ref 40–129)
ALT SERPL-CCNC: 21 U/L (ref 10–40)
ANION GAP SERPL CALCULATED.3IONS-SCNC: 13 MMOL/L (ref 3–16)
AST SERPL-CCNC: 18 U/L (ref 15–37)
BACTERIA: ABNORMAL /HPF
BASOPHILS ABSOLUTE: 0.1 K/UL (ref 0–0.2)
BASOPHILS RELATIVE PERCENT: 0.9 %
BILIRUB SERPL-MCNC: <0.2 MG/DL (ref 0–1)
BILIRUBIN URINE: NEGATIVE
BLOOD, URINE: ABNORMAL
BUN BLDV-MCNC: 51 MG/DL (ref 7–20)
CALCIUM SERPL-MCNC: 9.3 MG/DL (ref 8.3–10.6)
CHLORIDE BLD-SCNC: 101 MMOL/L (ref 99–110)
CLARITY: CLEAR
CO2: 22 MMOL/L (ref 21–32)
COLOR: ABNORMAL
CREAT SERPL-MCNC: 4.7 MG/DL (ref 0.9–1.3)
EOSINOPHILS ABSOLUTE: 0.3 K/UL (ref 0–0.6)
EOSINOPHILS RELATIVE PERCENT: 4.6 %
GFR AFRICAN AMERICAN: 17
GFR NON-AFRICAN AMERICAN: 14
GLUCOSE BLD-MCNC: 97 MG/DL (ref 70–99)
GLUCOSE URINE: NEGATIVE MG/DL
HCT VFR BLD CALC: 38.6 % (ref 40.5–52.5)
HEMOGLOBIN: 13.2 G/DL (ref 13.5–17.5)
INR BLD: 0.94 (ref 0.88–1.12)
KETONES, URINE: NEGATIVE MG/DL
LEUKOCYTE ESTERASE, URINE: NEGATIVE
LYMPHOCYTES ABSOLUTE: 2.5 K/UL (ref 1–5.1)
LYMPHOCYTES RELATIVE PERCENT: 33.8 %
MCH RBC QN AUTO: 28 PG (ref 26–34)
MCHC RBC AUTO-ENTMCNC: 34.3 G/DL (ref 31–36)
MCV RBC AUTO: 81.6 FL (ref 80–100)
MICROSCOPIC EXAMINATION: YES
MONOCYTES ABSOLUTE: 0.7 K/UL (ref 0–1.3)
MONOCYTES RELATIVE PERCENT: 9.8 %
NEUTROPHILS ABSOLUTE: 3.8 K/UL (ref 1.7–7.7)
NEUTROPHILS RELATIVE PERCENT: 50.9 %
NITRITE, URINE: NEGATIVE
PDW BLD-RTO: 15.5 % (ref 12.4–15.4)
PH UA: 6 (ref 5–8)
PLATELET # BLD: 272 K/UL (ref 135–450)
PMV BLD AUTO: 7.1 FL (ref 5–10.5)
POTASSIUM REFLEX MAGNESIUM: 4.5 MMOL/L (ref 3.5–5.1)
PROTEIN UA: 100 MG/DL
PROTHROMBIN TIME: 10.6 SEC (ref 9.9–12.7)
RBC # BLD: 4.74 M/UL (ref 4.2–5.9)
RBC UA: ABNORMAL /HPF (ref 0–4)
SODIUM BLD-SCNC: 136 MMOL/L (ref 136–145)
SPECIFIC GRAVITY UA: 1.02 (ref 1–1.03)
TOTAL PROTEIN: 7.6 G/DL (ref 6.4–8.2)
TROPONIN: <0.01 NG/ML
URINE TYPE: ABNORMAL
UROBILINOGEN, URINE: 0.2 E.U./DL
WBC # BLD: 7.4 K/UL (ref 4–11)
WBC UA: ABNORMAL /HPF (ref 0–5)
YEAST: PRESENT /HPF

## 2022-02-18 PROCEDURE — 84484 ASSAY OF TROPONIN QUANT: CPT

## 2022-02-18 PROCEDURE — 99284 EMERGENCY DEPT VISIT MOD MDM: CPT

## 2022-02-18 PROCEDURE — 74176 CT ABD & PELVIS W/O CONTRAST: CPT

## 2022-02-18 PROCEDURE — 80053 COMPREHEN METABOLIC PANEL: CPT

## 2022-02-18 PROCEDURE — 96375 TX/PRO/DX INJ NEW DRUG ADDON: CPT

## 2022-02-18 PROCEDURE — 6370000000 HC RX 637 (ALT 250 FOR IP): Performed by: PHYSICIAN ASSISTANT

## 2022-02-18 PROCEDURE — 81001 URINALYSIS AUTO W/SCOPE: CPT

## 2022-02-18 PROCEDURE — 6360000002 HC RX W HCPCS: Performed by: NURSE PRACTITIONER

## 2022-02-18 PROCEDURE — 87086 URINE CULTURE/COLONY COUNT: CPT

## 2022-02-18 PROCEDURE — 85025 COMPLETE CBC W/AUTO DIFF WBC: CPT

## 2022-02-18 PROCEDURE — 6360000002 HC RX W HCPCS: Performed by: PHYSICIAN ASSISTANT

## 2022-02-18 PROCEDURE — 70450 CT HEAD/BRAIN W/O DYE: CPT

## 2022-02-18 PROCEDURE — 93005 ELECTROCARDIOGRAM TRACING: CPT | Performed by: EMERGENCY MEDICINE

## 2022-02-18 PROCEDURE — 1200000000 HC SEMI PRIVATE

## 2022-02-18 PROCEDURE — 6360000002 HC RX W HCPCS: Performed by: EMERGENCY MEDICINE

## 2022-02-18 PROCEDURE — 96374 THER/PROPH/DIAG INJ IV PUSH: CPT

## 2022-02-18 PROCEDURE — 85610 PROTHROMBIN TIME: CPT

## 2022-02-18 RX ORDER — CLONIDINE HYDROCHLORIDE 0.1 MG/1
0.1 TABLET ORAL ONCE
Status: COMPLETED | OUTPATIENT
Start: 2022-02-18 | End: 2022-02-18

## 2022-02-18 RX ORDER — LABETALOL HYDROCHLORIDE 5 MG/ML
10 INJECTION, SOLUTION INTRAVENOUS EVERY 6 HOURS PRN
Status: DISCONTINUED | OUTPATIENT
Start: 2022-02-18 | End: 2022-02-19 | Stop reason: HOSPADM

## 2022-02-18 RX ORDER — ACETAMINOPHEN 650 MG/1
650 SUPPOSITORY RECTAL EVERY 6 HOURS PRN
Status: DISCONTINUED | OUTPATIENT
Start: 2022-02-18 | End: 2022-02-19 | Stop reason: HOSPADM

## 2022-02-18 RX ORDER — ALLOPURINOL 100 MG/1
100 TABLET ORAL DAILY
Status: DISCONTINUED | OUTPATIENT
Start: 2022-02-19 | End: 2022-02-19 | Stop reason: HOSPADM

## 2022-02-18 RX ORDER — ATORVASTATIN CALCIUM 40 MG/1
40 TABLET, FILM COATED ORAL DAILY
Status: DISCONTINUED | OUTPATIENT
Start: 2022-02-19 | End: 2022-02-19 | Stop reason: HOSPADM

## 2022-02-18 RX ORDER — AMLODIPINE BESYLATE 5 MG/1
10 TABLET ORAL DAILY
Status: DISCONTINUED | OUTPATIENT
Start: 2022-02-19 | End: 2022-02-19

## 2022-02-18 RX ORDER — POLYETHYLENE GLYCOL 3350 17 G/17G
17 POWDER, FOR SOLUTION ORAL DAILY PRN
Status: DISCONTINUED | OUTPATIENT
Start: 2022-02-18 | End: 2022-02-19 | Stop reason: HOSPADM

## 2022-02-18 RX ORDER — SODIUM CHLORIDE 0.9 % (FLUSH) 0.9 %
5-40 SYRINGE (ML) INJECTION EVERY 12 HOURS SCHEDULED
Status: DISCONTINUED | OUTPATIENT
Start: 2022-02-18 | End: 2022-02-19 | Stop reason: HOSPADM

## 2022-02-18 RX ORDER — FENTANYL CITRATE 50 UG/ML
50 INJECTION, SOLUTION INTRAMUSCULAR; INTRAVENOUS
Status: DISCONTINUED | OUTPATIENT
Start: 2022-02-18 | End: 2022-02-19 | Stop reason: HOSPADM

## 2022-02-18 RX ORDER — SODIUM CHLORIDE 0.9 % (FLUSH) 0.9 %
5-40 SYRINGE (ML) INJECTION PRN
Status: DISCONTINUED | OUTPATIENT
Start: 2022-02-18 | End: 2022-02-19 | Stop reason: HOSPADM

## 2022-02-18 RX ORDER — SODIUM CHLORIDE 9 MG/ML
25 INJECTION, SOLUTION INTRAVENOUS PRN
Status: DISCONTINUED | OUTPATIENT
Start: 2022-02-18 | End: 2022-02-19 | Stop reason: HOSPADM

## 2022-02-18 RX ORDER — ONDANSETRON 2 MG/ML
4 INJECTION INTRAMUSCULAR; INTRAVENOUS EVERY 6 HOURS PRN
Status: DISCONTINUED | OUTPATIENT
Start: 2022-02-18 | End: 2022-02-19 | Stop reason: HOSPADM

## 2022-02-18 RX ORDER — CITALOPRAM 20 MG/1
10 TABLET ORAL DAILY
Status: DISCONTINUED | OUTPATIENT
Start: 2022-02-19 | End: 2022-02-19 | Stop reason: HOSPADM

## 2022-02-18 RX ORDER — PANTOPRAZOLE SODIUM 40 MG/1
40 TABLET, DELAYED RELEASE ORAL
Status: DISCONTINUED | OUTPATIENT
Start: 2022-02-19 | End: 2022-02-19 | Stop reason: HOSPADM

## 2022-02-18 RX ORDER — MORPHINE SULFATE 4 MG/ML
4 INJECTION, SOLUTION INTRAMUSCULAR; INTRAVENOUS ONCE
Status: COMPLETED | OUTPATIENT
Start: 2022-02-18 | End: 2022-02-18

## 2022-02-18 RX ORDER — CLONIDINE HYDROCHLORIDE 0.1 MG/1
0.2 TABLET ORAL 2 TIMES DAILY
Status: DISCONTINUED | OUTPATIENT
Start: 2022-02-19 | End: 2022-02-19

## 2022-02-18 RX ORDER — DIPHENHYDRAMINE HYDROCHLORIDE 50 MG/ML
12.5 INJECTION INTRAMUSCULAR; INTRAVENOUS ONCE
Status: COMPLETED | OUTPATIENT
Start: 2022-02-18 | End: 2022-02-18

## 2022-02-18 RX ORDER — ACETAMINOPHEN 325 MG/1
650 TABLET ORAL EVERY 6 HOURS PRN
Status: DISCONTINUED | OUTPATIENT
Start: 2022-02-18 | End: 2022-02-19 | Stop reason: HOSPADM

## 2022-02-18 RX ORDER — METOCLOPRAMIDE HYDROCHLORIDE 5 MG/ML
10 INJECTION INTRAMUSCULAR; INTRAVENOUS ONCE
Status: COMPLETED | OUTPATIENT
Start: 2022-02-18 | End: 2022-02-18

## 2022-02-18 RX ORDER — ONDANSETRON 4 MG/1
4 TABLET, ORALLY DISINTEGRATING ORAL EVERY 8 HOURS PRN
Status: DISCONTINUED | OUTPATIENT
Start: 2022-02-18 | End: 2022-02-19 | Stop reason: HOSPADM

## 2022-02-18 RX ORDER — HEPARIN SODIUM 5000 [USP'U]/ML
5000 INJECTION, SOLUTION INTRAVENOUS; SUBCUTANEOUS EVERY 8 HOURS SCHEDULED
Status: DISCONTINUED | OUTPATIENT
Start: 2022-02-18 | End: 2022-02-19 | Stop reason: HOSPADM

## 2022-02-18 RX ADMIN — HEPARIN SODIUM 5000 UNITS: 5000 INJECTION INTRAVENOUS; SUBCUTANEOUS at 23:53

## 2022-02-18 RX ADMIN — CLONIDINE HYDROCHLORIDE 0.1 MG: 0.1 TABLET ORAL at 18:44

## 2022-02-18 RX ADMIN — METOCLOPRAMIDE HYDROCHLORIDE 10 MG: 5 INJECTION INTRAMUSCULAR; INTRAVENOUS at 19:06

## 2022-02-18 RX ADMIN — CLONIDINE HYDROCHLORIDE 0.1 MG: 0.1 TABLET ORAL at 20:41

## 2022-02-18 RX ADMIN — MORPHINE SULFATE 4 MG: 4 INJECTION, SOLUTION INTRAMUSCULAR; INTRAVENOUS at 19:06

## 2022-02-18 RX ADMIN — FENTANYL CITRATE 50 MCG: 50 INJECTION, SOLUTION INTRAMUSCULAR; INTRAVENOUS at 23:51

## 2022-02-18 RX ADMIN — DIPHENHYDRAMINE HYDROCHLORIDE 12.5 MG: 50 INJECTION, SOLUTION INTRAMUSCULAR; INTRAVENOUS at 19:05

## 2022-02-18 ASSESSMENT — PAIN DESCRIPTION - ORIENTATION: ORIENTATION: RIGHT

## 2022-02-18 ASSESSMENT — PAIN DESCRIPTION - LOCATION
LOCATION: HEAD
LOCATION: ABDOMEN

## 2022-02-18 ASSESSMENT — PAIN SCALES - GENERAL
PAINLEVEL_OUTOF10: 6
PAINLEVEL_OUTOF10: 6

## 2022-02-18 NOTE — ED PROVIDER NOTES
[facility] C3 TELE/MED SURG/ONC      CHIEF COMPLAINT  Headache (tuesday began with a headache, constant, pt came in due to elevated bp.  pt denies blurry vision. + dizziness. ) and Hematuria (pt has + blood in urine. he reports he is on kidney transplant list, he feels blood is worse right now.  )      SHARED SERVICE VISIT  I have seen and evaluated this patient with my supervising physician, Dr. Kasandra Ruth. HISTORY OF PRESENT ILLNESS  Nya Monroe is a 37 y.o. male PKD/CKD IV (pending transplant); known cerebral aneurysm (pending clipping in April); HTN; presents ED for evaluation of elevated blood pressure, headache x4 days as well as abdominal discomfort and hematuria. Patient reports he has been getting headaches more frequently since October with his headaches beginning 4 days ago. Denies thunderclap headache or syncope. Due to this patient has been taking ibuprofen more frequently. 2 days ago he developed worsening blood in his urine that has improved slightly since then. He reports his blood being more than his baseline. Mitts to diffuse abdominal discomfort. States he does not feel more distended than usual.  No chest pain or bloody breathing. No other complaints, modifying factors or associated symptoms. Nursing notes reviewed.    Past Medical History:   Diagnosis Date    ADD (attention deficit disorder with hyperactivity)     Hyperlipidemia     Hypertension     PKD (polycystic kidney disease)     Renal disease     polycytic     Past Surgical History:   Procedure Laterality Date    HERNIA REPAIR      OTHER SURGICAL HISTORY Bilateral 5/15/13    BILATERAL LAPAROSCOPIC PREPERITONEAL INGUINAL HERNIA REPAIR    UMBILICAL HERNIA REPAIR N/A 9/80/3235    OPEN UMBILICAL HERNIA REPAIR performed by Saulo Owen MD at [address] [place] [address]  3/15/2011    VASECTOMY      WISDOM TOOTH EXTRACTION       Family History   Problem Relation Age of Onset    Kidney Disease Mother     Heart Disease Mother         CHF    High Blood Pressure Sister     High Cholesterol Sister     Other Sister         PKD    High Blood Pressure Brother     High Cholesterol Brother     Other Brother         PKD     Social History     Socioeconomic History    Marital status:      Spouse name: Not on file    Number of children: Not on file    Years of education: Not on file    Highest education level: Not on file   Occupational History    Not on file   Tobacco Use    Smoking status: Former Smoker     Packs/day: 0.25     Years: 26.00     Pack years: 6.50     Types: Cigarettes    Smokeless tobacco: Never Used    Tobacco comment: 1 pack per month   Vaping Use    Vaping Use: Never used   Substance and Sexual Activity    Alcohol use: Yes     Comment: rarely    Drug use: Not Currently    Sexual activity: Yes     Partners: Female   Other Topics Concern    Not on file   Social History Narrative    Not on file     Social Determinants of Health     Financial Resource Strain: Low Risk     Difficulty of Paying Living Expenses: Not hard at all   Food Insecurity: No Food Insecurity    Worried About Running Out of Food in the Last Year: Never true    Naomi of Food in the Last Year: Never true   Transportation Needs: No Transportation Needs    Lack of Transportation (Medical): No    Lack of Transportation (Non-Medical):  No   Physical Activity:     Days of Exercise per Week: Not on file    Minutes of Exercise per Session: Not on file   Stress:     Feeling of Stress : Not on file   Social Connections:     Frequency of Communication with Friends and Family: Not on file    Frequency of Social Gatherings with Friends and Family: Not on file    Attends Mandaeism Services: Not on file    Active Member of Clubs or Organizations: Not on file    Attends Club or Organization Meetings: Not on file    Marital Status: Not on file   Intimate Partner Violence:     Fear of Current or Ex-Partner: Not on file    Emotionally Abused: Not on file    Physically Abused: Not on file    Sexually Abused: Not on file   Housing Stability: Unknown    Unable to Pay for Housing in the Last Year: No    Number of Places Lived in the Last Year: Not on file    Unstable Housing in the Last Year: No     Current Facility-Administered Medications   Medication Dose Route Frequency Provider Last Rate Last Admin    melatonin disintegrating tablet 5 mg  5 mg Oral Nightly Apple Robertson, APRN - CNP        allopurinol (ZYLOPRIM) tablet 100 mg  100 mg Oral Daily Apple Robertson, APRN - CNP        amLODIPine (NORVASC) tablet 10 mg  10 mg Oral Daily Apple Robertson, APRN - CNP        citalopram (CELEXA) tablet 10 mg  10 mg Oral Daily Apple Robertson, APRN - CNP        pantoprazole (PROTONIX) tablet 40 mg  40 mg Oral QAM AC Apple Robertson, APRN - CNP        atorvastatin (LIPITOR) tablet 40 mg  40 mg Oral Daily Apple Robertson, APRN - CNP        cloNIDine (CATAPRES) tablet 0.2 mg  0.2 mg Oral BID Apple Robertson, APRN - CNP        sodium chloride flush 0.9 % injection 5-40 mL  5-40 mL IntraVENous 2 times per day Apple Robertson, APRN - CNP        sodium chloride flush 0.9 % injection 5-40 mL  5-40 mL IntraVENous PRN Apple Robertson, APRN - CNP        0.9 % sodium chloride infusion  25 mL IntraVENous PRN Apple Robertson, APRN - CNP        ondansetron (ZOFRAN-ODT) disintegrating tablet 4 mg  4 mg Oral Q8H PRN Apple Robertson, APRN - CNP        Or    ondansetron (ZOFRAN) injection 4 mg  4 mg IntraVENous Q6H PRN Apple Robertson, APRN - CNP        polyethylene glycol (GLYCOLAX) packet 17 g  17 g Oral Daily PRN Apple Robertson, APRN - CNP        acetaminophen (TYLENOL) tablet 650 mg  650 mg Oral Q6H PRN Apple Robertson, APRN - CNP        Or    acetaminophen (TYLENOL) suppository 650 mg  650 mg Rectal Q6H PRN Apple Robertson, APRN - CNP        heparin (porcine) injection 5,000 Units  5,000 Units SubCUTAneous 3 times per day Mike , APRN - CNP   5,000 Units at 02/18/22 2353    perflutren lipid microspheres (DEFINITY) injection 1.65 mg  1.5 mL IntraVENous ONCE PRN Mike , APRN - CNP        fentaNYL (SUBLIMAZE) injection 50 mcg  50 mcg IntraVENous Q3H PRN Mike , APRN - CNP   50 mcg at 02/18/22 2351    labetalol (NORMODYNE;TRANDATE) injection 10 mg  10 mg IntraVENous Q6H PRN Mike , APRN - CNP         Allergies   Allergen Reactions    Bee Pollen     Hydrocodone Itching    Iodine Swelling    Nsaids      Does not take due to kidney function issues    Peanut-Containing Drug Products      Temple tree dust    RANDA [employer] Products        REVIEW OF SYSTEMS  10 systems reviewed, pertinent positives per HPI otherwise noted to be negative    PHYSICAL EXAM  BP (!) 169/97   Pulse 75   Temp 99.1 °F (37.3 °C) (Oral)   Resp 16   Wt 214 lb (97.1 kg)   SpO2 97%   BMI 29.02 kg/m²   GENERAL APPEARANCE: Awake and alert. Cooperative. HEAD: Normocephalic. Atraumatic. EYES: EOM's grossly intact. ENT: Mucous membranes are moist.   NECK: Supple. HEART: RRR. No murmurs. LUNGS: Respirations unlabored. CTAB. Good air exchange. Speaking comfortably in full sentences. ABDOMEN: Distended, tenderness palpation diffusely. Bowel sounds positive. EXTREMITIES: No peripheral edema. Moves all extremities equally. All extremities neurovascularly intact. SKIN: Warm and dry. No acute rashes. NEUROLOGICAL: Alert and oriented. CN's 2-12 intact. No gross facial drooping. Strength 5/5, sensation intact. PSYCHIATRIC: Normal mood and affect. RADIOLOGY  CT CHEST ABDOMEN PELVIS WO CONTRAST   Final Result   CT of the chest:      CT of the abdomen and pelvis:      Interval development of enhancing mass lesion within the anterior aspect of   superior pole of the left kidney which was not present on prior examination   measuring 4.8 x 4.9 cm concerning for renal cell carcinoma.       Very ill-defined hypodense mass lesion is noted within segment 4A of the   liver measuring 3.4 x 3.3 cm. Finding was not present on prior examination   and may represent unusual fat deposition or may represent other type of   infiltrative liver mass lesions. Metastatic disease cannot be excluded. .   The lesion does not have appearance of cyst or hemangioma. Polycystic kidney disease. The kidneys are enlarged with multiple cysts. Unchanged scattered small hepatic cysts. CT Head WO Contrast   Final Result   1. No acute intracranial abnormality.    2. Stable small right-sided arachnoid cyst.         US ABDOMEN COMPLETE    (Results Pending)       LABS  Labs Reviewed   CBC WITH AUTO DIFFERENTIAL - Abnormal; Notable for the following components:       Result Value    Hemoglobin 13.2 (*)     Hematocrit 38.6 (*)     RDW 15.5 (*)     All other components within normal limits    Narrative:     Performed at:  [address], [facility]   Phone [phone]   COMPREHENSIVE METABOLIC PANEL W/ REFLEX TO MG FOR LOW K - Abnormal; Notable for the following components:    BUN 51 (*)     CREATININE 4.7 (*)     GFR Non- 14 (*)     GFR  17 (*)     All other components within normal limits    Narrative:     Performed at:  [place] [address] [facility]   Phone [phone]   URINALYSIS WITH MICROSCOPIC - Abnormal; Notable for the following components:    Blood, Urine MODERATE (*)     Protein,  (*)     RBC, UA 11-20 (*)     Bacteria, UA Rare (*)     Yeast, UA Present (*)     All other components within normal limits    Narrative:     Performed at:  [facility]) [address], [facility]   Phone [phone]   CULTURE, URINE   TROPONIN    Narrative:     Performed at:  [address] [place] [address]   Phone [phone]    Narrative:     Performed at:  [facility]) [facility]  [address] [place] [address],  [place], [address] [place], [place] [address]melita Robertson   Phone [phone]   COMPREHENSIVE METABOLIC PANEL W/ REFLEX TO MG FOR LOW K   CBC WITH AUTO DIFFERENTIAL       PROCEDURES  Unless otherwise noted below, none  Procedures       CRITICAL CARE TIME  The total critical care time spent while evaluating and treating this patient was 36 minutes. This excludes time spent doing separately billable procedures. This includes time at the bedside, data interpretation, medication management, obtaining critical history from collateral sources if the patient is unable to provide it directly, and physician consultation. Specifics of interventions taken and potentially life-threatening diagnostic considerations are listed above in the medical decision making. MDM  MARYBETH Martin is a 37 y.o. male PKD/CKD IV (pending transplant); known cerebral aneurysm (pending clipping in April); HTN; in the emergency department for evaluation of worsening abdominal pain, hematuria as well as headache with uncontrolled hypertension. On arrival patient was hypertensive 174/128 and has taken his daily medications. He was given additional dose of clonidine 0.1 mg placed on cardiac monitor for frequent reevaluations. Renal function. BUN/Cr 51/4.7 with a GFR of 14. Last chemistry demonstrated creatinine 3.68 GFR 20, 2 months prior. CT was obtained which demonstrated interval development of enhancing mass lesions within the anterior aspect of superior pole left kidney which was not present prior; which is concerning for renal cell carcinoma. A ill-defined lesion in the liver with metastatic disease not excluded from differential.    I consulted nephrology to discuss his worsening renal failure as well as these new suspicious renal masses.   Given the elevated blood pressure with a cerebral aneurysm as well as these new found lesions and worsening renal failure; neurology recommended admission to the hospital service for blood pressure control, pain control and further investigation. DISPOSITION  Request admission. CLINICAL IMPRESSION  1. Acute renal failure, unspecified acute renal failure type ([facility] [address]    2. Renal mass    3. Uncontrolled hypertension    4.  Hypodense mass of liver            Em Stubbs PA-C  02/19/22 0037

## 2022-02-18 NOTE — ED PROVIDER NOTES
I independently performed a history and physical on Emi Garcia. All diagnostic, treatment, and disposition decisions were made by myself in conjunction with the advanced practice provider. I personally saw the patient and performed a substantive portion of the visit including all aspects of the medical decision making. For further details of Sarahi Shiland emergency department encounter, please see KEYLA Sheehan's documentation. Patient is a 71-year-old male presenting today due to concern for moderate headache over the last couple of days but also for significant mid to lower back pain associated with nausea similar to whenever he has had concerns with ruptured renal cyst.  He does have a history of polycystic kidney disease and is currently on a transplant list related to this. He felt slightly lightheaded related to the headache and abdominal/flank pain but denies any vertigo or new numbness or weakness in the arms or legs (chronic left arm tingling). 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 He reports having intermittent headaches for months now this is a typical headache and not the worst of his life. He denies any neck pain or stiffness. He denies any photophobia. He denies any fever. He did report having increasing hematuria over the last couple of days which is another reason why he was worried about a ruptured renal cyst.  His nephrologist is Dr. Kenya De Leon. He follows up with good [facility] related to his cerebral aneurysm. He reports having some mild left hand tingling but states that is typical for him and denies any new changes with his arms or legs. Physical:   Gen: No acute distress. AOx3.   Psych: Normal mood and affect  HEENT: NCAT, EOMI, PERRL, MMM  Neck: supple, normal ROM, no nuchal rigidity  Cardiac: RRR, pulses 2+ in upper extremities  Lungs: C2AB, no R/R/W  Abdomen: soft and mild lower generalized tenderness with no R/D/G, mild bilateral CVA tenderness  Back: No midline tenderness to palpation to C/T/L spine  Neuro: no focal neuro deficits with strength and sensation 5/5 in all 4 extremities including bilateral median/radial/ulnar nerves and no true objective tingling to either arm or leg, NIHSS = 0, negative test of skew, normal ambulation with no ataxia, negative Romberg sign, normal finger-to-nose bilaterally, normal heel-to-shin bilaterally    The Ekg interpreted by me shows  normal sinus rhythm with a rate of 85  Axis is   Normal  QTc is  within an acceptable range  Intervals and Durations are unremarkable. ST Segments: no acute change and nonspecific changes  No significant change from prior EKG dated - 1/27/21  No STEMI       MDM: Patient was evaluated due to concern for moderate headache with elevated blood pressure but also due to concern for hematuria with bilateral flank and some lower abdominal discomfort. CT showed concern for bilateral polycystic kidney disease with possibility of a renal mass. Nephrology ultimately recommended admission for blood pressure control. He was informed of this finding by physician assistant. He will most likely need to see oncology related to this. In regards to his headache, I feel this is more likely related to his elevated blood pressure and the headache did not come on suddenly and he denies any nuchal rigidity or photophobia. He denies any new neurological changes. I did discuss with him that with his elevated pressure there is a chance this could be related to a sentinel bleed that could ultimately be life-threatening if he did have a aneurysm rupture.   He had full mental capacity to make his own medical decisions and was declining lumbar puncture at this time and felt that this was a typical headache for him and at this point he does not want any further testing for the headache and was more concerned about his back at this point with the hematuria. He was stable for the floor with telemetry and feels comfortable with this plan.      Niles Funez MD  02/19/22 2052

## 2022-02-19 ENCOUNTER — APPOINTMENT (OUTPATIENT)
Dept: ULTRASOUND IMAGING | Age: 42
DRG: 687 | End: 2022-02-19
Payer: COMMERCIAL

## 2022-02-19 VITALS
OXYGEN SATURATION: 98 % | DIASTOLIC BLOOD PRESSURE: 87 MMHG | TEMPERATURE: 98.8 F | RESPIRATION RATE: 16 BRPM | SYSTOLIC BLOOD PRESSURE: 136 MMHG | WEIGHT: 214 LBS | HEART RATE: 67 BPM | BODY MASS INDEX: 29.02 KG/M2

## 2022-02-19 LAB
A/G RATIO: 1.7 (ref 1.1–2.2)
ALBUMIN SERPL-MCNC: 4.4 G/DL (ref 3.4–5)
ALP BLD-CCNC: 73 U/L (ref 40–129)
ALT SERPL-CCNC: 21 U/L (ref 10–40)
ANION GAP SERPL CALCULATED.3IONS-SCNC: 15 MMOL/L (ref 3–16)
AST SERPL-CCNC: 18 U/L (ref 15–37)
BASOPHILS ABSOLUTE: 0.1 K/UL (ref 0–0.2)
BASOPHILS RELATIVE PERCENT: 1.3 %
BILIRUB SERPL-MCNC: <0.2 MG/DL (ref 0–1)
BUN BLDV-MCNC: 51 MG/DL (ref 7–20)
CALCIUM SERPL-MCNC: 9.4 MG/DL (ref 8.3–10.6)
CHLORIDE BLD-SCNC: 104 MMOL/L (ref 99–110)
CO2: 22 MMOL/L (ref 21–32)
CREAT SERPL-MCNC: 4.5 MG/DL (ref 0.9–1.3)
EKG ATRIAL RATE: 85 BPM
EKG DIAGNOSIS: NORMAL
EKG P AXIS: 47 DEGREES
EKG P-R INTERVAL: 170 MS
EKG Q-T INTERVAL: 386 MS
EKG QRS DURATION: 92 MS
EKG QTC CALCULATION (BAZETT): 459 MS
EKG R AXIS: 26 DEGREES
EKG T AXIS: 34 DEGREES
EKG VENTRICULAR RATE: 85 BPM
EOSINOPHILS ABSOLUTE: 0.4 K/UL (ref 0–0.6)
EOSINOPHILS RELATIVE PERCENT: 6.5 %
GFR AFRICAN AMERICAN: 18
GFR NON-AFRICAN AMERICAN: 15
GLUCOSE BLD-MCNC: 107 MG/DL (ref 70–99)
HCT VFR BLD CALC: 36.6 % (ref 40.5–52.5)
HEMOGLOBIN: 12.6 G/DL (ref 13.5–17.5)
LYMPHOCYTES ABSOLUTE: 1.9 K/UL (ref 1–5.1)
LYMPHOCYTES RELATIVE PERCENT: 30.9 %
MCH RBC QN AUTO: 28.1 PG (ref 26–34)
MCHC RBC AUTO-ENTMCNC: 34.5 G/DL (ref 31–36)
MCV RBC AUTO: 81.6 FL (ref 80–100)
MONOCYTES ABSOLUTE: 0.6 K/UL (ref 0–1.3)
MONOCYTES RELATIVE PERCENT: 10.3 %
NEUTROPHILS ABSOLUTE: 3.1 K/UL (ref 1.7–7.7)
NEUTROPHILS RELATIVE PERCENT: 51 %
PDW BLD-RTO: 15.2 % (ref 12.4–15.4)
PLATELET # BLD: 259 K/UL (ref 135–450)
PMV BLD AUTO: 7.6 FL (ref 5–10.5)
POTASSIUM REFLEX MAGNESIUM: 4.1 MMOL/L (ref 3.5–5.1)
RBC # BLD: 4.48 M/UL (ref 4.2–5.9)
SODIUM BLD-SCNC: 141 MMOL/L (ref 136–145)
TOTAL PROTEIN: 7 G/DL (ref 6.4–8.2)
URINE CULTURE, ROUTINE: NORMAL
WBC # BLD: 6.1 K/UL (ref 4–11)

## 2022-02-19 PROCEDURE — 2580000003 HC RX 258: Performed by: NURSE PRACTITIONER

## 2022-02-19 PROCEDURE — 6370000000 HC RX 637 (ALT 250 FOR IP): Performed by: INTERNAL MEDICINE

## 2022-02-19 PROCEDURE — 99222 1ST HOSP IP/OBS MODERATE 55: CPT | Performed by: INTERNAL MEDICINE

## 2022-02-19 PROCEDURE — 80053 COMPREHEN METABOLIC PANEL: CPT

## 2022-02-19 PROCEDURE — 6360000002 HC RX W HCPCS: Performed by: NURSE PRACTITIONER

## 2022-02-19 PROCEDURE — 76700 US EXAM ABDOM COMPLETE: CPT

## 2022-02-19 PROCEDURE — 6370000000 HC RX 637 (ALT 250 FOR IP): Performed by: NURSE PRACTITIONER

## 2022-02-19 PROCEDURE — 85025 COMPLETE CBC W/AUTO DIFF WBC: CPT

## 2022-02-19 PROCEDURE — 36415 COLL VENOUS BLD VENIPUNCTURE: CPT

## 2022-02-19 PROCEDURE — 93010 ELECTROCARDIOGRAM REPORT: CPT | Performed by: INTERNAL MEDICINE

## 2022-02-19 RX ORDER — CLONIDINE 0.3 MG/24H
1 PATCH, EXTENDED RELEASE TRANSDERMAL WEEKLY
Status: DISCONTINUED | OUTPATIENT
Start: 2022-02-19 | End: 2022-02-19 | Stop reason: HOSPADM

## 2022-02-19 RX ORDER — MECOBALAMIN 5000 MCG
5 TABLET,DISINTEGRATING ORAL NIGHTLY
Status: DISCONTINUED | OUTPATIENT
Start: 2022-02-19 | End: 2022-02-19 | Stop reason: HOSPADM

## 2022-02-19 RX ORDER — NIFEDIPINE 30 MG/1
60 TABLET, EXTENDED RELEASE ORAL DAILY
Qty: 60 TABLET | Refills: 0 | Status: ON HOLD | OUTPATIENT
Start: 2022-02-20 | End: 2022-05-04 | Stop reason: HOSPADM

## 2022-02-19 RX ORDER — NIFEDIPINE 30 MG/1
60 TABLET, EXTENDED RELEASE ORAL DAILY
Status: DISCONTINUED | OUTPATIENT
Start: 2022-02-19 | End: 2022-02-19 | Stop reason: HOSPADM

## 2022-02-19 RX ADMIN — CLONIDINE HYDROCHLORIDE 0.2 MG: 0.1 TABLET ORAL at 09:28

## 2022-02-19 RX ADMIN — NIFEDIPINE 60 MG: 30 TABLET, FILM COATED, EXTENDED RELEASE ORAL at 09:27

## 2022-02-19 RX ADMIN — ALLOPURINOL 100 MG: 100 TABLET ORAL at 09:28

## 2022-02-19 RX ADMIN — HEPARIN SODIUM 5000 UNITS: 5000 INJECTION INTRAVENOUS; SUBCUTANEOUS at 05:53

## 2022-02-19 RX ADMIN — PANTOPRAZOLE SODIUM 40 MG: 40 TABLET, DELAYED RELEASE ORAL at 09:28

## 2022-02-19 RX ADMIN — ATORVASTATIN CALCIUM 40 MG: 40 TABLET, FILM COATED ORAL at 09:28

## 2022-02-19 RX ADMIN — SODIUM CHLORIDE, PRESERVATIVE FREE 10 ML: 5 INJECTION INTRAVENOUS at 01:00

## 2022-02-19 RX ADMIN — ACETAMINOPHEN 650 MG: 325 TABLET ORAL at 09:28

## 2022-02-19 RX ADMIN — CITALOPRAM HYDROBROMIDE 10 MG: 20 TABLET ORAL at 09:27

## 2022-02-19 RX ADMIN — SODIUM CHLORIDE, PRESERVATIVE FREE 10 ML: 5 INJECTION INTRAVENOUS at 09:35

## 2022-02-19 ASSESSMENT — ENCOUNTER SYMPTOMS
DIARRHEA: 0
ABDOMINAL PAIN: 1
SHORTNESS OF BREATH: 0
SORE THROAT: 0
EYE PAIN: 0
CONSTIPATION: 0
NAUSEA: 0
VOMITING: 0
COUGH: 0
RHINORRHEA: 0
BLOOD IN STOOL: 0
PHOTOPHOBIA: 0

## 2022-02-19 ASSESSMENT — PAIN DESCRIPTION - LOCATION: LOCATION: HEAD

## 2022-02-19 ASSESSMENT — PAIN SCALES - GENERAL: PAINLEVEL_OUTOF10: 2

## 2022-02-19 NOTE — CONSULTS
Consult call back    Who:Dr. Sue Menendez  Date:2/19/2022,  Time:2:09 PM        Electronically signed by Chelly Reeves on 2/19/2022 at 2:09 PM

## 2022-02-19 NOTE — CONSULTS
Consult placed    Who:Dr. Michelle Sampson  Date:2/19/2022,  Time:9:54 AM        Electronically signed by Edmar Moyer on 2/19/2022 at 9:54 AM

## 2022-02-19 NOTE — PROGRESS NOTES
Agree with assessment completed by student nurse Romana Oquendo. Pt w/ slight c/o headache, PRN tylenol given, see eMAR. Denies any further needs at this time. Will continue to monitor.

## 2022-02-19 NOTE — CONSULTS
[address] [place] [address]  [phone]      Attending Physician: Lucero Parrish MD  Reason for Consultation/Chief Complaint: Uncontrolled hypertension    Subjective   History of Present Illness:  Susie Esteban is a 37 y.o. male with a history of polycystic kidney disease, CKD, essential hypertension, and hyperlipidemia who presented with headaches and abdominal pain. He denies any associated chest pain, shortness of breath, nausea, vomiting, or diarrhea. He was hypertensive to 174/128 on admission and cardiology has been consulted for further recommendations regarding management of his hypertension. Non-contrast head CT obtained in the ED was negative for any acute intracranial abnormality but did show stable small right-sided arachnoid cyst.  He does have a known aneurysm arising from the left V4 segment at the level of the left PICA origin that was seen on a previous brain MRI from [facility] in 3/2021. The aneurysm measured 5.4 cm on angiography on 12/14/21. CT of his abdomen/pelvis yesterday revealed an enhancing mass lesion within the anterior aspect of the superior pole of the left kidney measuring 4.8 x 4.9 cm and is concerning for possible renal cell carcinoma. There was also a a very ill-define hypodense mass lesion noted within the segment of 4A of the liver, measuring 3.4 x 3.3 cm. ECG showed normal sinus rhythm with no ischemic changes. Troponin T was negative. Currently, the patient is resting comfortably in bed and BP has improved to 138/98. Past Medical History:   has a past medical history of ADD (attention deficit disorder with hyperactivity), Hyperlipidemia, Hypertension, PKD (polycystic kidney disease), and Renal disease. Surgical History:   has a past surgical history that includes Upper gastrointestinal endoscopy (3/15/2011); Vasectomy; [place] tooth extraction; other surgical history (Bilateral, 5/15/13);  Umbilical hernia repair (N/A, 4/17/2019); and hernia repair. Social History:   reports that he has quit smoking. His smoking use included cigarettes. He has a 6.50 pack-year smoking history. He has never used smokeless tobacco. He reports current alcohol use. He reports previous drug use. Family History:  family history includes Heart Disease in his mother; High Blood Pressure in his brother and sister; High Cholesterol in his brother and sister; Kidney Disease in his mother; Other in his brother and sister. Home Medications:  Were reviewed and are listed in nursing record and/or below  Prior to Admission medications    Medication Sig Start Date End Date Taking?  Authorizing Provider   omeprazole (PRILOSEC) 40 MG delayed release capsule TAKE 1 CAPSULE BY MOUTH EVERY DAY IN THE MORNING BEFORE BREAKFAST 2/7/22   Shun Lies, DO   citalopram (CELEXA) 10 MG tablet TAKE 1 TABLET BY MOUTH EVERY DAY 11/12/21   Shun Lies, DO   simvastatin (ZOCOR) 40 MG tablet TAKE 1 TABLET BY MOUTH EVERY DAY AT NIGHT 10/27/21   Shun Lies, DO   amLODIPine (NORVASC) 10 MG tablet TAKE 1 TABLET BY MOUTH EVERY DAY 10/18/21   Shun Lies, DO   cloNIDine (CATAPRES) 0.3 MG/24HR PTWK PLACE 1 PATCH ONTO THE SKIN EVERY 7 DAYS. 4/1/21   Historical Provider, MD   allopurinol (ZYLOPRIM) 100 MG tablet TAKE 1 TABLET BY MOUTH EVERY DAY 4/23/21   Shun Lies, DO        CURRENT Medications:  melatonin disintegrating tablet 5 mg, Nightly  NIFEdipine (PROCARDIA XL) extended release tablet 60 mg, Daily  allopurinol (ZYLOPRIM) tablet 100 mg, Daily  citalopram (CELEXA) tablet 10 mg, Daily  pantoprazole (PROTONIX) tablet 40 mg, QAM AC  atorvastatin (LIPITOR) tablet 40 mg, Daily  cloNIDine (CATAPRES) tablet 0.2 mg, BID  sodium chloride flush 0.9 % injection 5-40 mL, 2 times per day  sodium chloride flush 0.9 % injection 5-40 mL, PRN  0.9 % sodium chloride infusion, PRN  ondansetron (ZOFRAN-ODT) disintegrating tablet 4 mg, Q8H PRN   Or  ondansetron (ZOFRAN) injection 4 mg, Q6H PRN  polyethylene glycol (GLYCOLAX) packet 17 g, Daily PRN  acetaminophen (TYLENOL) tablet 650 mg, Q6H PRN   Or  acetaminophen (TYLENOL) suppository 650 mg, Q6H PRN  heparin (porcine) injection 5,000 Units, 3 times per day  perflutren lipid microspheres (DEFINITY) injection 1.65 mg, ONCE PRN  fentaNYL (SUBLIMAZE) injection 50 mcg, Q3H PRN  labetalol (NORMODYNE;TRANDATE) injection 10 mg, Q6H PRN        Allergies:  Bee pollen, Hydrocodone, Iodine, Nsaids, Peanut-containing drug products, and Shellfish-derived products     Review of Systems:   Review of Systems   Constitutional: Negative for chills and fever. HENT: Negative for congestion, rhinorrhea and sore throat. Eyes: Negative for photophobia, pain and visual disturbance. Respiratory: Negative for cough and shortness of breath. Cardiovascular: Negative for chest pain, palpitations and leg swelling. Gastrointestinal: Positive for abdominal pain. Negative for blood in stool, constipation, diarrhea, nausea and vomiting. Endocrine: Negative for cold intolerance and heat intolerance. Genitourinary: Negative for difficulty urinating, dysuria and hematuria. Musculoskeletal: Negative for arthralgias, joint swelling and myalgias. Skin: Negative for rash and wound. Allergic/Immunologic: Negative for environmental allergies and food allergies. Neurological: Negative for dizziness, syncope and light-headedness. Hematological: Negative for adenopathy. Does not bruise/bleed easily. Psychiatric/Behavioral: Negative for dysphoric mood. The patient is not nervous/anxious. Objective   PHYSICAL EXAM:    Vitals:    02/19/22 0545   BP: (!) 138/98   Pulse: 71   Resp: 16   Temp: 99.1F   SpO2: 97% on room air    Weight: 214 lb (97.1 kg)       General: Adult male lying in bed in no acute distress.  Pleasant and interactive on exam.  HEENT: Normocephalic, atraumatic, non-icteric, hearing intact, nares normal, mucous membranes moist.  Neck: Supple, trachea midline. No adenopathy. No thyromegaly. No carotid bruits. No JVD. Heart: Regular rate and rhythm. Normal S1 and S2. No murmurs, gallops or rubs. Lungs: Normal respiratory effort. Clear to auscultation bilaterally. No wheezes, rales, or rhonchi. Abdomen: Soft, non-tender. Normoactive bowel sounds. No masses or organomegaly. Skin: No rashes, wounds, or lesions. Pulses: 2+ and symmetric. Extremities: No clubbing, cyanosis, or edema. Musculoskeletal: Spontaneously moves all four extremities. Psych: Normal mood and affect. Neuro: Alert and oriented to person, place, and time. No focal deficits noted. Labs   CBC:   Lab Results   Component Value Date    WBC 6.1 02/19/2022    RBC 4.48 02/19/2022    HGB 12.6 02/19/2022    HCT 36.6 02/19/2022    MCV 81.6 02/19/2022    RDW 15.2 02/19/2022     02/19/2022     CMP:  Lab Results   Component Value Date     02/19/2022    K 4.1 02/19/2022     02/19/2022    CO2 22 02/19/2022    BUN 51 02/19/2022    CREATININE 4.5 02/19/2022    GFRAA 18 02/19/2022    GFRAA >60 05/02/2013    AGRATIO 1.7 02/19/2022    LABGLOM 15 02/19/2022    GLUCOSE 107 02/19/2022    PROT 7.0 02/19/2022    PROT 7.6 09/21/2011    CALCIUM 9.4 02/19/2022    BILITOT <0.2 02/19/2022    ALKPHOS 73 02/19/2022    AST 18 02/19/2022    ALT 21 02/19/2022     PT/INR:  No results found for: PTINR  HgBA1c:No results found for: LABA1C  Lab Results   Component Value Date    TROPONINI <0.01 02/18/2022         Cardiac Data     Last EKG: Normal sinus rhythm, no ischemic changes    Echo:  TTE 3/26/21:  Study Conclusions:  - Left ventricle: The cavity size is normal. Wall thickness is     normal. The calculated ejection fraction was 52%. Normal     diastolic function. The stroke volume is 81ml. The stroke index     is 37ml/m^2. The global longitudinal strain was -19%. Ejection     fraction (EF) was calculated using 2D biplane Chilel&apos;s method. - Aortic valve:  The mean systolic gradient is 4mm Hg. The peak     systolic gradient is 6mm Hg. The valve area by the velocity-time     integral method is 3.1cm^2. The valve area index by the     velocity-time integral method is 1.42cm^2/m^2. The valve area by     the mean velocity method is 3.3cm^2. - Inferior vena cava: The vessel was normal in size; the     respirophasic diameter changes were in the normal range (&gt;= 50%);     findings are consistent with normal central venous pressure. - Pericardium, extracardiac: A trivial pericardial effusion was     identified. Stress Test:   Exercise Stress Echo 4/28/21:  Study Conclusions   - Stress ECG conclusions: There were no stress arrhythmias or     conduction abnormalities. The stress ECG was negative for     ischemia. Nelson scoring: exercise time of 6min; maximum ST     deviation of 0mm; no angina; resulting score is 6. This score     predicts a low risk of cardiac events. - Stress echo: There was no echocardiographic evidence for     stress-induced ischemia. Impressions:   Normal study after maximal exercise without   reproduction of symptoms. The target heart rate of 154bpm was   achieved. Cath: N/A    Other Studies:   Non-contrast head CT 2/18/22:      1. No acute intracranial abnormality. 2. Stable small right-sided arachnoid cyst.     CT abdomen/pelvis 2/18/22:  CT of the abdomen and pelvis:       Interval development of enhancing mass lesion within the anterior aspect of   superior pole of the left kidney which was not present on prior examination   measuring 4.8 x 4.9 cm concerning for renal cell carcinoma.       Very ill-defined hypodense mass lesion is noted within segment 4A of the   liver measuring 3.4 x 3.3 cm.  Finding was not present on prior examination   and may represent unusual fat deposition or may represent other type of   infiltrative liver mass lesions.  Metastatic disease cannot be excluded. .   The lesion does not have appearance of cyst or hemangioma.       Polycystic kidney disease.  The kidneys are enlarged with multiple cysts.       Unchanged scattered small hepatic cysts. Assessment and Plan      1. Uncontrolled hypertension - BP was elevated at 174/128 on admission, but has since improved to 138/98.  -Would recommend transitioning from amlodipine to nifedipine 60 mg daily and can titrate further as tolerated  -Continue 0.3 mg/24 hour clonidine patch  -Given advanced underlying CKD, would continue avoid ACEI/ARB and thiazide diuretics   -If BP remains difficult to control, can consider further work-up for secondary causes of hypertension (renin/aldosterone, cortisol level, renal artery ultrasound)  -TTE was ordered by primary team, but patient had study done in 3/2021 that showed normal LV size, wall thickness, and systolic function and normal RV size and systolic function and no hemodynamically significant valvular abnormalities and there is currently no strong indication for repeating at this time    2. Abdominal pain - Source not entirely clear, but could potentially be related to renal or hepatic masses seen on CT. Low concern for anginal equivalent at this time. ECG is without ischemic changes. Initial troponin T was negative.  -Continue work-up of newly diagnosed kidney and abdominal mass per primary team  -No further inpatient cardiac ischemic evaluation is currently indicated    3. CKD/Polycystic kidney disease  -Nephrology consulted, appreciate assistance  -Continue to avoid nephrotoxic agents as able     4. Liver mass/New enhancing renal mass concerning for possible renal cell carcinoma  -Further work-up per primary team and nephrology    5. Cerebral aneurysm  -Will need to be monitored with serial imaging  -Needs follow-up with neurosurgery    6. Hyperlipidemia  -Continue atorvastatin 40 mg daily      Thank you for allowing us to participate in the care of [facility].  Please call me with any questions [phone]. Samy Gaspar DO [address]  [phone] [facility]  [phone] [address] [place] [address]  2/19/2022 9:13 AM      I will address the patient's cardiac risk factors and adjusted pharmacologic treatment as needed. In addition, I have reinforced the need for patient directed risk factor modification. All questions and concerns were addressed to the patient/family. Alternatives to my treatment were discussed. The note was completed using EMR. Every effort was made to ensure accuracy; however, inadvertent computerized transcription errors may be present.

## 2022-02-19 NOTE — CONSULTS
Urology Attending Consult Note      Reason for Consultation: renal mass    History: 38 y/o male admitted with hypertension, headache, CKD, ADPCKD was found to have an enhancing renal mass on CT. There is question of possible liver mass. Family History, Social History, Review of Systems:  Reviewed and agreed to as per chart    Vitals:  /85   Pulse 69   Temp 98.9 °F (37.2 °C) (Oral)   Resp 16   Wt 214 lb (97.1 kg)   SpO2 97%   BMI 29.02 kg/m²   Temp  Av.8 °F (37.1 °C)  Min: 97.8 °F (36.6 °C)  Max: 99.2 °F (37.3 °C)  No intake or output data in the 24 hours ending 22 1248      Physical:   Well developed, well nourished in no acute distress   Mood indicates no abnormalities. Pt doesnt appear depressed   Orientated to time and place   Neck is supple, trachea is midline   Respiratory effort is normal   Cardiovascular show no extremity swelling   Abdomen soft, no masses or hernias are palpated, there is no tenderness. Liver and Spleen appear normal.   Skin show no abnormal lesions   Lymph nodes are not palpated in the inguinal, neck, or axillary area.     Labs:  WBC:    Lab Results   Component Value Date    WBC 6.1 2022     Hemoglobin/Hematocrit:    Lab Results   Component Value Date    HGB 12.6 2022    HCT 36.6 2022     BMP:    Lab Results   Component Value Date     2022    K 4.1 2022     2022    CO2 22 2022    BUN 51 2022    LABALBU 4.4 2022    CREATININE 4.5 2022    CALCIUM 9.4 2022    GFRAA 18 2022    GFRAA >60 2013    LABGLOM 15 2022     PT/INR:    Lab Results   Component Value Date    PROTIME 10.6 2022    INR 0.94 2022     PTT:  No results found for: APTT[APTT    Imaging: CT-   Interval development of enhancing mass lesion within the anterior aspect of   superior pole of the left kidney which was not present on prior examination   measuring 4.8 x 4.9 cm concerning for renal cell carcinoma.       Very ill-defined hypodense mass lesion is noted within segment 4A of the   liver measuring 3.4 x 3.3 cm.  Finding was not present on prior examination   and may represent unusual fat deposition or may represent other type of   infiltrative liver mass lesions.  Metastatic disease cannot be excluded. .   The lesion does not have appearance of cyst or hemangioma.       Polycystic kidney disease.  The kidneys are enlarged with multiple cysts. Impression/Plan: left renal mass, polycystic kidneys  1. Renal mass- Patient is on the transplant list at [facility] and I advised him he needs to update his coordinator on CT findings. If he does need surgery in the future he would have to have his hypertension and aneurysm address first and then the type of surgery discussed with his transplant surgeons and properly coordinated. His kidneys are so massive the surgery would have to be done through a large incision and likely at a tertiary care center, not a community hospital.  2. Will follow while he is in the hospital but he will eventually need Urologic care from [facility] or  or possibly [place] at a later time.     Yary Busch MD

## 2022-02-19 NOTE — PROGRESS NOTES
Hospitalist Progress Note      PCP: ANA Sheldon - CNP    Date of Admission: 2/18/2022    Chief Complaint: Headache and Abdominal Pain     Subjective: no new c/o. Medications:  Reviewed    Infusion Medications    sodium chloride       Scheduled Medications    melatonin  5 mg Oral Nightly    NIFEdipine  60 mg Oral Daily    cloNIDine  1 patch TransDERmal Weekly    allopurinol  100 mg Oral Daily    citalopram  10 mg Oral Daily    pantoprazole  40 mg Oral QAM AC    atorvastatin  40 mg Oral Daily    sodium chloride flush  5-40 mL IntraVENous 2 times per day    heparin (porcine)  5,000 Units SubCUTAneous 3 times per day     PRN Meds: sodium chloride flush, sodium chloride, ondansetron **OR** ondansetron, polyethylene glycol, acetaminophen **OR** acetaminophen, perflutren lipid microspheres, fentanNYL, labetalol    No intake or output data in the 24 hours ending 02/19/22 1013    Physical Exam Performed:    BP (!) 153/105   Pulse 63   Temp 99.2 °F (37.3 °C) (Oral)   Resp 16   Wt 214 lb (97.1 kg)   SpO2 97%   BMI 29.02 kg/m²     General appearance: No apparent distress, appears stated age and cooperative. HEENT: Pupils equal, round, and reactive to light. Conjunctivae/corneas clear. Neck: Supple, with full range of motion. No jugular venous distention. Trachea midline. Respiratory:  Normal respiratory effort. Clear to auscultation, bilaterally without Rales/Wheezes/Rhonchi. Cardiovascular: Regular rate and rhythm with normal S1/S2 without murmurs, rubs or gallops. Abdomen: Soft, non-tender, non-distended with normal bowel sounds. Musculoskeletal: No clubbing, cyanosis or edema bilaterally. Full range of motion without deformity. Skin: Skin color, texture, turgor normal.  No rashes or lesions. Neurologic:  Neurovascularly intact without any focal sensory/motor deficits.  Cranial nerves: II-XII intact, grossly non-focal.  Psychiatric: Alert and oriented, thought content appropriate, normal insight  Capillary Refill: Brisk,< 3 seconds   Peripheral Pulses: +2 palpable, equal bilaterally       Labs:   Recent Labs     02/18/22 1900 02/19/22  0549   WBC 7.4 6.1   HGB 13.2* 12.6*   HCT 38.6* 36.6*    259     Recent Labs     02/18/22  1900 02/19/22  0549    141   K 4.5 4.1    104   CO2 22 22   BUN 51* 51*   CREATININE 4.7* 4.5*   CALCIUM 9.3 9.4     Recent Labs     02/18/22  1900 02/19/22  0549   AST 18 18   ALT 21 21   BILITOT <0.2 <0.2   ALKPHOS 78 73     Recent Labs     02/18/22 1900   INR 0.94     Recent Labs     02/18/22 1900   TROPONINI <0.01       Urinalysis:      Lab Results   Component Value Date    NITRU Negative 02/18/2022    WBCUA 3-5 02/18/2022    BACTERIA Rare 02/18/2022    RBCUA 11-20 02/18/2022    BLOODU MODERATE 02/18/2022    SPECGRAV 1.020 02/18/2022    GLUCOSEU Negative 02/18/2022    GLUCOSEU NEGATIVE 09/21/2011       Consults:    IP CONSULT TO NEPHROLOGY  IP CONSULT TO HOSPITALIST  IP CONSULT TO NEPHROLOGY  IP CONSULT TO CARDIOLOGY  IP CONSULT TO DIETITIAN  IP CONSULT TO UROLOGY      Assessment/Plan:    Active Hospital Problems    Diagnosis     Abdominal pain [R10.9]     Uncontrolled hypertension [I10]     Headache [R51.9]     Chronic kidney disease [N18.9]          HTN - w/out known CAD and no evidence of active signs/sxs of ischemia/failure. Uncontrolled on admission, currently improved on home meds w/ vitals reviewed and documented as above. PRN anti-HTN meds. Echo ordered and pending. Cardiology consulted and appreciated.      CKD - baseline stage 5. W/ Hx PCKD. Follow serial labs. Reviewed and documented as above. Nephrology consulted and appreciated. Followed outpt by Dr. Marianna Rogers on Transplant list.     Hx Brain Aneurysm - plan for repair 8 April 2022. Abdominal pain - w/ Hx of scattered cystic lesions found on CT which is noted again. New L renal mass on admission CT scan w/ hematuria - possible Renal Cell CA.  Abdominal U/S ordered. Urology consulted and appreciated in advance.      HyperLipidemia - controlled on home Statin. Continue, w/ f/u and med adjustment w/ PCP         DVT Prophylaxis: SQ Heparin    Recent Labs     02/18/22  1900 02/19/22  0549    259     Diet: Diet NPO  Code Status: Full Code      PT/OT Eval Status: not yet ordered. Dispo - Patient is likely to remain in-house at least until Sunday/Monday 20/21 Feb pending clinical course and subspecialty recs.        Arleen Bush MD

## 2022-02-19 NOTE — CONSULTS
Nutrition Note    RD received consult for diet education, hx of HTN and CKD. Pt reports following low sodium diet at home. Reports travels frequently for work, often goes out to eat. Encouraged lower sodium options at restaurants. No further nutrition question regarding diet. Will continue to monitor per nutrition standards of care.      Jane Yeung, MS, RD, LD on 2/19/2022 at 1:06 PM  Office: [phone]

## 2022-02-19 NOTE — ED NOTES
Called [facility] @2000  Per Roxanna RAMIRES  RE Dr Steph Quintanilla; New renal masses; elevated BP  Dr. Chrystal Landers returned page @[phone]       Aminah Waddell  02/18/22 2007

## 2022-02-19 NOTE — H&P
Hospital Medicine History & Physical      PCP: ANA Martinez - CNP    Date of Admission: 2/18/2022    Date of Service: Pt seen/examined on 02/18/2022 and Admitted to Inpatient with expected LOS greater than two midnights due to medical therapy. Time of Service: 2120    Chief Complaint:  Headache and abdominal pain    History Of Present Illness:      Mr. Eulalia Santana is a 16-year-old male with a significant past medical history of hypertension, hyperlipidemia and polycystic kidney disease who presents to [facility] emergency department with a complaint of headache and abdominal pain since Tuesday. He notes his headache started first, which was gradual onset in nature and not atypical in characteristics for a headache for home, and rated it 10/10. He describes it has frontal, non-radiating, and without associated symptoms such as diplopia, blurred vision, numbness, or slurred speech. He has been trying to self-treat with Motrin PO sparingly given his CKD. The abdominal pain is epigastric in location, non-radiating, described as a sharp stabbing pain, and rated at 10/10. His evaluation here included laboratory studies, EKG, and CTs of the head and abdomen. CT of the head shows a small, stable right arachnoid cyst. CT of the abdomen and pelvis shows a newly developing left kidney mass concerning for possible renal cell carcinoma. Additionally, there is a ill-defined hypodense mass lesion on the liver that is newly noted from previous CT in 2020. Significant lab value include BUN 51, creatinine 4.7, and GFR 14. Troponin was negative. Cell count was unremarkable. UA showed hematuria and proteinuria which is unchanged from previous UAs. He was noted to be significantly hypertensive here. Clonidine 0.1mg PO x 2 doses was given in the ED under the direction of nephrology during ED phone consultation along with Reglan, Morphine, and Benadryl.  Hospital team was consulted to admit this patient for uncontrolled hypertension and abnormal renal findings on CT. Past Medical History:          Diagnosis Date    ADD (attention deficit disorder with hyperactivity)     Hyperlipidemia     Hypertension     PKD (polycystic kidney disease)     Renal disease     polycytic     Past Surgical History:          Procedure Laterality Date    HERNIA REPAIR      OTHER SURGICAL HISTORY Bilateral 5/15/13    BILATERAL LAPAROSCOPIC PREPERITONEAL INGUINAL HERNIA REPAIR    UMBILICAL HERNIA REPAIR N/A 6/57/2330    OPEN UMBILICAL HERNIA REPAIR performed by Keegan Connelly MD at [facility]  3/15/2011    VASECTOMY      WISDOM TOOTH EXTRACTION       Medications Prior to Admission:      Prior to Admission medications    Medication Sig Start Date End Date Taking? Authorizing Provider   omeprazole (PRILOSEC) 40 MG delayed release capsule TAKE 1 CAPSULE BY MOUTH EVERY DAY IN THE MORNING BEFORE BREAKFAST 2/7/22   Deandre Veras DO   citalopram (CELEXA) 10 MG tablet TAKE 1 TABLET BY MOUTH EVERY DAY 11/12/21   Deandre Veras DO   simvastatin (ZOCOR) 40 MG tablet TAKE 1 TABLET BY MOUTH EVERY DAY AT NIGHT 10/27/21   Deandre Veras DO   amLODIPine (NORVASC) 10 MG tablet TAKE 1 TABLET BY MOUTH EVERY DAY 10/18/21   Deandre Veras DO   cloNIDine (CATAPRES) 0.3 MG/24HR PTWK PLACE 1 PATCH ONTO THE SKIN EVERY 7 DAYS. 4/1/21   Historical Provider, MD   allopurinol (ZYLOPRIM) 100 MG tablet TAKE 1 TABLET BY MOUTH EVERY DAY 4/23/21   Deadnre Veras DO     Allergies:  Bee pollen, Hydrocodone, Iodine, Nsaids, Peanut-containing drug products, and Shellfish-derived products    Social History:      The patient currently lives at home. TOBACCO:   reports that he has quit smoking. His smoking use included cigarettes. He has a 6.50 pack-year smoking history. He has never used smokeless tobacco.  ETOH:   reports current alcohol use.   E-Cigarettes/Vaping Use     Questions Responses E-Cigarette/Vaping Use Never User    Start Date     Passive Exposure     Quit Date     Counseling Given     Comments         Family History:      Reviewed in detail; positive as follows:        Problem Relation Age of Onset    Kidney Disease Mother     Heart Disease Mother         CHF    High Blood Pressure Sister     High Cholesterol Sister     Other Sister         PKD    High Blood Pressure Brother     High Cholesterol Brother     Other Brother         PKD     REVIEW OF SYSTEMS COMPLETED:   Pertinent positives as noted in the HPI. All other systems reviewed and negative. PHYSICAL EXAM PERFORMED:    BP (!) 154/112   Pulse 78   Resp 14   Wt 214 lb (97.1 kg)   SpO2 98%   BMI 29.02 kg/m²     General appearance:  No apparent distress, appears stated age and cooperative. HEENT:  Normal cephalic, atraumatic without obvious deformity. Pupils equal, round, and reactive to light. Extra ocular muscles intact. Conjunctivae/corneas clear. Neck: Supple, with full range of motion. No jugular venous distention. Trachea midline. Respiratory:  Normal respiratory effort. Clear to auscultation, bilaterally without Rales/Wheezes/Rhonchi. Cardiovascular:  Regular rate and rhythm with normal S1/S2 without murmurs, rubs or gallops. Abdomen: Soft, non-tender, non-distended with normal bowel sounds. Musculoskeletal:  No clubbing, cyanosis or edema bilaterally. Full range of motion without deformity. Skin: Skin color, texture, turgor normal.  No rashes or lesions. Neurologic:  Neurovascularly intact without any focal sensory/motor deficits.  Cranial nerves: II-XII intact, grossly non-focal.  Psychiatric:  Alert and oriented, thought content appropriate, normal insight  Capillary Refill: Brisk,3 seconds, normal  Peripheral Pulses: +2 palpable, equal bilaterally     Labs:     Recent Labs     02/18/22  1900   WBC 7.4   HGB 13.2*   HCT 38.6*        Recent Labs     02/18/22  1900      K 4.5      CO2 22 BUN 51*   CREATININE 4.7*   CALCIUM 9.3     Recent Labs     02/18/22  1900   AST 18   ALT 21   BILITOT <0.2   ALKPHOS 78     Recent Labs     02/18/22  1900   INR 0.94     Recent Labs     02/18/22 1900   TROPONINI <0.01     Urinalysis:      Lab Results   Component Value Date    NITRU Negative 02/18/2022    WBCUA 3-5 02/18/2022    BACTERIA Rare 02/18/2022    RBCUA 11-20 02/18/2022    BLOODU MODERATE 02/18/2022    SPECGRAV 1.020 02/18/2022    GLUCOSEU Negative 02/18/2022    GLUCOSEU NEGATIVE 09/21/2011     Radiology: All radiographs: I have reviewed the radiographic reports from tonight's encounter with the following interpretations; see report below:    CT CHEST ABDOMEN PELVIS WO CONTRAST   Final Result   CT of the chest:      CT of the abdomen and pelvis:      Interval development of enhancing mass lesion within the anterior aspect of   superior pole of the left kidney which was not present on prior examination   measuring 4.8 x 4.9 cm concerning for renal cell carcinoma. Very ill-defined hypodense mass lesion is noted within segment 4A of the   liver measuring 3.4 x 3.3 cm. Finding was not present on prior examination   and may represent unusual fat deposition or may represent other type of   infiltrative liver mass lesions. Metastatic disease cannot be excluded. .   The lesion does not have appearance of cyst or hemangioma. Polycystic kidney disease. The kidneys are enlarged with multiple cysts. Unchanged scattered small hepatic cysts. CT Head WO Contrast   Final Result   1. No acute intracranial abnormality.    2. Stable small right-sided arachnoid cyst.           EKG:  I have reviewed the EKG with the following interpretation: NSR, 85 bpm, intervals normal, no ST-segment or T-wave changes, normal EKG    ASSESSMENT:    Active Hospital Problems    Diagnosis Date Noted    Abdominal pain [R10.9] 02/18/2022     Priority: High    Uncontrolled hypertension [I10] 02/18/2022     Priority: High    Headache [R51.9] 02/18/2022     Priority: High    Chronic kidney disease [N18.9] 02/18/2022     Priority: High     PLAN:    1.) Uncontrolled hypertension  - Continue home medications  - Continue PRN clonidine, add labetalol as well for target BP <160/99  - Consult cardiology  - Order echocardiogram    2.) Polycystic kidney disease  - CT findings concerning for evolving lesion suspicious of renal cell carcinoma  - Consult nephrology    3.) Abdominal pain  - History of scattered cystic lesions found on CT which is noted again tonight  - New mass lesion found  - Order abdominal US    4.) Hyperlipidemia  - Continue statin therapy    DVT Prophylaxis: Lovenox  Diet: Diet NPO  Code Status: Full Code    PT/OT Eval Status: Not indicated at this time    Dispo - 2-3 days per clinical  improvement       ANA Trujillo - CNP    Thank you ANA Calderon CNP for the opportunity to be involved in this patient's care.  If you have any questions or concerns please feel free to contact me at [phone].------Anticipated co-signer, Dr. Marcos Tadeo

## 2022-02-19 NOTE — CONSULTS
[email or web address]  Nephrology Consult Note           Reason for Consult:  CKD V and HTN   Requesting Physician:  Dr. Marlyn Mora    Chief Complaint:    Chief Complaint   Patient presents with    Headache     tuesday began with a headache, constant, pt came in due to elevated bp.  pt denies blurry vision. + dizziness.  Hematuria     pt has + blood in urine. he reports he is on kidney transplant list, he feels blood is worse right now. History of Present Illness on 2/19/22:    37 y.o. yo male with PMH of PCKD w brain aneyurysm, CKD V,HTN  who is admitted for HTN , uncontrolled  He had been having headache w dizziness , abd pain and elevated BP and was asked to come to ED by his PCP  BP was 170s/120s on arrival  Dr Chantal Reyes follows him and he is on transplant list but d/t his brain aneurysm, it has been on hold. Repair is planned for 4/8/22    Past Medical History:        Diagnosis Date    ADD (attention deficit disorder with hyperactivity)     Hyperlipidemia     Hypertension     PKD (polycystic kidney disease)     Renal disease     polycytic       Past Surgical History:        Procedure Laterality Date    HERNIA REPAIR      OTHER SURGICAL HISTORY Bilateral 5/15/13    BILATERAL LAPAROSCOPIC PREPERITONEAL INGUINAL HERNIA REPAIR    UMBILICAL HERNIA REPAIR N/A 0/27/3320    OPEN UMBILICAL HERNIA REPAIR performed by Jose M Benoit MD at [facility]  3/15/2011    VASECTOMY      WISDOM TOOTH EXTRACTION         Home Medications:    No current facility-administered medications on file prior to encounter.      Current Outpatient Medications on File Prior to Encounter   Medication Sig Dispense Refill    omeprazole (PRILOSEC) 40 MG delayed release capsule TAKE 1 CAPSULE BY MOUTH EVERY DAY IN THE MORNING BEFORE BREAKFAST 90 capsule 1    citalopram (CELEXA) 10 MG tablet TAKE 1 TABLET BY MOUTH EVERY DAY 90 tablet 1    simvastatin (ZOCOR) 40 MG tablet TAKE 1 TABLET BY MOUTH EVERY DAY AT NIGHT 90 tablet 1    amLODIPine (NORVASC) 10 MG tablet TAKE 1 TABLET BY MOUTH EVERY DAY 90 tablet 1    cloNIDine (CATAPRES) 0.3 MG/24HR PTWK PLACE 1 PATCH ONTO THE SKIN EVERY 7 DAYS.  allopurinol (ZYLOPRIM) 100 MG tablet TAKE 1 TABLET BY MOUTH EVERY DAY 90 tablet 1       Allergies:  Bee pollen, Hydrocodone, Iodine, Nsaids, Peanut-containing drug products, and Shellfish-derived products    Social History:    Social History     Socioeconomic History    Marital status:      Spouse name: Not on file    Number of children: Not on file    Years of education: Not on file    Highest education level: Not on file   Occupational History    Not on file   Tobacco Use    Smoking status: Former Smoker     Packs/day: 0.25     Years: 26.00     Pack years: 6.50     Types: Cigarettes    Smokeless tobacco: Never Used    Tobacco comment: 1 pack per month   Vaping Use    Vaping Use: Never used   Substance and Sexual Activity    Alcohol use: Yes     Comment: rarely    Drug use: Not Currently    Sexual activity: Yes     Partners: Female   Other Topics Concern    Not on file   Social History Narrative    Not on file     Social Determinants of Health     Financial Resource Strain: Low Risk     Difficulty of Paying Living Expenses: Not hard at all   Food Insecurity: No Food Insecurity    Worried About Running Out of Food in the Last Year: Never true    Naomi of Food in the Last Year: Never true   Transportation Needs: No Transportation Needs    Lack of Transportation (Medical): No    Lack of Transportation (Non-Medical):  No   Physical Activity:     Days of Exercise per Week: Not on file    Minutes of Exercise per Session: Not on file   Stress:     Feeling of Stress : Not on file   Social Connections:     Frequency of Communication with Friends and Family: Not on file    Frequency of Social Gatherings with Friends and Family: Not on file    Attends [employer] Services: Not on file   Shiv Linn Active Member of Clubs or Organizations: Not on file    Attends Club or Organization Meetings: Not on file    Marital Status: Not on file   Intimate Partner Violence:     Fear of Current or Ex-Partner: Not on file    Emotionally Abused: Not on file    Physically Abused: Not on file    Sexually Abused: Not on file   Housing Stability: Unknown    Unable to Pay for Housing in the Last Year: No    Number of [place] in the Last Year: Not on file    Unstable Housing in the Last Year: No       Family History:   Family History   Problem Relation Age of Onset    Kidney Disease Mother     Heart Disease Mother         CHF    High Blood Pressure Sister     High Cholesterol Sister     Other Sister         PKD    High Blood Pressure Brother     High Cholesterol Brother     Other Brother         PKD       Review of Systems:   Pertinent positives stated above in HPI. All other 10 systems were reviewed and were negative. Physical exam:   Constitutional:  VITALS:  BP (!) 153/105   Pulse 63   Temp 99.2 °F (37.3 °C) (Oral)   Resp 16   Wt 214 lb (97.1 kg)   SpO2 97%   BMI 29.02 kg/m²   Gen: alert, awake  Neck: No JVD  Skin: Unremarkable  Cardiovascular:  S1, S2 without m/r/g   Respiratory: CTA B without w/r/r; respiratory effort normal  Abdomen:  soft, nt, nd,   Extremities: no lower extremity edema  Neuro/Psy: AAoriented times 3 ; moves all 4 ext    Data/  Recent Labs     02/18/22  1900 02/19/22  0549   WBC 7.4 6.1   HGB 13.2* 12.6*   HCT 38.6* 36.6*   MCV 81.6 81.6    259     Recent Labs     02/18/22  1900 02/19/22  0549    141   K 4.5 4.1    104   CO2 22 22   GLUCOSE 97 107*   BUN 51* 51*   CREATININE 4.7* 4.5*   LABGLOM 14* 15*   GFRAA 17* 18*     Cerebral angio on 12/14/21  1. The patient's known distal sidewall aneurysm within V4 division of the left vertebral artery is again demonstrated on the current study.  The aneurysm measures ~5.4 in the neck-to-dome length by ~3.9 mm in maximal width. The neck is discreet and well-defined, measuring ~3.3 mm in width. CT chest a/p wo contrast  Impression   CT of the chest:       CT of the abdomen and pelvis:       Interval development of enhancing mass lesion within the anterior aspect of   superior pole of the left kidney which was not present on prior examination   measuring 4.8 x 4.9 cm concerning for renal cell carcinoma.       Very ill-defined hypodense mass lesion is noted within segment 4A of the   liver measuring 3.4 x 3.3 cm.  Finding was not present on prior examination   and may represent unusual fat deposition or may represent other type of   infiltrative liver mass lesions.  Metastatic disease cannot be excluded. .   The lesion does not have appearance of cyst or hemangioma.       Polycystic kidney disease.  The kidneys are enlarged with multiple cysts.       Unchanged scattered small hepatic cysts.           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 Please do not hesitate to call with questions. Pradeep Gavin MD  Office: [phone]  Fax:    [phone] [address] [facility]

## 2022-02-20 NOTE — DISCHARGE SUMMARY
Hospital Medicine Discharge Summary    Patient ID: Jcarlos Magaña      Patient's PCP: Kirk Green, APRN - [facility]    Admit Date: 2/18/2022     Discharge Date: 2/19/2022      Admitting Physician: Abby Almazan MD     Discharge Physician: Ivy Wagner MD     Discharge Diagnoses: Active Hospital Problems    Diagnosis     Abdominal pain [R10.9]     Uncontrolled hypertension [I10]     Headache [R51.9]     Chronic kidney disease [N18.9]        The patient was seen and examined on day of discharge and this discharge summary is in conjunction with any daily progress note from day of discharge. Hospital Course:        HTN - w/out known CAD and no evidence of active signs/sxs of ischemia/failure. Uncontrolled on admission, currently improved on home meds w/ vitals reviewed and documented as above. PRN anti-HTN meds. Cardiology consulted and appreciated.      CKD - baseline stage 5. W/ Hx PCKD. Follow serial labs. Reviewed and documented as above. Nephrology consulted and appreciated. Followed outpt by Dr. Maryuri Landin on Transplant list.      Hx Brain Aneurysm - plan for repair 8 April 2022.      Abdominal pain - w/ Hx of scattered cystic lesions found on CT which is noted again. New L renal mass on admission CT scan w/ hematuria - possible Renal Cell CA. Abdominal U/S ordered. Urology consulted and appreciated.      HyperLipidemia - controlled on home Statin. Continue, w/ f/u and med adjustment w/ PCP        Labs:  For convenience and continuity at follow-up the following most recent labs are provided:      CBC:    Lab Results   Component Value Date    WBC 6.1 02/19/2022    HGB 12.6 02/19/2022    HCT 36.6 02/19/2022     02/19/2022       Renal:    Lab Results   Component Value Date     02/19/2022    K 4.1 02/19/2022     02/19/2022    CO2 22 02/19/2022    BUN 51 02/19/2022    CREATININE 4.5 02/19/2022    CALCIUM 9.4 02/19/2022    PHOS 3.9 01/25/2021         Significant Diagnostic Studies    Radiology:   US ABDOMEN COMPLETE   Preliminary Result   3.0 cm right hepatic cyst.  The left hepatic lesion noted on CT is not   clearly visible by ultrasound. Consider follow-up evaluation with MRI. Polycystic kidney disease. No obvious solid renal mass or significant   hydronephrosis. CT CHEST ABDOMEN PELVIS WO CONTRAST   Final Result   CT of the chest:      CT of the abdomen and pelvis:      Interval development of enhancing mass lesion within the anterior aspect of   superior pole of the left kidney which was not present on prior examination   measuring 4.8 x 4.9 cm concerning for renal cell carcinoma. Very ill-defined hypodense mass lesion is noted within segment 4A of the   liver measuring 3.4 x 3.3 cm. Finding was not present on prior examination   and may represent unusual fat deposition or may represent other type of   infiltrative liver mass lesions. Metastatic disease cannot be excluded. .   The lesion does not have appearance of cyst or hemangioma. Polycystic kidney disease. The kidneys are enlarged with multiple cysts. Unchanged scattered small hepatic cysts. CT Head WO Contrast   Final Result   1. No acute intracranial abnormality. 2. Stable small right-sided arachnoid cyst.                Consults:     IP CONSULT TO NEPHROLOGY  IP CONSULT TO HOSPITALIST  IP CONSULT TO NEPHROLOGY  IP CONSULT TO CARDIOLOGY  IP CONSULT TO DIETITIAN  IP CONSULT TO UROLOGY    Disposition: Home     Condition at Discharge: Stable    Discharge Instructions/Follow-up:  w/ PCP 1-2 weeks and subspecialists as arranged.      Code Status:  Full Code    Activity: activity as tolerated    Diet: regular diet      Discharge Medications:     Discharge Medication List as of 2/19/2022  3:36 PM           Details   NIFEdipine (PROCARDIA XL) 30 MG extended release tablet Take 2 tablets by mouth daily, Disp-60 tablet, R-0Print              Details   omeprazole (PRILOSEC) 40 MG delayed release capsule TAKE 1 CAPSULE BY MOUTH EVERY DAY IN THE MORNING BEFORE BREAKFAST, Disp-90 capsule, R-1Normal      citalopram (CELEXA) 10 MG tablet TAKE 1 TABLET BY MOUTH EVERY DAY, Disp-90 tablet, R-1Normal      simvastatin (ZOCOR) 40 MG tablet TAKE 1 TABLET BY MOUTH EVERY DAY AT NIGHT, Disp-90 tablet, R-1Normal      cloNIDine (CATAPRES) 0.3 MG/24HR PTWK PLACE 1 PATCH ONTO THE SKIN EVERY 7 DAYS. Historical Med      allopurinol (ZYLOPRIM) 100 MG tablet TAKE 1 TABLET BY MOUTH EVERY DAY, Disp-90 tablet, R-1Normal             Time Spent on discharge is more than 30 minutes in the examination, evaluation, counseling and review of medications and discharge plan. Signed:    Izzy Hernández MD   2/20/2022      Thank you ANA Martinez CNP for the opportunity to be involved in this patient's care. If you have any questions or concerns please feel free to contact me at [phone].

## 2022-04-29 ENCOUNTER — HOSPITAL ENCOUNTER (INPATIENT)
Age: 42
LOS: 5 days | Discharge: HOME | DRG: 682 | End: 2022-05-04
Attending: EMERGENCY MEDICINE | Admitting: INTERNAL MEDICINE
Payer: COMMERCIAL

## 2022-04-29 ENCOUNTER — APPOINTMENT (OUTPATIENT)
Dept: CT IMAGING | Age: 42
DRG: 682 | End: 2022-04-29
Payer: COMMERCIAL

## 2022-04-29 DIAGNOSIS — Q61.3 POLYCYSTIC KIDNEY DISEASE: ICD-10-CM

## 2022-04-29 DIAGNOSIS — D64.9 ANEMIA, UNSPECIFIED TYPE: ICD-10-CM

## 2022-04-29 DIAGNOSIS — R10.9 ACUTE RIGHT FLANK PAIN: ICD-10-CM

## 2022-04-29 DIAGNOSIS — N18.9 CHRONIC KIDNEY DISEASE, UNSPECIFIED CKD STAGE: ICD-10-CM

## 2022-04-29 DIAGNOSIS — N12 PYELONEPHRITIS: Primary | ICD-10-CM

## 2022-04-29 PROBLEM — D63.1 ANEMIA DUE TO STAGE 5 CHRONIC KIDNEY DISEASE, NOT ON CHRONIC DIALYSIS (HCC): Status: ACTIVE | Noted: 2022-02-18

## 2022-04-29 PROBLEM — N17.9 ACUTE KIDNEY INJURY SUPERIMPOSED ON CKD (HCC): Status: ACTIVE | Noted: 2022-04-29

## 2022-04-29 PROBLEM — N18.5 CHRONIC KIDNEY DISEASE (CKD), STAGE V (HCC): Status: ACTIVE | Noted: 2022-02-18

## 2022-04-29 LAB
A/G RATIO: 1.2 (ref 1.1–2.2)
ALBUMIN SERPL-MCNC: 4.6 G/DL (ref 3.4–5)
ALP BLD-CCNC: 84 U/L (ref 40–129)
ALT SERPL-CCNC: 11 U/L (ref 10–40)
ANION GAP SERPL CALCULATED.3IONS-SCNC: 18 MMOL/L (ref 3–16)
AST SERPL-CCNC: 12 U/L (ref 15–37)
BASOPHILS ABSOLUTE: 0.1 K/UL (ref 0–0.2)
BASOPHILS RELATIVE PERCENT: 0.5 %
BILIRUB SERPL-MCNC: <0.2 MG/DL (ref 0–1)
BILIRUBIN URINE: NEGATIVE
BLOOD, URINE: ABNORMAL
BUN BLDV-MCNC: 50 MG/DL (ref 7–20)
CALCIUM SERPL-MCNC: 10.2 MG/DL (ref 8.3–10.6)
CHLORIDE BLD-SCNC: 95 MMOL/L (ref 99–110)
CLARITY: CLEAR
CO2: 20 MMOL/L (ref 21–32)
COLOR: ABNORMAL
COMMENT UA: ABNORMAL
CREAT SERPL-MCNC: 4.8 MG/DL (ref 0.9–1.3)
EOSINOPHILS ABSOLUTE: 0 K/UL (ref 0–0.6)
EOSINOPHILS RELATIVE PERCENT: 0.4 %
GFR AFRICAN AMERICAN: 16
GFR NON-AFRICAN AMERICAN: 13
GLUCOSE BLD-MCNC: 124 MG/DL (ref 70–99)
GLUCOSE URINE: 100 MG/DL
HCT VFR BLD CALC: 35 % (ref 40.5–52.5)
HEMOGLOBIN: 11.7 G/DL (ref 13.5–17.5)
KETONES, URINE: NEGATIVE MG/DL
LACTIC ACID, SEPSIS: 1 MMOL/L (ref 0.4–1.9)
LEUKOCYTE ESTERASE, URINE: ABNORMAL
LYMPHOCYTES ABSOLUTE: 1.3 K/UL (ref 1–5.1)
LYMPHOCYTES RELATIVE PERCENT: 11.6 %
MCH RBC QN AUTO: 28.6 PG (ref 26–34)
MCHC RBC AUTO-ENTMCNC: 33.4 G/DL (ref 31–36)
MCV RBC AUTO: 85.8 FL (ref 80–100)
MICROSCOPIC EXAMINATION: YES
MONOCYTES ABSOLUTE: 0.9 K/UL (ref 0–1.3)
MONOCYTES RELATIVE PERCENT: 8 %
NEUTROPHILS ABSOLUTE: 9.2 K/UL (ref 1.7–7.7)
NEUTROPHILS RELATIVE PERCENT: 79.5 %
NITRITE, URINE: NEGATIVE
PDW BLD-RTO: 13.9 % (ref 12.4–15.4)
PH UA: 7 (ref 5–8)
PLATELET # BLD: 339 K/UL (ref 135–450)
PMV BLD AUTO: 6.9 FL (ref 5–10.5)
POTASSIUM SERPL-SCNC: 3.5 MMOL/L (ref 3.5–5.1)
PROCALCITONIN: 0.32 NG/ML (ref 0–0.15)
PROTEIN UA: >=300 MG/DL
RBC # BLD: 4.08 M/UL (ref 4.2–5.9)
RBC UA: ABNORMAL /HPF (ref 0–4)
SODIUM BLD-SCNC: 133 MMOL/L (ref 136–145)
SPECIFIC GRAVITY UA: 1.01 (ref 1–1.03)
TOTAL PROTEIN: 8.5 G/DL (ref 6.4–8.2)
URINE REFLEX TO CULTURE: ABNORMAL
URINE TYPE: ABNORMAL
UROBILINOGEN, URINE: 0.2 E.U./DL
WBC # BLD: 11.5 K/UL (ref 4–11)
WBC UA: ABNORMAL /HPF (ref 0–5)

## 2022-04-29 PROCEDURE — 6360000002 HC RX W HCPCS

## 2022-04-29 PROCEDURE — 87086 URINE CULTURE/COLONY COUNT: CPT

## 2022-04-29 PROCEDURE — 2500000003 HC RX 250 WO HCPCS

## 2022-04-29 PROCEDURE — 81001 URINALYSIS AUTO W/SCOPE: CPT

## 2022-04-29 PROCEDURE — 99285 EMERGENCY DEPT VISIT HI MDM: CPT

## 2022-04-29 PROCEDURE — 84145 PROCALCITONIN (PCT): CPT

## 2022-04-29 PROCEDURE — 96375 TX/PRO/DX INJ NEW DRUG ADDON: CPT

## 2022-04-29 PROCEDURE — 80053 COMPREHEN METABOLIC PANEL: CPT

## 2022-04-29 PROCEDURE — 83605 ASSAY OF LACTIC ACID: CPT

## 2022-04-29 PROCEDURE — 6370000000 HC RX 637 (ALT 250 FOR IP)

## 2022-04-29 PROCEDURE — 6370000000 HC RX 637 (ALT 250 FOR IP): Performed by: NURSE PRACTITIONER

## 2022-04-29 PROCEDURE — 2580000003 HC RX 258

## 2022-04-29 PROCEDURE — 96376 TX/PRO/DX INJ SAME DRUG ADON: CPT

## 2022-04-29 PROCEDURE — 6370000000 HC RX 637 (ALT 250 FOR IP): Performed by: INTERNAL MEDICINE

## 2022-04-29 PROCEDURE — 85025 COMPLETE CBC W/AUTO DIFF WBC: CPT

## 2022-04-29 PROCEDURE — 6370000000 HC RX 637 (ALT 250 FOR IP): Performed by: EMERGENCY MEDICINE

## 2022-04-29 PROCEDURE — 2580000003 HC RX 258: Performed by: EMERGENCY MEDICINE

## 2022-04-29 PROCEDURE — 6360000002 HC RX W HCPCS: Performed by: EMERGENCY MEDICINE

## 2022-04-29 PROCEDURE — 1200000000 HC SEMI PRIVATE

## 2022-04-29 PROCEDURE — 74176 CT ABD & PELVIS W/O CONTRAST: CPT

## 2022-04-29 PROCEDURE — 87040 BLOOD CULTURE FOR BACTERIA: CPT

## 2022-04-29 PROCEDURE — 96365 THER/PROPH/DIAG IV INF INIT: CPT

## 2022-04-29 RX ORDER — MORPHINE SULFATE 4 MG/ML
4 INJECTION, SOLUTION INTRAMUSCULAR; INTRAVENOUS ONCE
Status: COMPLETED | OUTPATIENT
Start: 2022-04-29 | End: 2022-04-29

## 2022-04-29 RX ORDER — ATORVASTATIN CALCIUM 10 MG/1
20 TABLET, FILM COATED ORAL NIGHTLY
Refills: 1 | Status: DISCONTINUED | OUTPATIENT
Start: 2022-04-29 | End: 2022-05-04 | Stop reason: HOSPADM

## 2022-04-29 RX ORDER — ALLOPURINOL 100 MG/1
100 TABLET ORAL DAILY
Status: DISCONTINUED | OUTPATIENT
Start: 2022-04-29 | End: 2022-05-04 | Stop reason: HOSPADM

## 2022-04-29 RX ORDER — CALCIUM CARBONATE 200(500)MG
500 TABLET,CHEWABLE ORAL 3 TIMES DAILY PRN
Status: DISCONTINUED | OUTPATIENT
Start: 2022-04-29 | End: 2022-05-04 | Stop reason: HOSPADM

## 2022-04-29 RX ORDER — CITALOPRAM 20 MG/1
10 TABLET ORAL DAILY
Status: DISCONTINUED | OUTPATIENT
Start: 2022-04-29 | End: 2022-05-04 | Stop reason: HOSPADM

## 2022-04-29 RX ORDER — ONDANSETRON 2 MG/ML
4 INJECTION INTRAMUSCULAR; INTRAVENOUS EVERY 6 HOURS PRN
Status: DISCONTINUED | OUTPATIENT
Start: 2022-04-29 | End: 2022-05-04 | Stop reason: HOSPADM

## 2022-04-29 RX ORDER — ENOXAPARIN SODIUM 100 MG/ML
30 INJECTION SUBCUTANEOUS DAILY
Status: DISCONTINUED | OUTPATIENT
Start: 2022-04-29 | End: 2022-04-29

## 2022-04-29 RX ORDER — SODIUM CHLORIDE 9 MG/ML
INJECTION, SOLUTION INTRAVENOUS PRN
Status: DISCONTINUED | OUTPATIENT
Start: 2022-04-29 | End: 2022-05-04 | Stop reason: HOSPADM

## 2022-04-29 RX ORDER — LABETALOL HYDROCHLORIDE 5 MG/ML
20 INJECTION, SOLUTION INTRAVENOUS
Status: DISCONTINUED | OUTPATIENT
Start: 2022-04-29 | End: 2022-05-04 | Stop reason: HOSPADM

## 2022-04-29 RX ORDER — POLYETHYLENE GLYCOL 3350 17 G/17G
17 POWDER, FOR SOLUTION ORAL DAILY PRN
Status: DISCONTINUED | OUTPATIENT
Start: 2022-04-29 | End: 2022-05-03

## 2022-04-29 RX ORDER — MORPHINE SULFATE 2 MG/ML
2 INJECTION, SOLUTION INTRAMUSCULAR; INTRAVENOUS EVERY 4 HOURS PRN
Status: DISCONTINUED | OUTPATIENT
Start: 2022-04-29 | End: 2022-05-03

## 2022-04-29 RX ORDER — ACETAMINOPHEN 325 MG/1
650 TABLET ORAL EVERY 6 HOURS PRN
Status: DISCONTINUED | OUTPATIENT
Start: 2022-04-29 | End: 2022-05-04 | Stop reason: HOSPADM

## 2022-04-29 RX ORDER — ONDANSETRON 4 MG/1
4 TABLET, ORALLY DISINTEGRATING ORAL EVERY 8 HOURS PRN
Status: DISCONTINUED | OUTPATIENT
Start: 2022-04-29 | End: 2022-05-04 | Stop reason: HOSPADM

## 2022-04-29 RX ORDER — NIFEDIPINE 30 MG/1
30 TABLET, EXTENDED RELEASE ORAL DAILY
Status: DISCONTINUED | OUTPATIENT
Start: 2022-04-29 | End: 2022-04-29

## 2022-04-29 RX ORDER — ACETAMINOPHEN 650 MG/1
650 SUPPOSITORY RECTAL EVERY 6 HOURS PRN
Status: DISCONTINUED | OUTPATIENT
Start: 2022-04-29 | End: 2022-05-04 | Stop reason: HOSPADM

## 2022-04-29 RX ORDER — SODIUM CHLORIDE 0.9 % (FLUSH) 0.9 %
5-40 SYRINGE (ML) INJECTION EVERY 12 HOURS SCHEDULED
Status: DISCONTINUED | OUTPATIENT
Start: 2022-04-29 | End: 2022-05-04 | Stop reason: HOSPADM

## 2022-04-29 RX ORDER — LABETALOL HYDROCHLORIDE 5 MG/ML
10 INJECTION, SOLUTION INTRAVENOUS EVERY 6 HOURS PRN
Status: DISCONTINUED | OUTPATIENT
Start: 2022-04-29 | End: 2022-04-29

## 2022-04-29 RX ORDER — ONDANSETRON 2 MG/ML
4 INJECTION INTRAMUSCULAR; INTRAVENOUS ONCE
Status: COMPLETED | OUTPATIENT
Start: 2022-04-29 | End: 2022-04-29

## 2022-04-29 RX ORDER — HEPARIN SODIUM 5000 [USP'U]/ML
5000 INJECTION, SOLUTION INTRAVENOUS; SUBCUTANEOUS 2 TIMES DAILY
Status: DISCONTINUED | OUTPATIENT
Start: 2022-04-29 | End: 2022-04-29

## 2022-04-29 RX ORDER — CLONIDINE HYDROCHLORIDE 0.1 MG/1
0.3 TABLET ORAL 3 TIMES DAILY
Status: DISCONTINUED | OUTPATIENT
Start: 2022-04-29 | End: 2022-05-04 | Stop reason: HOSPADM

## 2022-04-29 RX ORDER — NIFEDIPINE 30 MG/1
60 TABLET, EXTENDED RELEASE ORAL DAILY
Status: DISCONTINUED | OUTPATIENT
Start: 2022-04-30 | End: 2022-05-04 | Stop reason: HOSPADM

## 2022-04-29 RX ORDER — PANTOPRAZOLE SODIUM 40 MG/1
40 TABLET, DELAYED RELEASE ORAL
Status: DISCONTINUED | OUTPATIENT
Start: 2022-04-29 | End: 2022-05-04 | Stop reason: HOSPADM

## 2022-04-29 RX ORDER — SODIUM CHLORIDE 0.9 % (FLUSH) 0.9 %
5-40 SYRINGE (ML) INJECTION PRN
Status: DISCONTINUED | OUTPATIENT
Start: 2022-04-29 | End: 2022-05-04 | Stop reason: HOSPADM

## 2022-04-29 RX ADMIN — MORPHINE SULFATE 2 MG: 2 INJECTION, SOLUTION INTRAMUSCULAR; INTRAVENOUS at 21:50

## 2022-04-29 RX ADMIN — LABETALOL HYDROCHLORIDE 20 MG: 5 INJECTION INTRAVENOUS at 20:22

## 2022-04-29 RX ADMIN — ANTACID TABLETS 500 MG: 500 TABLET, CHEWABLE ORAL at 21:50

## 2022-04-29 RX ADMIN — CLONIDINE HYDROCHLORIDE 0.3 MG: 0.1 TABLET ORAL at 20:16

## 2022-04-29 RX ADMIN — ALLOPURINOL 100 MG: 100 TABLET ORAL at 17:11

## 2022-04-29 RX ADMIN — PANTOPRAZOLE SODIUM 40 MG: 40 TABLET, DELAYED RELEASE ORAL at 17:11

## 2022-04-29 RX ADMIN — MORPHINE SULFATE 2 MG: 2 INJECTION, SOLUTION INTRAMUSCULAR; INTRAVENOUS at 17:09

## 2022-04-29 RX ADMIN — NIFEDIPINE 30 MG: 30 TABLET, FILM COATED, EXTENDED RELEASE ORAL at 10:04

## 2022-04-29 RX ADMIN — ATORVASTATIN CALCIUM 20 MG: 10 TABLET, FILM COATED ORAL at 20:17

## 2022-04-29 RX ADMIN — CEFTRIAXONE SODIUM 1000 MG: 1 INJECTION, POWDER, FOR SOLUTION INTRAMUSCULAR; INTRAVENOUS at 10:05

## 2022-04-29 RX ADMIN — SODIUM CHLORIDE, PRESERVATIVE FREE 10 ML: 5 INJECTION INTRAVENOUS at 20:17

## 2022-04-29 RX ADMIN — ONDANSETRON 4 MG: 2 INJECTION INTRAMUSCULAR; INTRAVENOUS at 07:43

## 2022-04-29 RX ADMIN — CITALOPRAM HYDROBROMIDE 10 MG: 20 TABLET ORAL at 17:11

## 2022-04-29 RX ADMIN — MORPHINE SULFATE 4 MG: 4 INJECTION, SOLUTION INTRAMUSCULAR; INTRAVENOUS at 13:24

## 2022-04-29 RX ADMIN — ONDANSETRON 4 MG: 2 INJECTION INTRAMUSCULAR; INTRAVENOUS at 15:48

## 2022-04-29 RX ADMIN — MORPHINE SULFATE 4 MG: 4 INJECTION, SOLUTION INTRAMUSCULAR; INTRAVENOUS at 07:43

## 2022-04-29 RX ADMIN — MORPHINE SULFATE 4 MG: 4 INJECTION, SOLUTION INTRAMUSCULAR; INTRAVENOUS at 11:06

## 2022-04-29 RX ADMIN — LABETALOL HYDROCHLORIDE 10 MG: 5 INJECTION INTRAVENOUS at 15:53

## 2022-04-29 ASSESSMENT — ENCOUNTER SYMPTOMS
DIARRHEA: 0
NAUSEA: 0
ABDOMINAL PAIN: 0
VOMITING: 0
BACK PAIN: 0
COUGH: 0
SHORTNESS OF BREATH: 0
WHEEZING: 0
RHINORRHEA: 0
PHOTOPHOBIA: 0

## 2022-04-29 ASSESSMENT — PAIN DESCRIPTION - LOCATION
LOCATION: FLANK

## 2022-04-29 ASSESSMENT — PAIN DESCRIPTION - ORIENTATION
ORIENTATION: RIGHT

## 2022-04-29 ASSESSMENT — PAIN DESCRIPTION - DESCRIPTORS
DESCRIPTORS: SHARP
DESCRIPTORS: STABBING

## 2022-04-29 ASSESSMENT — PAIN SCALES - GENERAL
PAINLEVEL_OUTOF10: 8
PAINLEVEL_OUTOF10: 7
PAINLEVEL_OUTOF10: 8
PAINLEVEL_OUTOF10: 8
PAINLEVEL_OUTOF10: 7
PAINLEVEL_OUTOF10: 8
PAINLEVEL_OUTOF10: 8

## 2022-04-29 ASSESSMENT — PAIN DESCRIPTION - FREQUENCY
FREQUENCY: CONTINUOUS
FREQUENCY: CONTINUOUS

## 2022-04-29 ASSESSMENT — PAIN - FUNCTIONAL ASSESSMENT: PAIN_FUNCTIONAL_ASSESSMENT: 0-10

## 2022-04-29 NOTE — EC ADMISSION CRITERIA
AdmissionCare    Guideline: Urinary Tract Infection (UTI), Inpatient    Based on the indications selected for the patient, the bed status of Admit to Inpatient was determined to be MET    The following indications were selected as present at the time of evaluation of the patient:   - Admission is indicated for    - Hemodynamic instability, as indicated by:     - Vital sign abnormality not readily corrected by appropriate treatment, as indicated by:      - Tachycardia that persists despite appropriate treatment (eg, volume repletion, treatment of pain, treatment of underlying cause)    - Persistence or worsening of clinical finding (eg, fever, pain, dehydration, vomiting) despite observation care   - Significant clinical findings, as indicated by:    - Clinically significant metabolic abnormality (eg, metabolic acidosis) that is severe or persists despite appropriate treatment    AdmissionCare documentation entered by: [address] [place], 26th edition, Copyright © 2022 [address] All Rights Reserved.  [id number]:22:22-04:00

## 2022-04-29 NOTE — ED NOTES
3122 - called [facility] per consult  Re: PCKD flank pain, hematuria  0908 - Dr Arron Amador called back to speak with Dr Ismael Hsieh  2320 - called [facility] pt placement to initiate tranfer. Spoke with Mevrin Mendoza to reach hospitalist.   Re: pt on renal transplant list with [address] called back with [facility]'s admitting attending Dr Author Barnett on the line to speak with Dr Ismael Hsieh. Dr Scott Armijo will call back in a bit in regards to pt's transfer  46 - Dr Scott Armijo called back again to speak with Dr Ismael Hsieh.  Dr Scott Armijo did not accept pt   21  - called [facility] to notify Dr Arron Amador that pt wasn't accepted for transfer and that pt will have to be admitted to [address] [place] [address]  04/29/22 1052

## 2022-04-29 NOTE — PROGRESS NOTES
Perfect serve sent to team at this time :       Lia Bhatt, pt is here from ED, he says he had a brain aneurysm coil placed about 2 weeks ago, so he was concerns about taking the lovenox ordered, also, pt has a clonidine ptch that he placed on Sunday, should we remove?       Awaiting response

## 2022-04-29 NOTE — PROGRESS NOTES
Pt /145, IV labetalol given per order, pt repeat /137, will check again in 15 mins. Pt denies any dizziness or headaches related to high blood pressure.

## 2022-04-29 NOTE — PROGRESS NOTES
[phone] -Call placed to [facility] and page sent out to Dr. Leora Yates.   RE: CKD and uncontrolled HTN

## 2022-04-29 NOTE — PROGRESS NOTES
Perfect serve sent to team to see about ordering tele since BP elevated and needing IV medications to help control, awaiting response       UPDATE: pt placed on [facility] tele

## 2022-04-29 NOTE — H&P
History & Physical      PCP: ANA Whittington - CNP    Date of Admission: 4/29/2022    Date of Service: Pt seen/examined on 4/29/2022 and Admitted to Inpatient with expected LOS greater than two midnights due to medical therapy. Chief Complaint:    Patient presents with    Flank Pain     pt reports he has chronic kidney disease and has been taking ibuprofen lately. states he has right flank pain and is now urinating blood. History Of Present Illness:    Fiona Swenson is a 37 y.o. male who presented to [facility] with right-sided flank pain and gross hematuria. Patient has medical history significant for CKD Stage V secondary to polycystic kidney disease awaiting transplant with [facility], HTN, HLD, GERD and s/p coiling of intracranial aneurysm on 4/15/2022. Since the procedure, patient has had severe left-sided neck pain which he has attributed to lying on that side for too long. He was recently in the ED at [facility] concerning this pain. Patient has tried pain control with acetaminophen, but has been without relief. He reports over the last week he has been taking ibuprofen, about 2-4 200-mg tablets every 4 hours for the last week for pain relief. He admits that he knows he should not take it with his CKD, but it was the only medication that would give him pain relief. Right-sided flank pain started last night, characterizes as a sharp, stabbing pain. Pain radiates over the front of his abdomen. Pt states that his abdomen typically feels full due to his PCKD, but has had more pain. Pt states that he has had associated gross hematuria over the past day. Pt denies and dysuria or frequency. Has had some nausea he relates to pain, but no vomiting. Denies fever, chills, or changes with BMs.     ED Workup: Pt found to be very hypertensive, he reports his HTN was previously well controlled on his regimen, but since the surgery and the pain he has had, it has been uncontrolled in the 180s/120s. . Reports highest was noted yesterday at 201/140. Cr 4.8, but unsure of baseline with his disease. Last measured 2/19: 4.5. Insignificant leukocytosis at 11.5. UA with gross hematuria, proteinuria, and leukocyte esterase, but nitrite negative. Reflex to culture was not indicated, but this has been ordered due to patient's right flank pain. He received empiric Abx, ceftriaxone 1x. Past Medical History:          Diagnosis Date    ADD (attention deficit disorder with hyperactivity)     Hyperlipidemia     Hypertension     PKD (polycystic kidney disease)     Renal disease     polycytic       Past Surgical History:          Procedure Laterality Date    HERNIA REPAIR      OTHER SURGICAL HISTORY Bilateral 5/15/13    BILATERAL LAPAROSCOPIC PREPERITONEAL INGUINAL HERNIA REPAIR    UMBILICAL HERNIA REPAIR N/A 5/79/0465    OPEN UMBILICAL HERNIA REPAIR performed by Rainer Apple MD at [facility]  3/15/2011    VASECTOMY      WISDOM TOOTH EXTRACTION         Medications Prior to Admission:      Prior to Admission medications    Medication Sig Start Date End Date Taking?  Authorizing Provider   NIFEdipine (PROCARDIA XL) 30 MG extended release tablet Take 2 tablets by mouth daily 2/20/22 3/22/22  Amalia Franco MD   omeprazole (PRILOSEC) 40 MG delayed release capsule TAKE 1 CAPSULE BY MOUTH EVERY DAY IN THE MORNING BEFORE BREAKFAST 2/7/22   Faina Powers DO   citalopram (CELEXA) 10 MG tablet TAKE 1 TABLET BY MOUTH EVERY DAY 11/12/21   Faina Powers DO   simvastatin (ZOCOR) 40 MG tablet TAKE 1 TABLET BY MOUTH EVERY DAY AT NIGHT 10/27/21   Faina Powers DO   cloNIDine (CATAPRES) 0.3 MG/24HR PTWK PLACE 1 PATCH ONTO THE SKIN EVERY 7 DAYS. 4/1/21   Historical Provider, MD   allopurinol (ZYLOPRIM) 100 MG tablet TAKE 1 TABLET BY MOUTH EVERY DAY 4/23/21   Faina Powers DO       Allergies:  Bee pollen, Hydrocodone, Iodine, Nsaids, Peanut-containing drug products, and Shellfish-derived products    Social History:      The patient currently lives at home. TOBACCO:   reports that he has quit smoking. His smoking use included cigarettes. He has a 6.50 pack-year smoking history. He has never used smokeless tobacco.  ETOH:   reports current alcohol use. E-Cigarettes/Vaping Use     Questions Responses    E-Cigarette/Vaping Use Never User    Start Date     Passive Exposure     Quit Date     Counseling Given     Comments             Family History:      Reviewed in detail and negative for DM, CAD, Cancer, CVA. Positive as follows:        Problem Relation Age of Onset    Kidney Disease Mother     Heart Disease Mother         CHF    High Blood Pressure Sister     High Cholesterol Sister     Other Sister         PKD    High Blood Pressure Brother     High Cholesterol Brother     Other Brother         PKD       REVIEW OF SYSTEMS COMPLETED:   Pertinent positives as noted in the HPI. All other systems reviewed and negative. PHYSICAL EXAM PERFORMED:    BP (!) 180/116   Pulse 102   Temp 99.6 °F (37.6 °C) (Oral)   Resp 18   Ht 6' (1.829 m)   Wt 220 lb (99.8 kg)   SpO2 96%   BMI 29.84 kg/m²     General appearance:  No apparent distress, appears stated age and cooperative. HEENT:  Normal cephalic, atraumatic without obvious deformity. Pupils equal, round, and reactive to light. Extra ocular muscles intact. Conjunctivae/corneas clear. Neck: Supple, with full range of motion. No jugular venous distention. Trachea midline. Respiratory:  Normal respiratory effort. Clear to auscultation, bilaterally without Rales/Wheezes/Rhonchi. Cardiovascular:  Regular rate and rhythm with normal S1/S2 without murmurs, rubs or gallops. Abdomen: Soft, tender. Masses noted: kidneys are palpable anteriorly. Significant right-sided CVA tenderness. Musculoskeletal:  No clubbing, cyanosis or edema bilaterally. Full range of motion without deformity.   Skin: Skin color, texture, turgor normal.  No rashes or lesions. Neurologic:  Neurovascularly intact without any focal sensory/motor deficits. Cranial nerves: II-XII intact, grossly non-focal.  Psychiatric:  Alert and oriented, thought content appropriate, normal insight  Capillary Refill: Brisk,3 seconds, normal  Peripheral Pulses: +2 palpable, equal bilaterally       Labs:     Recent Labs     04/29/22  0649   WBC 11.5*   HGB 11.7*   HCT 35.0*        Recent Labs     04/29/22  0649   *   K 3.5   CL 95*   CO2 20*   BUN 50*   CREATININE 4.8*   CALCIUM 10.2     Recent Labs     04/29/22  0649   AST 12*   ALT 11   BILITOT <0.2   ALKPHOS 84     No results for input(s): INR in the last 72 hours. No results for input(s): Braden Shivam in the last 72 hours. Urinalysis:      Lab Results   Component Value Date    NITRU Negative 04/29/2022    WBCUA see below 04/29/2022    BACTERIA Rare 02/18/2022    RBCUA see below 04/29/2022    BLOODU LARGE 04/29/2022    SPECGRAV 1.015 04/29/2022    GLUCOSEU 100 04/29/2022    GLUCOSEU NEGATIVE 09/21/2011       Radiology:     CXR: I have reviewed the CXR with the following interpretation: none  EKG:  I have reviewed the EKG with the following interpretation: none    CT ABDOMEN PELVIS WO CONTRAST Additional Contrast? None   Final Result   1. Unchanged polycystic kidney disease with a stable 4.9 cm left renal mass,   again concerning for renal cell carcinoma. 2. Diverticulosis.              ASSESSMENT:    Active Hospital Problems    Diagnosis Date Noted    Pyelonephritis [N12] 04/29/2022     Priority: Medium    Polycystic kidney disease [Q61.3] 04/29/2022     Priority: Medium    Acute kidney injury superimposed on CKD ([facility] [N17.9, N18.9] 04/29/2022     Priority: Medium    Uncontrolled hypertension [I10] 02/18/2022    Anemia due to stage 5 chronic kidney disease, not on chronic dialysis ([facility] [N18.5, D63.1] 02/18/2022         PLAN:  Trevin Rubio is a 37 y.o. male who presented to [facility] with right-sided flank pain and gross hematuria. Patient has medical history significant for CKD Stage V 2/2 polycystic kidney disease awaiting transplant, HTN, HLD, GERD, and s/p coiling of intracranial aneurysm on 4/15/2022. Pyelonephritis,   DAMASO on CKD Stage V with PCKD  Anemia due to CKD  - Though no severe leukocytosis, physical exam concerning for pyelonephritis. Pt started empiric abx, cetriaxone q 24 hrs  - Blood cultures pending, Urine culture to be collected as UA did not reflex automatically. Lactic acid 1.0  - Pain management with morphine 2 mg q 2 hrs prn  - Management limited due to CKD and ineffectiveness of acetaminophen  - DVT prophylaxis will be with heparin in place of lovenox  - ctm CBC for anemia and platelets    Uncontrolled HTN  - Likely related to pain post-operatively  - Continue home nifedipine 60 mg daily and clonidine patch weekly  - Clonidine patch last changed Sunday  - Prn labetalol available for SBP > 170 and DBP > 100    HLD  - Continue statin, supplemented atorvastatin in place of simvastatin    GERD  - Continue PPI, supplemented pantoprazole in place of omeprazole    Gout  - Continue allopurinol    DVT Prophylaxis: heparin  Diet: ADULT DIET; Regular; Low Phosphorus (Less than 1000 mg)  Code Status: Full Code    PT/OT Eval Status: Not ordered    Dispo - 2-3 days      Jeannette Magdaleno MD   PGY-1    Thank you ANA Whittington - ANNE for the opportunity to be involved in this patient's care. If you have any questions or concerns please feel free to contact me at [phone].

## 2022-04-29 NOTE — ED PROVIDER NOTES
Emergency Department Provider Note  Location: [facility] C5 - MED SURG/ORTHO  4/29/2022     Patient Identification  Fiona Swenson is a 37 y.o. male    Chief Complaint  Flank Pain (pt reports he has chronic kidney disease and has been taking ibuprofen lately. states he has right flank pain and is now urinating blood. )          HPI  (History provided by patient)  Patient is a 45-year-old male with history of CKD 5, polycystic kidney disease awaiting transplant who presents with right-sided flank pain and gross hematuria over the past 24 hours. Patient reports that he recently had a brain aneurysm coiled 2 weeks ago and since then has had persistent left-sided trapezius pain from lying in 1 position for too long. He has been taking ibuprofen which he reports he knows he is not supposed to do. Woke up this morning with constant achy pain right flank and having gross hematuria. There is no dysuria or frequency. No exacerbating or alleviating factors. No nausea vomiting fevers chills diarrhea or other abdominal pain. I have reviewed the following nursing documentation:  Allergies: Allergies   Allergen Reactions    Bee Pollen     Hydrocodone Itching    Iodine Swelling    Nsaids      Does not take due to kidney function issues    Peanut-Containing Drug Products      [place] tree dust    [employer]        Past medical history:  has a past medical history of ADD (attention deficit disorder with hyperactivity), Hyperlipidemia, Hypertension, PKD (polycystic kidney disease), and Renal disease. Past surgical history:  has a past surgical history that includes Upper gastrointestinal endoscopy (3/15/2011); Vasectomy; [place] tooth extraction; other surgical history (Bilateral, 5/15/13); Umbilical hernia repair (N/A, 4/17/2019); and hernia repair. Home medications:   Prior to Admission medications    Medication Sig Start Date End Date Taking?  Authorizing Provider   NIFEdipine (PROCARDIA XL) 30 MG extended release tablet Take 2 tablets by mouth daily 2/20/22 3/22/22  Radha Guerra MD   omeprazole (PRILOSEC) 40 MG delayed release capsule TAKE 1 CAPSULE BY MOUTH EVERY DAY IN THE MORNING BEFORE BREAKFAST 2/7/22   Anahi Eagle DO   citalopram (CELEXA) 10 MG tablet TAKE 1 TABLET BY MOUTH EVERY DAY 11/12/21   Anahi Eagle DO   simvastatin (ZOCOR) 40 MG tablet TAKE 1 TABLET BY MOUTH EVERY DAY AT NIGHT 10/27/21   Anahi Eagle DO   cloNIDine (CATAPRES) 0.3 MG/24HR PTWK PLACE 1 PATCH ONTO THE SKIN EVERY 7 DAYS. 4/1/21   Historical Provider, MD   allopurinol (ZYLOPRIM) 100 MG tablet TAKE 1 TABLET BY MOUTH EVERY DAY 4/23/21   Anahi Eagle DO       Social history:  reports that he has quit smoking. His smoking use included cigarettes. He has a 6.50 pack-year smoking history. He has never used smokeless tobacco. He reports current alcohol use. He reports previous drug use. Family history:    Family History   Problem Relation Age of Onset    Kidney Disease Mother     Heart Disease Mother         CHF    High Blood Pressure Sister     High Cholesterol Sister     Other Sister         PKD    High Blood Pressure Brother     High Cholesterol Brother     Other Brother         PKD         ROS  Review of Systems   Constitutional: Negative for chills and fever. HENT: Negative for congestion and rhinorrhea. Eyes: Negative for photophobia and visual disturbance. Respiratory: Negative for cough, shortness of breath and wheezing. Cardiovascular: Negative for chest pain and palpitations. Gastrointestinal: Negative for abdominal pain, diarrhea, nausea and vomiting. Genitourinary: Positive for flank pain and hematuria. Negative for dysuria and frequency. Musculoskeletal: Negative for back pain and neck pain. Skin: Negative for rash and wound. Neurological: Negative for syncope and weakness. Psychiatric/Behavioral: Negative for agitation and confusion.          Exam  ED Triage Vitals [04/29/22 0645]   BP Temp Temp Source Pulse Resp SpO2 Height Weight   (!) 217/139 99.6 °F (37.6 °C) Oral 107 -- -- 6' (1.829 m) 220 lb (99.8 kg)       Physical Exam  Vitals and nursing note reviewed. Constitutional:       General: He is not in acute distress. Appearance: He is well-developed. HENT:      Head: Normocephalic and atraumatic. Nose: Nose normal. No congestion. Eyes:      General: No scleral icterus. Extraocular Movements: Extraocular movements intact. Cardiovascular:      Rate and Rhythm: Normal rate and regular rhythm. Heart sounds: No murmur heard. Pulmonary:      Effort: Pulmonary effort is normal.      Breath sounds: Normal breath sounds. Abdominal:      Palpations: Abdomen is soft. There is mass. Comments: Kidneys are palpable anteriorly. There is no significant abdominal tenderness or guarding. There is significant right-sided CVA tenderness. Musculoskeletal:         General: No deformity. Normal range of motion. Cervical back: Normal range of motion and neck supple. Skin:     General: Skin is warm. Findings: No rash. Neurological:      Mental Status: He is alert and oriented to person, place, and time. Motor: No abnormal muscle tone.       Coordination: Coordination normal.   Psychiatric:         Mood and Affect: Mood normal.         Behavior: Behavior normal.           ED Course    ED Medication Orders (From admission, onward)    Start Ordered     Status Ordering Provider    04/30/22 1000 04/29/22 1409  cefTRIAXone (ROCEPHIN) 1000 mg IVPB in 50 mL D5W minibag  EVERY 24 HOURS        Question Answer Comment   Antimicrobial Indications Urinary Tract Infection    UTI duration of therapy 5 days        Acknowledged ROLF RESENDIZ    04/30/22 0900 04/29/22 1535  NIFEdipine (PROCARDIA XL) extended release tablet 60 mg  DAILY         Acknowledged ROLF RESENDIZ    04/29/22 2100 04/29/22 1409  sodium chloride flush 0.9 % injection 5-40 mL 2 times per day         Acknowledged BUN, ROLF SALDANA    04/29/22 2100 04/29/22 1534  atorvastatin (LIPITOR) tablet 20 mg  NIGHTLY         Acknowledged BUN, ROLF S    04/29/22 1615 04/29/22 1612  labetalol (NORMODYNE;TRANDATE) injection 20 mg  EVERY 3 HOURS PRN         Acknowledged BUN, ROLF S    04/29/22 1600 04/29/22 1534  citalopram (CELEXA) tablet 10 mg  DAILY         Acknowledged BUN, ROLF S    04/29/22 1600 04/29/22 1534  allopurinol (ZYLOPRIM) tablet 100 mg  DAILY         Acknowledged BUN, ROLF S    04/29/22 1600 04/29/22 1534  pantoprazole (PROTONIX) tablet 40 mg  2 TIMES DAILY BEFORE MEALS         Acknowledged BUN, ROLF S    04/29/22 1427 04/29/22 1428  morphine (PF) injection 2 mg  EVERY 4 HOURS PRN         Acknowledged BUN, ROLF S    04/29/22 1409 04/29/22 1409  sodium chloride flush 0.9 % injection 5-40 mL  PRN         Acknowledged BUNTIGISTROLF S    04/29/22 1409 04/29/22 1409  0.9 % sodium chloride infusion  PRN         Acknowledged BUN, ROLF S    04/29/22 1409 04/29/22 1409  ondansetron (ZOFRAN-ODT) disintegrating tablet 4 mg  EVERY 8 HOURS PRN        \"Or\" Linked Group Details    Last MAR action: See Alternative - by Love Arriaga on 04/29/22 at 1548 ROLF RESENDIZ    04/29/22 1409 04/29/22 1409  ondansetron (ZOFRAN) injection 4 mg  EVERY 6 HOURS PRN        \"Or\" Linked Group Details    Last MAR action: Given - by Love Arriaga on 04/29/22 at 1548 ASTRIDROLF    04/29/22 1409 04/29/22 1409  acetaminophen (TYLENOL) tablet 650 mg  EVERY 6 HOURS PRN        \"Or\" Linked Group Details    Acknowledged ASTRIDROLF    04/29/22 1409 04/29/22 1409  acetaminophen (TYLENOL) suppository 650 mg  EVERY 6 HOURS PRN        \"Or\" Linked Group Details    Acknowledged BUNROLF    04/29/22 1409 04/29/22 1409  polyethylene glycol (GLYCOLAX) packet 17 g  DAILY PRN         Acknowledged ASTRID ROLF S    04/29/22 1145 04/29/22 1132  morphine sulfate (PF) injection 4 mg  ONCE         Last MAR action: Given - by Sekou Kennedy on 04/29/22 at 1324 LEDA HUFF    04/29/22 1100 04/29/22 1046  morphine sulfate (PF) injection 4 mg  ONCE         Last MAR action: Given - by HANS ZAPIEN on 04/29/22 at 1106 LEDA HUFF    04/29/22 0930 04/29/22 0916  cefTRIAXone (ROCEPHIN) 1000 mg IVPB in 50 mL D5W minibag  ONCE        Question:  Antimicrobial Indications  Answer:  Urinary Tract Infection    Last MAR action: Stopped - by HANS Carlton on 04/29/22 at [facility] LEDA LOPEZ    04/29/22 0745 04/29/22 0738  morphine sulfate (PF) injection 4 mg  ONCE         Last MAR action: Given - by HANS ZAPIEN on 04/29/22 at [address] [facility], LEDA LOPEZ    04/29/22 0745 04/29/22 0738  ondansetron (ZOFRAN) injection 4 mg  ONCE         Last MAR action: Given - by HANS Carlton on 04/29/22 at [address] [facility], [address] [facility] [address] LOPEZ            Radiology  CT ABDOMEN PELVIS WO CONTRAST Additional Contrast? None    Result Date: 4/29/2022  EXAMINATION: CT OF THE ABDOMEN AND PELVIS WITHOUT CONTRAST 4/29/2022 7:49 am TECHNIQUE: CT of the abdomen and pelvis was performed without the administration of intravenous contrast. Multiplanar reformatted images are provided for review. Dose modulation, iterative reconstruction, and/or weight based adjustment of the mA/kV was utilized to reduce the radiation dose to as low as reasonably achievable. COMPARISON: 02/18/2022. HISTORY: ORDERING SYSTEM PROVIDED HISTORY: flank pain, known renal mass TECHNOLOGIST PROVIDED HISTORY: Reason for exam:->flank pain, known renal mass Additional Contrast?->None Decision Support Exception - unselect if not a suspected or confirmed emergency medical condition->Emergency Medical Condition (MA) Reason for Exam: right flank pain, severe since yesterday, gross hematuria, known polycystic disease, scheduled for kidney transplant 3-4 months from now Additional signs and symptoms: prev umbilical hernia repair FINDINGS: Lower Chest: There is no consolidation or effusion. Organs: The kidneys are massively enlarged, containing innumerable small to large simple and hyperdense cysts. Solitary soft tissue mass within the anterior aspect of the upper pole left kidney is unchanged in size and appearance, again measuring 4.8 x 4.9 cm. A few benign simple hepatic cysts are again demonstrated. The remainder of the solid abdominal organs are unremarkable. GI/Bowel: There is no bowel dilatation, wall thickening or obstruction. Extensive diverticular changes are present throughout the left hemicolon. The appendix is normal. Pelvis: The bladder and prostate are unremarkable. Peritoneum/Retroperitoneum: There is no free air, free fluid or intraperitoneal inflammatory change. There is no adenopathy. Bones/Soft Tissues: There is no acute fracture or aggressive osseous lesion. 1. Unchanged polycystic kidney disease with a stable 4.9 cm left renal mass, again concerning for renal cell carcinoma. 2. Diverticulosis.          Labs  Results for orders placed or performed during the hospital encounter of 04/29/22   CBC with Auto Differential   Result Value Ref Range    WBC 11.5 (H) 4.0 - 11.0 K/uL    RBC 4.08 (L) 4.20 - 5.90 M/uL    Hemoglobin 11.7 (L) 13.5 - 17.5 g/dL    Hematocrit 35.0 (L) 40.5 - 52.5 %    MCV 85.8 80.0 - 100.0 fL    MCH 28.6 26.0 - 34.0 pg    MCHC 33.4 31.0 - 36.0 g/dL    RDW 13.9 12.4 - 15.4 %    Platelets 249 338 - 385 K/uL    MPV 6.9 5.0 - 10.5 fL    Neutrophils % 79.5 %    Lymphocytes % 11.6 %    Monocytes % 8.0 %    Eosinophils % 0.4 %    Basophils % 0.5 %    Neutrophils Absolute 9.2 (H) 1.7 - 7.7 K/uL    Lymphocytes Absolute 1.3 1.0 - 5.1 K/uL    Monocytes Absolute 0.9 0.0 - 1.3 K/uL    Eosinophils Absolute 0.0 0.0 - 0.6 K/uL    Basophils Absolute 0.1 0.0 - 0.2 K/uL   Comprehensive Metabolic Panel   Result Value Ref Range    Sodium 133 (L) 136 - 145 mmol/L    Potassium 3.5 3.5 - 5.1 mmol/L    Chloride 95 (L) 99 - 110 mmol/L    CO2 20 (L) 21 - 32 mmol/L    Anion Gap 18 (H) 3 - 16    Glucose 124 (H) 70 - 99 mg/dL    BUN 50 (H) 7 - 20 mg/dL    CREATININE 4.8 (H) 0.9 - 1.3 mg/dL    GFR Non-African American 13 (A) >60    GFR  16 (A) >60    Calcium 10.2 8.3 - 10.6 mg/dL    Total Protein 8.5 (H) 6.4 - 8.2 g/dL    Albumin 4.6 3.4 - 5.0 g/dL    Albumin/Globulin Ratio 1.2 1.1 - 2.2    Total Bilirubin <0.2 0.0 - 1.0 mg/dL    Alkaline Phosphatase 84 40 - 129 U/L    ALT 11 10 - 40 U/L    AST 12 (L) 15 - 37 U/L   Urinalysis with Reflex to Culture    Specimen: Urine, clean catch   Result Value Ref Range    Color, UA RED (A) Straw/Yellow    Clarity, UA Clear Clear    Glucose, Ur 100 (A) Negative mg/dL    Bilirubin Urine Negative Negative    Ketones, Urine Negative Negative mg/dL    Specific Gravity, UA 1.015 1.005 - 1.030    Blood, Urine LARGE (A) Negative    pH, UA 7.0 5.0 - 8.0    Protein, UA >=300 (A) Negative mg/dL    Urobilinogen, Urine 0.2 <2.0 E.U./dL    Nitrite, Urine Negative Negative    Leukocyte Esterase, Urine MODERATE (A) Negative    Microscopic Examination YES     Urine Type NotGiven     Urine Reflex to Culture Not Indicated    Microscopic Urinalysis   Result Value Ref Range    WBC, UA see below (A) 0 - 5 /HPF    RBC, UA see below (A) 0 - 4 /HPF    Urinalysis Comments see below    Lactate, Sepsis   Result Value Ref Range    Lactic Acid, Sepsis 1.0 0.4 - 1.9 mmol/L   Procalcitonin   Result Value Ref Range    Procalcitonin 0.32 (H) 0.00 - 0.15 ng/mL         MDM  Patient seen and evaluated. Relevant records reviewed. 43-year-old male with polycystic kidney disease who presents with flank pain and gross hematuria. Patient with significant pain on arrival requiring multiple rounds of IV narcotic pain medication. Has evidence of pyuria nitrate negative however is hematuria obscures any microanalysis. CT Noncon does not show any evidence of obstructive process.   I am concerned for pyelonephritis given his significant tenderness but may also be secondary to his abnormal anatomy as his kidneys are impressively large. I discussed the case initially with our urology service who recommended consideration for transfer to [facility] where there is transplant available. I discussed the case with hospitalist service at [facility] who declined after consulting transplant as they stated that there is nothing to do from their service as long as the patient is not hemodynamically unstable. From a pain control standpoint alone patient will require admission. We will treat with broad-spectrum antibiotics for pyelonephritis. Will admit for further management. Clinical Impression:  1. Pyelonephritis    2. Acute right flank pain    3. Polycystic kidney disease    4. Chronic kidney disease, unspecified CKD stage    5. Anemia, unspecified type          Disposition:  Admit to med/surg floor in stable condition. Blood pressure (!) 180/140, pulse 112, temperature 98.8 °F (37.1 °C), temperature source Oral, resp. rate 18, height 6' (1.829 m), weight 220 lb (99.8 kg), SpO2 95 %. Patient was given scripts for the following medications. I counseled patient how to take these medications. Current Discharge Medication List          Disposition referral (if applicable):  No follow-up provider specified. Total critical care time is 35 minutes, which excludes separately billable procedures and updating family. Time spent is specifically for management of the presenting complaint and symptoms initially, direct bedside care, reevaluation, review of records, and consultation. There was a high probability of clinically significant life-threatening deterioration in the patient's condition, which required my urgent intervention. This chart was generated in part by using Dragon Dictation system and may contain errors related to that system including errors in grammar, punctuation, and spelling, as well as words and phrases that may be inappropriate.  If there are any questions or concerns please feel free to contact the dictating provider for clarification.      Ann Hurtado MD  [address] MD  04/29/22 7226

## 2022-04-29 NOTE — PLAN OF CARE
Problem: Pain  Goal: Verbalizes/displays adequate comfort level or baseline comfort level  Outcome: Not Progressing

## 2022-04-29 NOTE — PROGRESS NOTES
Pt to 543 at this time from ED via stretcher, family at bedside. Pt oriented to room as well as call system, pt provided menu and instructions on how to order. Pt ambulatory with steady gait, shoes and shirt place in room closet.

## 2022-04-29 NOTE — CONSULTS
Urology Attending Consult Note      Reason for Consultation: flank pain, polycystic kidneys    History: 36 y/o male with polycystic kidneys and CKD has right flank pain. CT shows large polycystic kidneys, no stones. He has a stable 5cm left renal mass suspicious for RCC. He is on the transplant list at [facility].  His Urologist is Dr Whitley Baptiste at [facility].  The patient states the doctors at [facility] do not think he has renal tumors. No stones or obstruction seen on CT. Family History, Social History, Review of Systems:  Reviewed and agreed to as per chart    Vitals:  BP (!) 180/116   Pulse 102   Temp 99.6 °F (37.6 °C) (Oral)   Resp 18   Ht 6' (1.829 m)   Wt 220 lb (99.8 kg)   SpO2 96%   BMI 29.84 kg/m²   Temp  Av.6 °F (37.6 °C)  Min: 99.6 °F (37.6 °C)  Max: 99.6 °F (37.6 °C)    Intake/Output Summary (Last 24 hours) at 2022 1230  Last data filed at 2022 1049  Gross per 24 hour   Intake 50 ml   Output --   Net 50 ml         Physical:   Well developed, well nourished in no acute distress   Mood indicates no abnormalities. Pt doesnt appear depressed   Orientated to time and place   Neck is supple, trachea is midline   Respiratory effort is normal   Cardiovascular show no extremity swelling   Abdomen shows abdominal masses consistent with polycystic kidneys, no hernias are palpated, there is tenderness. Liver and Spleen appear normal.   Skin show no abnormal lesions   Lymph nodes are not palpated in the inguinal, neck, or axillary area.      Labs:  WBC:    Lab Results   Component Value Date    WBC 11.5 2022     Hemoglobin/Hematocrit:    Lab Results   Component Value Date    HGB 11.7 2022    HCT 35.0 2022     BMP:    Lab Results   Component Value Date     2022    K 3.5 2022    K 4.1 2022    CL 95 2022    CO2 20 2022    BUN 50 2022    LABALBU 4.6 2022    CREATININE 4.8 2022    CALCIUM 10.2 2022    GFRAA 16 04/29/2022    GFRAA >60 05/02/2013    LABGLOM 13 04/29/2022     PT/INR:    Lab Results   Component Value Date    PROTIME 10.6 02/18/2022    INR 0.94 02/18/2022     PTT:  No results found for: APTT[APTT    Urinalysis: large blood, moderate LE    Urine Culture: pending    Blood Culture: pending    Antibiotic Therapy: Rocephin    Imaging: CT-   Unchanged polycystic kidney disease with a stable 4.9 cm left renal mass,   again concerning for renal cell carcinoma. Impression/Plan: flank pain, possible UTI  1. Cultures pending, on Rocephin  2. No stents or procedures needed at this time  3. Pain control and await cultures, discharge as soon as clinically improved. 4. Pt will need outpatient f/u with his [address] [place] [address] Urologist and transplant doctors regarding his suspicious renal mass.     Maxi Fernández MD

## 2022-04-29 NOTE — CONSULTS
[facility] Consult Note           Reason for Consult:  CKD stage 5, Uncontrolled Hypertension  Requesting Physician:  Dr. Ivan Tucker    Chief Complaint:  Flank pain  History Obtained From:  patient, electronic medical record    History of Present Ilness:    37year old male with advanced 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 admitted with flank pain. We have been asked to assist in further mgmt of his CKD stage 5, uncontrolled Hypertension. Had recent coiling of an intracranial aneurysm on 4/15/22. States since then his BP's have been running much higher. Has had left-sided neck pain for which he took tylenol, then ibuprofen for relief. Started having right sided flank pain, gross hematuria over past day. Intake adequate, no shortness of breath. Past Medical History:        Diagnosis Date    ADD (attention deficit disorder with hyperactivity)     CKD (chronic kidney disease) stage 5, GFR less than 15 ml/min ([facility])     ADPCKD    Hyperlipidemia     Hypertension     PKD (polycystic kidney disease)        Past Surgical History:        Procedure Laterality Date    HERNIA REPAIR      OTHER SURGICAL HISTORY Bilateral 5/15/13    BILATERAL LAPAROSCOPIC PREPERITONEAL INGUINAL HERNIA REPAIR    UMBILICAL HERNIA REPAIR N/A 1/75/0301    OPEN UMBILICAL HERNIA REPAIR performed by Mark Read MD at [facility]  3/15/2011    VASECTOMY      WISDOM TOOTH EXTRACTION         Home Medications:    No current facility-administered medications on file prior to encounter.      Current Outpatient Medications on File Prior to Encounter   Medication Sig Dispense Refill    NIFEdipine (PROCARDIA XL) 30 MG extended release tablet Take 2 tablets by mouth daily 60 tablet 0    omeprazole (PRILOSEC) 40 MG delayed release capsule TAKE 1 CAPSULE BY MOUTH EVERY DAY IN THE MORNING BEFORE BREAKFAST 90 capsule 1    citalopram (CELEXA) 10 MG tablet TAKE 1 TABLET BY MOUTH EVERY DAY 90 tablet 1    simvastatin (ZOCOR) 40 MG tablet TAKE 1 TABLET BY MOUTH EVERY DAY AT NIGHT 90 tablet 1    cloNIDine (CATAPRES) 0.3 MG/24HR PTWK PLACE 1 PATCH ONTO THE SKIN EVERY 7 DAYS.  allopurinol (ZYLOPRIM) 100 MG tablet TAKE 1 TABLET BY MOUTH EVERY DAY 90 tablet 1       Allergies:  Bee pollen, Hydrocodone, Iodine, Nsaids, Peanut-containing drug products, and Shellfish-derived products    Social History:    Social History     Socioeconomic History    Marital status:      Spouse name: Not on file    Number of children: Not on file    Years of education: Not on file    Highest education level: Not on file   Occupational History    Not on file   Tobacco Use    Smoking status: Former Smoker     Packs/day: 0.25     Years: 26.00     Pack years: 6.50     Types: Cigarettes    Smokeless tobacco: Never Used    Tobacco comment: 1 pack per month   Vaping Use    Vaping Use: Never used   Substance and Sexual Activity    Alcohol use: Yes     Comment: rarely    Drug use: Not Currently    Sexual activity: Yes     Partners: Female   Other Topics Concern    Not on file   Social History Narrative    Not on file     Social Determinants of Health     Financial Resource Strain: Low Risk     Difficulty of Paying Living Expenses: Not hard at all   Food Insecurity: No Food Insecurity    Worried About Running Out of Food in the Last Year: Never true    Naomi of Food in the Last Year: Never true   Transportation Needs: No Transportation Needs    Lack of Transportation (Medical): No    Lack of Transportation (Non-Medical):  No   Physical Activity:     Days of Exercise per Week: Not on file    Minutes of Exercise per Session: Not on file   Stress:     Feeling of Stress : Not on file   Social Connections:     Frequency of Communication with Friends and Family: Not on file    Frequency of Social Gatherings with Friends and Family: Not on file    Attends Caodaism Services: Not on file  Active Member of Clubs or Organizations: Not on file    Attends Club or Organization Meetings: Not on file    Marital Status: Not on file   Intimate Partner Violence:     Fear of Current or Ex-Partner: Not on file    Emotionally Abused: Not on file    Physically Abused: Not on file    Sexually Abused: Not on file   Housing Stability: Unknown    Unable to Pay for Housing in the Last Year: No    Number of [place] in the Last Year: Not on file    Unstable Housing in the Last Year: No       Family History:   Family History   Problem Relation Age of Onset    Kidney Disease Mother     Heart Disease Mother         CHF    High Blood Pressure Sister     High Cholesterol Sister     Other Sister         PKD    High Blood Pressure Brother     High Cholesterol Brother     Other Brother         PKD       Review of Systems:   Pertinent positives stated above in HPI. Remainder of 10 point review of systems were reviewed and were negative.     Physical exam:   Constitutional:  VITALS:  BP (!) 180/140   Pulse 112   Temp 98.8 °F (37.1 °C) (Oral)   Resp 18   Ht 6' (1.829 m)   Wt 220 lb (99.8 kg)   SpO2 95%   BMI 29.84 kg/m²   Gen: alert, awake, ill-appearing  Skin: no rash, turgor wnl  Heent:  eomi, mmm  Neck: no bruits or jvd noted, thyroid normal  Cardiovascular:  S1, S2 without m/r/g  Respiratory: CTA B without w/r/r; respiratory effort normal  Abdomen:  +bs, soft, nt, distended, tender, no hepatosplenomegaly  Ext: no lower extremity edema  Psychiatric: mood and affect appropriate; judgement and insight intact  Musculoskeletal:  Rom, muscular strength limited; digits, nails normal    Data/  CBC:   Lab Results   Component Value Date    WBC 11.5 04/29/2022    RBC 4.08 04/29/2022    HGB 11.7 04/29/2022    HCT 35.0 04/29/2022    MCV 85.8 04/29/2022    MCH 28.6 04/29/2022    MCHC 33.4 04/29/2022    RDW 13.9 04/29/2022     04/29/2022    MPV 6.9 04/29/2022     BMP:    Lab Results   Component Value Date     04/29/2022    K 3.5 04/29/2022    K 4.1 02/19/2022    CL 95 04/29/2022    CO2 20 04/29/2022    BUN 50 04/29/2022    LABALBU 4.6 04/29/2022    CREATININE 4.8 04/29/2022    CALCIUM 10.2 04/29/2022    GFRAA 16 04/29/2022    GFRAA >60 05/02/2013    LABGLOM 13 04/29/2022    GLUCOSE 124 04/29/2022     UA large blood, >300 protein    Assessment/    - 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 - follows with me in office, slowly progressive, SCr ~5 range    - Acute flank pain - Differential diagnosis of acute pyelonephritis, UTI, versus cyst rupture - on ceftriaxone    - Hypertension - uncontrolled    - Renal mass - concern for malignancy, MRI from March 2022 revealing bilateral renal cysts with no evidence of mass, [facility] with [facility]    - Gout      Plan/    - Labetalol IV prn for SBP above 170  - Continue nifedipine, switch back from clonidine patch to pills three times a day  - Trend labs, bp's, cultures, & urine output      Thank you for the consultation. Please do not hesitate to call with questions. ____________________________________  Román Deluna MD  [facility]  [email or web address]  Office: [phone]

## 2022-04-30 LAB
ANION GAP SERPL CALCULATED.3IONS-SCNC: 17 MMOL/L (ref 3–16)
BASOPHILS ABSOLUTE: 0 K/UL (ref 0–0.2)
BASOPHILS RELATIVE PERCENT: 0.4 %
BUN BLDV-MCNC: 54 MG/DL (ref 7–20)
CALCIUM SERPL-MCNC: 10.1 MG/DL (ref 8.3–10.6)
CHLORIDE BLD-SCNC: 99 MMOL/L (ref 99–110)
CO2: 20 MMOL/L (ref 21–32)
CREAT SERPL-MCNC: 5.4 MG/DL (ref 0.9–1.3)
EOSINOPHILS ABSOLUTE: 0 K/UL (ref 0–0.6)
EOSINOPHILS RELATIVE PERCENT: 0.1 %
GFR AFRICAN AMERICAN: 14
GFR NON-AFRICAN AMERICAN: 12
GLUCOSE BLD-MCNC: 130 MG/DL (ref 70–99)
HCT VFR BLD CALC: 28.1 % (ref 40.5–52.5)
HEMOGLOBIN: 9.3 G/DL (ref 13.5–17.5)
LYMPHOCYTES ABSOLUTE: 1.4 K/UL (ref 1–5.1)
LYMPHOCYTES RELATIVE PERCENT: 12.7 %
MCH RBC QN AUTO: 28.9 PG (ref 26–34)
MCHC RBC AUTO-ENTMCNC: 33.3 G/DL (ref 31–36)
MCV RBC AUTO: 86.8 FL (ref 80–100)
MONOCYTES ABSOLUTE: 1.3 K/UL (ref 0–1.3)
MONOCYTES RELATIVE PERCENT: 11.5 %
NEUTROPHILS ABSOLUTE: 8.4 K/UL (ref 1.7–7.7)
NEUTROPHILS RELATIVE PERCENT: 75.3 %
PDW BLD-RTO: 14 % (ref 12.4–15.4)
PLATELET # BLD: 276 K/UL (ref 135–450)
PMV BLD AUTO: 7.5 FL (ref 5–10.5)
POTASSIUM REFLEX MAGNESIUM: 3.8 MMOL/L (ref 3.5–5.1)
RBC # BLD: 3.23 M/UL (ref 4.2–5.9)
SODIUM BLD-SCNC: 136 MMOL/L (ref 136–145)
URINE CULTURE, ROUTINE: NORMAL
WBC # BLD: 11.2 K/UL (ref 4–11)

## 2022-04-30 PROCEDURE — 94761 N-INVAS EAR/PLS OXIMETRY MLT: CPT

## 2022-04-30 PROCEDURE — 2580000003 HC RX 258

## 2022-04-30 PROCEDURE — 6370000000 HC RX 637 (ALT 250 FOR IP): Performed by: UROLOGY

## 2022-04-30 PROCEDURE — 1200000000 HC SEMI PRIVATE

## 2022-04-30 PROCEDURE — 6370000000 HC RX 637 (ALT 250 FOR IP): Performed by: INTERNAL MEDICINE

## 2022-04-30 PROCEDURE — 36415 COLL VENOUS BLD VENIPUNCTURE: CPT

## 2022-04-30 PROCEDURE — 6360000002 HC RX W HCPCS

## 2022-04-30 PROCEDURE — 6370000000 HC RX 637 (ALT 250 FOR IP)

## 2022-04-30 PROCEDURE — 85025 COMPLETE CBC W/AUTO DIFF WBC: CPT

## 2022-04-30 PROCEDURE — 2700000000 HC OXYGEN THERAPY PER DAY

## 2022-04-30 PROCEDURE — 80048 BASIC METABOLIC PNL TOTAL CA: CPT

## 2022-04-30 RX ORDER — OXYCODONE HYDROCHLORIDE 5 MG/1
5 TABLET ORAL EVERY 4 HOURS PRN
Status: DISCONTINUED | OUTPATIENT
Start: 2022-04-30 | End: 2022-05-04

## 2022-04-30 RX ADMIN — SODIUM CHLORIDE, PRESERVATIVE FREE 10 ML: 5 INJECTION INTRAVENOUS at 09:13

## 2022-04-30 RX ADMIN — ACETAMINOPHEN 650 MG: 325 TABLET ORAL at 02:15

## 2022-04-30 RX ADMIN — CEFTRIAXONE SODIUM 1000 MG: 1 INJECTION, POWDER, FOR SOLUTION INTRAMUSCULAR; INTRAVENOUS at 09:23

## 2022-04-30 RX ADMIN — SODIUM CHLORIDE, PRESERVATIVE FREE 10 ML: 5 INJECTION INTRAVENOUS at 21:00

## 2022-04-30 RX ADMIN — OXYCODONE 5 MG: 5 TABLET ORAL at 21:08

## 2022-04-30 RX ADMIN — ACETAMINOPHEN 650 MG: 325 TABLET ORAL at 09:12

## 2022-04-30 RX ADMIN — PANTOPRAZOLE SODIUM 40 MG: 40 TABLET, DELAYED RELEASE ORAL at 05:21

## 2022-04-30 RX ADMIN — MORPHINE SULFATE 2 MG: 2 INJECTION, SOLUTION INTRAMUSCULAR; INTRAVENOUS at 09:20

## 2022-04-30 RX ADMIN — CLONIDINE HYDROCHLORIDE 0.3 MG: 0.1 TABLET ORAL at 13:33

## 2022-04-30 RX ADMIN — OXYCODONE 5 MG: 5 TABLET ORAL at 11:33

## 2022-04-30 RX ADMIN — MORPHINE SULFATE 2 MG: 2 INJECTION, SOLUTION INTRAMUSCULAR; INTRAVENOUS at 18:32

## 2022-04-30 RX ADMIN — MORPHINE SULFATE 2 MG: 2 INJECTION, SOLUTION INTRAMUSCULAR; INTRAVENOUS at 13:33

## 2022-04-30 RX ADMIN — NIFEDIPINE 60 MG: 30 TABLET, FILM COATED, EXTENDED RELEASE ORAL at 09:12

## 2022-04-30 RX ADMIN — CLONIDINE HYDROCHLORIDE 0.3 MG: 0.1 TABLET ORAL at 09:12

## 2022-04-30 RX ADMIN — ATORVASTATIN CALCIUM 20 MG: 10 TABLET, FILM COATED ORAL at 21:07

## 2022-04-30 RX ADMIN — MORPHINE SULFATE 2 MG: 2 INJECTION, SOLUTION INTRAMUSCULAR; INTRAVENOUS at 05:21

## 2022-04-30 RX ADMIN — ACETAMINOPHEN 650 MG: 325 TABLET ORAL at 21:07

## 2022-04-30 RX ADMIN — ALLOPURINOL 100 MG: 100 TABLET ORAL at 09:12

## 2022-04-30 RX ADMIN — CLONIDINE HYDROCHLORIDE 0.3 MG: 0.1 TABLET ORAL at 21:07

## 2022-04-30 RX ADMIN — CITALOPRAM HYDROBROMIDE 10 MG: 20 TABLET ORAL at 09:12

## 2022-04-30 ASSESSMENT — PAIN SCALES - GENERAL
PAINLEVEL_OUTOF10: 7
PAINLEVEL_OUTOF10: 9
PAINLEVEL_OUTOF10: 6
PAINLEVEL_OUTOF10: 8

## 2022-04-30 ASSESSMENT — PAIN DESCRIPTION - LOCATION
LOCATION: FLANK
LOCATION: ABDOMEN
LOCATION: FLANK
LOCATION: FLANK

## 2022-04-30 ASSESSMENT — PAIN DESCRIPTION - DESCRIPTORS
DESCRIPTORS: SHARP
DESCRIPTORS: ACHING

## 2022-04-30 ASSESSMENT — PAIN DESCRIPTION - ORIENTATION
ORIENTATION: RIGHT

## 2022-04-30 NOTE — PROGRESS NOTES
Urology Attending Progress Note      Subjective: pain slightly improved and localized to right kidney    Vitals:  BP (!) 145/99   Pulse 90   Temp 98.9 °F (37.2 °C) (Oral)   Resp 16   Ht 6' (1.829 m)   Wt 220 lb (99.8 kg)   SpO2 93%   BMI 29.84 kg/m²   Temp  Av.6 °F (37.6 °C)  Min: 98.8 °F (37.1 °C)  Max: 100.9 °F (38.3 °C)    Intake/Output Summary (Last 24 hours) at 2022 0955  Last data filed at 2022 1049  Gross per 24 hour   Intake 50 ml   Output --   Net 50 ml       Exam: abd soft    Labs:  WBC:    Lab Results   Component Value Date    WBC 11.2 2022     Hemoglobin/Hematocrit:    Lab Results   Component Value Date    HGB 9.3 2022    HCT 28.1 2022     BMP:    Lab Results   Component Value Date     2022    K 3.8 2022    CL 99 2022    CO2 20 2022    BUN 54 2022    LABALBU 4.6 2022    CREATININE 5.4 2022    CALCIUM 10.1 2022    GFRAA 14 2022    GFRAA >60 2013    LABGLOM 12 2022     PT/INR:    Lab Results   Component Value Date    PROTIME 10.6 2022    INR 0.94 2022     PTT:  No results found for: APTT[APTT    Urinalysis: large blood, moderate LE    Urine Culture:  pending    Blood Culture:  pending    Antibiotic Therapy:  Rocephin    Impression/Plan: pyelonephritis  1. Pain control  2. On Rocephin, f/u cultures  3. D/c when pain controlled and cultures final  4.  Added oxycodone for pain    Chelly Shi MD

## 2022-04-30 NOTE — PROGRESS NOTES
[facility] Progress Note           Subjective/   [address] old male who we are seeing in consultation for CKD-5, HTN.     HPI:  Renal function within range. Intake adequate. ROS:  BP's better, +flank pain, hematuria clearing, +fevers. Objective/   GEN:  Chronically ill, BP (!) 165/100   Pulse 84   Temp 98.6 °F (37 °C) (Oral)   Resp 18   Ht 6' (1.829 m)   Wt 220 lb (99.8 kg)   SpO2 92%   BMI 29.84 kg/m²   HEENT: non-icteric, no JVD  CV: S1, S2 without m/r/g; no LE edema  RESP: CTA B without w/r/r; breathing wnl  ABD: +bs, soft, tender no right, distended, no hsm  SKIN: warm, no rashes    Data/  Recent Labs     04/29/22  0649 04/30/22  0601   WBC 11.5* 11.2*   HGB 11.7* 9.3*   HCT 35.0* 28.1*   MCV 85.8 86.8    276     Recent Labs     04/29/22  0649 04/30/22  0601   * 136   K 3.5 3.8   CL 95* 99   CO2 20* 20*   GLUCOSE 124* 130*   BUN 50* 54*   CREATININE 4.8* 5.4*   LABGLOM 13* 12*   GFRAA 16* 14*       Assessment/     - 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 - follows with me in office, slowly progressive, SCr ~5 range     - Acute right flank pain - Differential diagnosis of acute pyelonephritis, UTI, versus cyst rupture - on ceftriaxone     - Hypertension - uncontrolled     - Renal mass - concern for malignancy, MRI from March 2022 revealing bilateral renal cysts with no evidence of mass, follows with Urology at [address]     - Gout    Plan/     - Continue nifedipine - titrate dose  - Switch back from clonidine patch to pills three times a day  - Trend labs, bp's, cultures, & urine output        ____________________________________  Colin Todd MD  [facility]  [email or web address]  Office: [phone]

## 2022-04-30 NOTE — PLAN OF CARE
Problem: Discharge Planning  Goal: Discharge to home or other facility with appropriate resources  Outcome: Progressing     Problem: Pain  Goal: Verbalizes/displays adequate comfort level or baseline comfort level  4/29/2022 2300 by Miguel Angel Aquino RN  Outcome: Progressing  4/29/2022 1429 by Cheyenne Flores RN  Outcome: Not Progressing     Problem: Safety - Adult  Goal: Free from fall injury  Outcome: Progressing

## 2022-04-30 NOTE — PROGRESS NOTES
Progress Note      PCP: ANA Arias - CNP    Date of Admission: 4/29/2022    Chief Complaint:   Patient presents with    Flank Pain       pt reports he has chronic kidney disease and has been taking ibuprofen lately. states he has right flank pain and is now urinating blood.         Hospital Course:   Jone Cheatham is a 37 y.o. male who presented to [facility] with right-sided flank pain and gross hematuria. Patient has medical history significant for CKD Stage V secondary to polycystic kidney disease awaiting transplant with [facility], HTN, HLD, GERD and s/p coiling of intracranial aneurysm on 4/15/2022.     Since the procedure, patient has had severe left-sided neck pain which he has attributed to lying on that side for too long. He was recently in the ED at [facility] concerning this pain. Patient has tried pain control with acetaminophen, but has been without relief. He reports over the last week he has been taking ibuprofen, about 2-4 200-mg tablets every 4 hours for the last week for pain relief. He admits that he knows he should not take it with his CKD, but it was the only medication that would give him pain relief.     Right-sided flank pain started last night, characterizes as a sharp, stabbing pain. Pain radiates over the front of his abdomen. Pt states that his abdomen typically feels full due to his PCKD, but has had more pain. Pt states that he has had associated gross hematuria over the past day. Pt denies and dysuria or frequency. Has had some nausea he relates to pain, but no vomiting. Denies fever, chills, or changes with BMs.     ED Workup: Pt found to be very hypertensive, he reports his HTN was previously well controlled on his regimen, but since the surgery and the pain he has had, it has been uncontrolled in the 180s/120s. . Reports highest was noted yesterday at 201/140. Cr 4.8, but unsure of baseline with his disease. Last measured 2/19: 4.5.  Insignificant leukocytosis at 11.5. UA with gross hematuria, proteinuria, and leukocyte esterase, but nitrite negative. Reflex to culture was not indicated, but this has been ordered due to patient's right flank pain. He received empiric Abx, ceftriaxone 1x. Subjective:   Patient with elevated BP overnight, requiring labetalol 1x for /124. No other acute events overnight. Nephrology consulted, recommend switching back to clonidine tablets 0.3 mg TID for BP management. Pain management good with morphine, has improved over his abdomen. Pt with nausea, but no vomiting. Pt denies having much of an appetite, but is drinking some. Denies headache, dizziness, cp, sob, changes in bowels or bladder. Medications:  Reviewed    Infusion Medications    sodium chloride       Scheduled Medications    sodium chloride flush  5-40 mL IntraVENous 2 times per day    cefTRIAXone (ROCEPHIN) IV  1,000 mg IntraVENous Q24H    citalopram  10 mg Oral Daily    allopurinol  100 mg Oral Daily    pantoprazole  40 mg Oral BID AC    atorvastatin  20 mg Oral Nightly    NIFEdipine  60 mg Oral Daily    cloNIDine  0.3 mg Oral TID     PRN Meds: sodium chloride flush, sodium chloride, ondansetron **OR** ondansetron, acetaminophen **OR** acetaminophen, polyethylene glycol, morphine, labetalol, calcium carbonate      Intake/Output Summary (Last 24 hours) at 4/30/2022 0910  Last data filed at 4/29/2022 1049  Gross per 24 hour   Intake 50 ml   Output --   Net 50 ml       Physical Exam Performed:    BP (!) 145/99   Pulse 90   Temp 98.9 °F (37.2 °C) (Oral)   Resp 16   Ht 6' (1.829 m)   Wt 220 lb (99.8 kg)   SpO2 93%   BMI 29.84 kg/m²     General appearance:  No apparent distress, appears stated age and cooperative. HEENT:  Normal cephalic, atraumatic without obvious deformity. Pupils equal, round, and reactive to light. Extra ocular muscles intact. Conjunctivae/corneas clear. Neck: Supple, with full range of motion.  No jugular venous distention. Trachea midline. Respiratory:  Normal respiratory effort. Clear to auscultation, bilaterally without Rales/Wheezes/Rhonchi. Cardiovascular:  Regular rate and rhythm with normal S1/S2 without murmurs, rubs or gallops. Abdomen: Soft, tender. Masses noted: kidneys are palpable anteriorly. Significant right-sided CVA tenderness. Musculoskeletal:  No clubbing, cyanosis or edema bilaterally. Full range of motion without deformity. Skin: Skin color, texture, turgor normal.  No rashes or lesions. Neurologic:  Neurovascularly intact without any focal sensory/motor deficits. Cranial nerves: II-XII intact, grossly non-focal.  Psychiatric:  Alert and oriented, thought content appropriate, normal insight  Capillary Refill: Brisk,3 seconds, normal  Peripheral Pulses: +2 palpable, equal bilaterally       Labs:   Recent Labs     04/29/22  0649 04/30/22  0601   WBC 11.5* 11.2*   HGB 11.7* 9.3*   HCT 35.0* 28.1*    276     Recent Labs     04/29/22  0649 04/30/22  0601   * 136   K 3.5 3.8   CL 95* 99   CO2 20* 20*   BUN 50* 54*   CREATININE 4.8* 5.4*   CALCIUM 10.2 10.1     Recent Labs     04/29/22  0649   AST 12*   ALT 11   BILITOT <0.2   ALKPHOS 84     No results for input(s): INR in the last 72 hours. No results for input(s): Les [place] in the last 72 hours. Urinalysis:      Lab Results   Component Value Date    NITRU Negative 04/29/2022    WBCUA see below 04/29/2022    BACTERIA Rare 02/18/2022    RBCUA see below 04/29/2022    BLOODU LARGE 04/29/2022    SPECGRAV 1.015 04/29/2022    GLUCOSEU 100 04/29/2022    GLUCOSEU NEGATIVE 09/21/2011       Radiology:  CT ABDOMEN PELVIS WO CONTRAST Additional Contrast? None   Final Result   1. Unchanged polycystic kidney disease with a stable 4.9 cm left renal mass,   again concerning for renal cell carcinoma. 2. Diverticulosis.                  Assessment/Plan:    Active Hospital Problems    Diagnosis     Pyelonephritis [N12]      Priority: Medium  Polycystic kidney disease [Q61.3]      Priority: Medium    Acute kidney injury superimposed on CKD ([facility] [address] [N17.9, N18.9]      Priority: Medium    Uncontrolled hypertension [I10]     Anemia due to stage 5 chronic kidney disease, not on chronic dialysis (HCC) [N18.5, D63.1]      Pyelonephritis,   DAMASO on CKD Stage V with PCKD  Anemia due to CKD  - Though no severe leukocytosis, physical exam concerning for pyelonephritis. - Urology consulted, Pt started empiric abx, cetriaxone q 24 hrs  - Blood and urine cultures pending. Lactic acid 1.0  - Pain management with morphine 2 mg q 2 hrs prn  - Management limited due to CKD and ineffectiveness of acetaminophen  - DVT prophylaxis with SCDs  - ctm CBC for anemia and platelets     Uncontrolled HTN  - Likely related to pain post-operatively  - Continued home nifedipine 60 mg daily and clonidine patch weekly  - Clonidine patch last changed Sunday  - Prn labetalol 20 mg IV available q 3 hrs for for SBP > 170 and DBP > 100  - Nephrology consulted, recommend the above labetalol and nifedipine.  - Discontinue clonidine patch for clonidine tablet, 0.3 mg TID     HLD  - Continue statin, supplemented atorvastatin in place of simvastatin     GERD  - Continue PPI, supplemented pantoprazole in place of omeprazole     Gout  - Continue allopurinol    DVT Prophylaxis: SCDs  Diet: ADULT DIET;  Regular; Low Phosphorus (Less than 1000 mg)  Code Status: Full Code    PT/OT Eval Status: Not ordered    Dispo - 1-2 days    Paulo Franklin MD  PGY-1

## 2022-05-01 LAB
ANION GAP SERPL CALCULATED.3IONS-SCNC: 19 MMOL/L (ref 3–16)
BUN BLDV-MCNC: 69 MG/DL (ref 7–20)
CALCIUM SERPL-MCNC: 10.1 MG/DL (ref 8.3–10.6)
CHLORIDE BLD-SCNC: 97 MMOL/L (ref 99–110)
CO2: 18 MMOL/L (ref 21–32)
CREAT SERPL-MCNC: 7.3 MG/DL (ref 0.9–1.3)
GFR AFRICAN AMERICAN: 10
GFR NON-AFRICAN AMERICAN: 8
GLUCOSE BLD-MCNC: 99 MG/DL (ref 70–99)
HCT VFR BLD CALC: 25.5 % (ref 40.5–52.5)
HEMOGLOBIN: 8.8 G/DL (ref 13.5–17.5)
MCH RBC QN AUTO: 29.2 PG (ref 26–34)
MCHC RBC AUTO-ENTMCNC: 34.5 G/DL (ref 31–36)
MCV RBC AUTO: 84.7 FL (ref 80–100)
PDW BLD-RTO: 13.7 % (ref 12.4–15.4)
PLATELET # BLD: 292 K/UL (ref 135–450)
PMV BLD AUTO: 7.5 FL (ref 5–10.5)
POTASSIUM REFLEX MAGNESIUM: 3.7 MMOL/L (ref 3.5–5.1)
RBC # BLD: 3.02 M/UL (ref 4.2–5.9)
SODIUM BLD-SCNC: 134 MMOL/L (ref 136–145)
WBC # BLD: 10 K/UL (ref 4–11)

## 2022-05-01 PROCEDURE — 6360000002 HC RX W HCPCS

## 2022-05-01 PROCEDURE — 6370000000 HC RX 637 (ALT 250 FOR IP): Performed by: UROLOGY

## 2022-05-01 PROCEDURE — 6370000000 HC RX 637 (ALT 250 FOR IP): Performed by: INTERNAL MEDICINE

## 2022-05-01 PROCEDURE — 6370000000 HC RX 637 (ALT 250 FOR IP): Performed by: NURSE PRACTITIONER

## 2022-05-01 PROCEDURE — 1200000000 HC SEMI PRIVATE

## 2022-05-01 PROCEDURE — 2580000003 HC RX 258

## 2022-05-01 PROCEDURE — 85027 COMPLETE CBC AUTOMATED: CPT

## 2022-05-01 PROCEDURE — 36415 COLL VENOUS BLD VENIPUNCTURE: CPT

## 2022-05-01 PROCEDURE — 6370000000 HC RX 637 (ALT 250 FOR IP)

## 2022-05-01 PROCEDURE — 80048 BASIC METABOLIC PNL TOTAL CA: CPT

## 2022-05-01 RX ORDER — SODIUM CHLORIDE, SODIUM LACTATE, POTASSIUM CHLORIDE, CALCIUM CHLORIDE 600; 310; 30; 20 MG/100ML; MG/100ML; MG/100ML; MG/100ML
INJECTION, SOLUTION INTRAVENOUS CONTINUOUS
Status: DISCONTINUED | OUTPATIENT
Start: 2022-05-01 | End: 2022-05-04

## 2022-05-01 RX ADMIN — ATORVASTATIN CALCIUM 20 MG: 10 TABLET, FILM COATED ORAL at 19:41

## 2022-05-01 RX ADMIN — PANTOPRAZOLE SODIUM 40 MG: 40 TABLET, DELAYED RELEASE ORAL at 05:50

## 2022-05-01 RX ADMIN — SODIUM CHLORIDE, POTASSIUM CHLORIDE, SODIUM LACTATE AND CALCIUM CHLORIDE: 600; 310; 30; 20 INJECTION, SOLUTION INTRAVENOUS at 10:59

## 2022-05-01 RX ADMIN — SODIUM CHLORIDE, PRESERVATIVE FREE 10 ML: 5 INJECTION INTRAVENOUS at 20:31

## 2022-05-01 RX ADMIN — ANTACID TABLETS 500 MG: 500 TABLET, CHEWABLE ORAL at 15:25

## 2022-05-01 RX ADMIN — CITALOPRAM HYDROBROMIDE 10 MG: 20 TABLET ORAL at 09:37

## 2022-05-01 RX ADMIN — NIFEDIPINE 60 MG: 30 TABLET, FILM COATED, EXTENDED RELEASE ORAL at 09:37

## 2022-05-01 RX ADMIN — CLONIDINE HYDROCHLORIDE 0.3 MG: 0.1 TABLET ORAL at 09:36

## 2022-05-01 RX ADMIN — CLONIDINE HYDROCHLORIDE 0.3 MG: 0.1 TABLET ORAL at 19:41

## 2022-05-01 RX ADMIN — OXYCODONE 5 MG: 5 TABLET ORAL at 09:37

## 2022-05-01 RX ADMIN — SODIUM CHLORIDE, PRESERVATIVE FREE 10 ML: 5 INJECTION INTRAVENOUS at 09:40

## 2022-05-01 RX ADMIN — CEFTRIAXONE SODIUM 1000 MG: 1 INJECTION, POWDER, FOR SOLUTION INTRAMUSCULAR; INTRAVENOUS at 09:37

## 2022-05-01 RX ADMIN — ACETAMINOPHEN 650 MG: 325 TABLET ORAL at 19:41

## 2022-05-01 RX ADMIN — MORPHINE SULFATE 2 MG: 2 INJECTION, SOLUTION INTRAMUSCULAR; INTRAVENOUS at 05:51

## 2022-05-01 RX ADMIN — OXYCODONE 5 MG: 5 TABLET ORAL at 13:47

## 2022-05-01 RX ADMIN — CLONIDINE HYDROCHLORIDE 0.3 MG: 0.1 TABLET ORAL at 15:23

## 2022-05-01 RX ADMIN — OXYCODONE 5 MG: 5 TABLET ORAL at 19:42

## 2022-05-01 RX ADMIN — ALLOPURINOL 100 MG: 100 TABLET ORAL at 09:36

## 2022-05-01 RX ADMIN — PANTOPRAZOLE SODIUM 40 MG: 40 TABLET, DELAYED RELEASE ORAL at 15:27

## 2022-05-01 ASSESSMENT — PAIN DESCRIPTION - ORIENTATION
ORIENTATION: RIGHT
ORIENTATION: RIGHT

## 2022-05-01 ASSESSMENT — PAIN SCALES - GENERAL
PAINLEVEL_OUTOF10: 7
PAINLEVEL_OUTOF10: 5

## 2022-05-01 ASSESSMENT — PAIN DESCRIPTION - DESCRIPTORS
DESCRIPTORS: ACHING
DESCRIPTORS: ACHING

## 2022-05-01 ASSESSMENT — PAIN DESCRIPTION - LOCATION
LOCATION: ABDOMEN
LOCATION: ABDOMEN

## 2022-05-01 NOTE — PROGRESS NOTES
[facility] Progress Note           Subjective/   37y.o. year old male who we are seeing in consultation for CKD-5, HTN.     HPI:  Renal function worsening. Intake adequate. ROS:  BP's better, +flank pain, hematuria clearing, +fevers. Objective/   GEN:  Chronically ill, /82   Pulse 89   Temp 100.9 °F (38.3 °C) (Oral)   Resp 16   Ht 6' (1.829 m)   Wt 220 lb (99.8 kg)   SpO2 94%   BMI 29.84 kg/m²   HEENT: non-icteric, no JVD  CV: S1, S2 without m/r/g; no LE edema  RESP: CTA B without w/r/r; breathing wnl  ABD: +bs, soft, tender no right, distended, no hsm  SKIN: warm, no rashes    Data/  Recent Labs     04/29/22  0649 04/30/22  0601   WBC 11.5* 11.2*   HGB 11.7* 9.3*   HCT 35.0* 28.1*   MCV 85.8 86.8    276     Recent Labs     04/29/22  0649 04/30/22  0601   * 136   K 3.5 3.8   CL 95* 99   CO2 20* 20*   GLUCOSE 124* 130*   BUN 50* 54*   CREATININE 4.8* 5.4*   LABGLOM 13* 12*   GFRAA 16* 14*       Assessment/     - 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 - follows with me in office, slowly progressive, SCr ~5 range   Worsening now in setting of suspected acute pyelonephritis     - Acute right flank pain - Differential diagnosis of acute pyelonephritis, UTI, versus cyst rupture - on ceftriaxone     - Hypertension - uncontrolled, better with adjustments in BP meds     - Renal mass - concern for malignancy, MRI from March 2022 revealing bilateral renal cysts with no evidence of mass, follows with Urology [address]     - Gout    Plan/     - IVF trial  - May need dialysis next week - d/w patient  - Continue nifedipine - titrated dose  - Switched back from clonidine patch to pills three times a day  - Trend labs, bp's, cultures, & urine output      ____________________________________  Jonas Damico MD  [facility]  [email or web address]  Office: [phone]

## 2022-05-01 NOTE — PROGRESS NOTES
Urology Attending Progress Note      Subjective: pain improved    Vitals:  /82   Pulse 89   Temp 100.9 °F (38.3 °C) (Oral)   Resp 16   Ht 6' (1.829 m)   Wt 220 lb (99.8 kg)   SpO2 94%   BMI 29.84 kg/m²   Temp  Av.7 °F (37.6 °C)  Min: 98.6 °F (37 °C)  Max: 100.9 °F (38.3 °C)    Intake/Output Summary (Last 24 hours) at 2022 1121  Last data filed at 2022 0600  Gross per 24 hour   Intake 1190 ml   Output --   Net 1190 ml       Exam: abd distended due to polycystic kidneys    Labs:  WBC:    Lab Results   Component Value Date    WBC 10.0 2022     Hemoglobin/Hematocrit:    Lab Results   Component Value Date    HGB 8.8 2022    HCT 25.5 2022     BMP:    Lab Results   Component Value Date     2022    K 3.7 2022    CL 97 2022    CO2 18 2022    BUN 69 2022    LABALBU 4.6 2022    CREATININE 7.3 2022    CALCIUM 10.1 2022    GFRAA 10 2022    GFRAA >60 2013    LABGLOM 8 2022     PT/INR:    Lab Results   Component Value Date    PROTIME 10.6 2022    INR 0.94 2022     PTT:  No results found for: APTT[APTT    Urine Culture:  No growth to date    Antibiotic Therapy:  Rocephin    Impression/Plan: flank pain  1. Culture negative thus far, if negative at final can stop antibiotics  2.  Ok to d/c home at any point from Urology standpoint    Slime Farris MD

## 2022-05-01 NOTE — PROGRESS NOTES
Physical Medicine & Rehabilitation  Progress Note    Chief Complaint:  left femoral neck impacted subcapital fracture    Subjective:  Pain level is stated to be 6/10 with therapies. Her POA expresses concern about the patient's safe return to home. Patient continues to show good progression with therapies. She has no other concerns or questions at this time. Rehabilitation:   Physical Therapy:  OBJECTIVE:  Bed Mobility:  Rolling to Right: Minimal Assistance, X 1, with verbal cues to bend and move legs   Supine to Sit: Minimal Assistance, X 1, with verbal cues for hand placement  Sit to Supine: Minimal Assistance, X 2 to move torso and legs with verbal cues for hand placement. - Completed transfers twice due to patient needing to use restroom. Min Assist required to guide legs and move torso. Mod vc's required for leg and hand placement throughout to ensure patient safety during completion.     Transfers:  Sit to Stand: Contact Guard Assistance  Stand to [place] [id number]  - Transfers x1 using sofa to simulate home environment. - Max vc's for hand placement required throughout to increase patient safety. Car:Contact Guard Assistance  - Patient educated on not using car door for hand placement while completing car transfer to increase patient safety. Patient showed understanding after given explanation. Friend instructed on how to do car transfers with patient, showed understanding.  - Required mod vc's to square up to seat with all transfers.     Ambulation:  Contact Guard Assistance, with verbal cues   Distance: 80 ft x1; 35 ft x1; 50 ft x1; small distance in apartment room  Surface: Level Tile, Carpet and Ramp  Device:Rolling Walker  Gait Deviations:  Slow Susanna and Decreased Step Length Bilaterally, would not stay in RW TIFFANIE while turning. Patient did step-to gait pattern occasionally.  Patient would either walk too close to RW, or walk outside RW TIFFANIE, took hands off RW several times, Patient Progress Note      PCP: Sarita Sweeney, APRN - CNP    Date of Admission: 4/29/2022    Chief Complaint:   Patient presents with    Flank Pain       pt reports he has chronic kidney disease and has been taking ibuprofen lately. states he has right flank pain and is now urinating blood.         Hospital Course:   Amanda Carlos is a 37 y.o. male who presented to [facility] with right-sided flank pain and gross hematuria. Patient has medical history significant for CKD Stage V secondary to polycystic kidney disease awaiting transplant with [facility], HTN, HLD, GERD and s/p coiling of intracranial aneurysm on 4/15/2022.     Since the procedure, patient has had severe left-sided neck pain which he has attributed to lying on that side for too long. He was recently in the ED at [facility] concerning this pain. Patient has tried pain control with acetaminophen, but has been without relief. He reports over the last week he has been taking ibuprofen, about 2-4 200-mg tablets every 4 hours for the last week for pain relief. He admits that he knows he should not take it with his CKD, but it was the only medication that would give him pain relief.     Right-sided flank pain started last night, characterizes as a sharp, stabbing pain. Pain radiates over the front of his abdomen. Pt states that his abdomen typically feels full due to his PCKD, but has had more pain. Pt states that he has had associated gross hematuria over the past day. Pt denies and dysuria or frequency. Has had some nausea he relates to pain, but no vomiting. Denies fever, chills, or changes with BMs.     ED Workup: Pt found to be very hypertensive, he reports his HTN was previously well controlled on his regimen, but since the surgery and the pain he has had, it has been uncontrolled in the 180s/120s. . Reports highest was noted yesterday at 201/140. Cr 4.8, but unsure of baseline with his disease. Last measured 2/19: 4.5.  Insignificant instructed to keep head up while ambulating with RW, which improved patient's gait pattern, and improved RW advancement.   - Required mod vc's to turn with walker, keep hands on RW, and to keep head up. - Ramp:  x1; CGA to simulate community environment, patient was steady during activity. - Friend showed understanding of proper guarding technique.     Stairs:  Stairs:  6\" steps. X 4 using Bilateral Handrails and Contact Guard Assistance. Patient required mod vc's for foot sequence and hand placement to ensure patient safety. Patient instructed to do non-reciprocal pattern while ascending/descending. Platform:  6\" platform X 1 using Rolling Walker and Contact Guard Assistance, with verbal cues. Patient required mod vc's to advance RW onto platform and for proper sequencing.     Balance:  Dynamic Standing Balance: Contact Guard Assistance, with verbal cues    - Required mod vc's for hand placement and to turn with RW while completing rajan-care and bathroom tasks. Functional Outcome Measures: Not completed     ASSESSMENT:  Assessment: Patient progressing toward established goals. Patient is progressing well towards goals at this time, tolerated session well. Several rest breaks required throughout session due to fatigue. Patient got distracted throughout session. Shows limitations due to generalized BLE weakness and decreased cognition, which affects functional mobility. Additional skilled PT would benefit patient to increase BLE strength/stability, which would improve patient's gait pattern. Further step/platform training would also benefit patient to improve foot sequence remembrance and improve patient safety while out in the community. This would increase patient's safe functional mobility while completing future tasks. Friend showed understanding throughout session and is worried about patient's safety after discharge, asked if SNF would be helpful with constant patient supervision.  PTA stated that after leukocytosis at 11.5. UA with gross hematuria, proteinuria, and leukocyte esterase, but nitrite negative. Reflex to culture was not indicated, but this has been ordered due to patient's right flank pain. He received empiric Abx, ceftriaxone 1x. Subjective:   Patient with need of 2L NC overnight after O2 sat 89%. Patient attributes this to pain is his right abdomen that worsened with deep breaths, so he was taking shallow breaths that could attribute to O2 Sat. Pt otherwise denies and cp or sob. Blood pressures improved this AM.    Patient reports getting better relief after urology added oxycodone 5 mg q 4hrs for pain. Patient also now with an appetite and has ordered food. Patient report he is urinating okay, has had less output overnight where he typically gets up at least 2x a night. States he has had regular output during the day and has been having adequate intake. Medications:  Reviewed    Infusion Medications    sodium chloride       Scheduled Medications    sodium chloride flush  5-40 mL IntraVENous 2 times per day    cefTRIAXone (ROCEPHIN) IV  1,000 mg IntraVENous Q24H    citalopram  10 mg Oral Daily    allopurinol  100 mg Oral Daily    pantoprazole  40 mg Oral BID AC    atorvastatin  20 mg Oral Nightly    NIFEdipine  60 mg Oral Daily    cloNIDine  0.3 mg Oral TID     PRN Meds: oxyCODONE, sodium chloride flush, sodium chloride, ondansetron **OR** ondansetron, acetaminophen **OR** acetaminophen, polyethylene glycol, morphine, labetalol, calcium carbonate      Intake/Output Summary (Last 24 hours) at 5/1/2022 0757  Last data filed at 5/1/2022 0600  Gross per 24 hour   Intake 1810 ml   Output --   Net 1810 ml   5x unmeasured urine.     Physical Exam Performed:    /82   Pulse 89   Temp 100.9 °F (38.3 °C) (Oral)   Resp 16   Ht 6' (1.829 m)   Wt 220 lb (99.8 kg)   SpO2 94%   BMI 29.84 kg/m²     General appearance:  No apparent distress, appears stated age and cooperative. HEENT:  Normal cephalic, atraumatic without obvious deformity. Pupils equal, round, and reactive to light. Extra ocular muscles intact. Conjunctivae/corneas clear. Neck: Supple, with full range of motion. No jugular venous distention. Trachea midline. Respiratory:  Normal respiratory effort. Clear to auscultation, bilaterally without Rales/Wheezes/Rhonchi. Cardiovascular:  Regular rate and rhythm with normal S1/S2 without murmurs, rubs or gallops. Abdomen: Soft, tender. Masses noted: kidneys are palpable anteriorly. Significant right-sided CVA tenderness. Musculoskeletal:  No clubbing, cyanosis or edema bilaterally. Full range of motion without deformity. Skin: Skin color, texture, turgor normal.  No rashes or lesions. Neurologic:  Neurovascularly intact without any focal sensory/motor deficits. Cranial nerves: II-XII intact, grossly non-focal.  Psychiatric:  Alert and oriented, thought content appropriate, normal insight  Capillary Refill: Brisk,3 seconds, normal  Peripheral Pulses: +2 palpable, equal bilaterally       Labs:   Recent Labs     04/29/22  0649 04/30/22  0601   WBC 11.5* 11.2*   HGB 11.7* 9.3*   HCT 35.0* 28.1*    276     Recent Labs     04/29/22  0649 04/30/22  0601   * 136   K 3.5 3.8   CL 95* 99   CO2 20* 20*   BUN 50* 54*   CREATININE 4.8* 5.4*   CALCIUM 10.2 10.1     Recent Labs     04/29/22  0649   AST 12*   ALT 11   BILITOT <0.2   ALKPHOS 84     No results for input(s): INR in the last 72 hours. No results for input(s): Debbie Mahesh in the last 72 hours. Urinalysis:      Lab Results   Component Value Date    NITRU Negative 04/29/2022    WBCUA see below 04/29/2022    BACTERIA Rare 02/18/2022    RBCUA see below 04/29/2022    BLOODU LARGE 04/29/2022    SPECGRAV 1.015 04/29/2022    GLUCOSEU 100 04/29/2022    GLUCOSEU NEGATIVE 09/21/2011       Radiology:  CT ABDOMEN PELVIS WO CONTRAST Additional Contrast? None   Final Result   1.  Unchanged polycystic discharge from SNF, patient would still be at baseline cognitively and wouldn't see much benefit from going to SNF. Will continue to monitor. Activity Tolerance:  Patient tolerance of  treatment: good. Several rest breaks due to fatigue. Equipment Recommendations:Equipment Needed: Yes(RW)  Discharge Recommendations:  Continue to assess pending progress     Occupational Therapy:  COGNITION: Slow Processing, Decreased Recall, Decreased Insight, Impaired Memory, Decreased Problem Solving and Decreased Safety Awareness     ADL:   Feeding: with set-up. For breakfast tray using weighted utensils eating pancakes. Grooming: Stand By Assistance. SBA standing sink side applying deodorant and for hair care. Bathing: Stand By Assistance. SBA washing rajan area/bottom standing with 1 UE release from grab bars. Pt required one cue for recall to wash bottom. Upper Extremity Dressing: Moderate Assistance. ModA donning bra for fasteners. Lower Extremity Dressing: Stand By Assistance. SBA to doff/don socks onto B feet and thread BLE into undergarment/pants with increased time and min cues for problem solving. Assist for [employer] Transfer: Stand By Assistance. Walk-in to/from TTB. min cues for improved safety with walker placement and use of grab bars with good recall exiting shower.     BALANCE:  Sitting Balance:  Modified Independent. Standing Balance: Stand By Assistance. x1-2 minutes within ADL task.     BED MOBILITY:  Not Tested     TRANSFERS:  Sit to Stand:  Stand By Assistance. Stand to Sit: Stand By Assistance. Comment: Mod v/c to refer to visual aide to increase recall of proper hand placement- pt demos poor follow through without cueing.     FUNCTIONAL MOBILITY:  Assistive Device: Rolling Walker  Assist Level:  Stand By Assistance. Distance: Completed functional mobility to/from shower room, BR, and within pt room for clothing retrieval tasks at slow pace, no LOB noted.  Pt requires min cues for walker safety to maneuver within tight spaces- seated rest break after trial of mobility, min fatigue noted.     ADDITIONAL ACTIVITIES:  Pt requesting to do laundry this session to progress towards goal of increasing independence with homemaking tasks. Pt participated in IADL task to ambulate within therapy apartment with use of RW to reach into various planes at high/low levels to retrieve items for laundry tasks. Pt required SBA for balance and min cues for problem solving washer controls. Completed to increase kitchen safety and facilitate functional reaching required for simple meal prep tasks.     Completed BUE exercises x10 reps x1 sets using min resistance band in all joints/planes to increase strength and endurance required for ADLs. Pt required rest break between each exercise and mod v/c for proper technique.     ASSESSMENT:     Activity Tolerance:  Patient tolerance of  treatment: good. Discharge Recommendations: 24 hour supervision or assist, Home with Home health OT, Home with nursing aide   Equipment Recommendations: Equipment Needed: Yes  Other: Pt may benefit from a BSC, shower chair, grab bars in the shower . Need to assess for LHAE. BSC ordered on 9-15. Speech Therapy:  Short-Term Goals:  SHORT TERM GOAL #1:  Goal 1: Pt will complete orientation, immediate/delayed recall and working memory tasks with 80% accuracy given mod cues to improve retention of new and functional information. INTERVENTIONS:   Functional recall: Provided patient with 4 grocery items to recall.  Discussed use of repetition, association making, mental imagery to improve retention of information.   -Immediate recall: 2/3 min cues, 1/3 mod cues  -Recall following a 10 minute delay: 2/2 mod cues  *Patient appears to encode and store information well, but continues to have difficulty with retrieval. Pt benefits from being provided multiple choice options when unable to retrieve missing item.      SHORT TERM GOAL kidney disease with a stable 4.9 cm left renal mass,   again concerning for renal cell carcinoma. 2. Diverticulosis. Assessment/Plan:    Active Hospital Problems    Diagnosis     Pyelonephritis [N12]      Priority: Medium    Polycystic kidney disease [Q61.3]      Priority: Medium    Acute kidney injury superimposed on CKD ([address] [N17.9, N18.9]      Priority: Medium    Uncontrolled hypertension [I10]     Anemia due to stage 5 chronic kidney disease, not on chronic dialysis (HCC) [N18.5, D63.1]      Pyelonephritis,   DAMASO on CKD Stage V with PCKD  Anemia due to CKD  - Though no severe leukocytosis, physical exam concerning for pyelonephritis. - Urology consulted, Pt started empiric abx, cetriaxone q 24 hrs  - Blood and urine cultures with no growth to date. Lactic acid 1.0  - Pain management with morphine 2 mg q 4 hrs prn and oxycodone 5 mg q 4 hrs prn  - Management limited due to CKD and ineffectiveness of acetaminophen  - DVT prophylaxis with SCDs  - ctm CBC for anemia and platelets      Uncontrolled HTN  - Likely related to pain post-operatively  - Continued home nifedipine 60 mg daily and clonidine patch weekly  - Clonidine patch last changed Sunday  - Prn labetalol 20 mg IV available q 3 hrs for for SBP > 170 and DBP > 100  - Nephrology consulted, recommend the above labetalol and nifedipine.  - Discontinue clonidine patch for clonidine tablet, 0.3 mg TID     HLD  - Continue statin, supplemented atorvastatin in place of simvastatin     GERD  - Continue PPI, supplemented pantoprazole in place of omeprazole     Gout  - Continue allopurinol    DVT Prophylaxis: SCDs  Diet: ADULT DIET;  Regular; Low Phosphorus (Less than 1000 mg)  Code Status: Full Code    PT/OT Eval Status: Not ordered    Dispo - 1-2 days    Nasrin Keenan MD  PGY-1 #2:  Goal 2: Pt will complete functional sequencing and thought organizational tasks with 70% accuracy given mod cues to improve overall executive functioning skills. INTERVENTIONS: Did not address due to focus on other goals.      SHORT TERM GOAL #3:  Goal 3: Patient will complete functional problem solving, reasoning and basic computational tasks with 70% accuracy given mod cues to improve overall management of ADLs. INTERVENTIONS:  [facility] advertisement navigation: 1/3 indep, 2/3 mod cues    *Cueing assist needed for comprehension of information on the advertisement, reasoning through pertinent information, comparing sets of information and navigating placement of information on advertisement. *Pt expressed frustration when task questions became more complex or included multiple elements. Long-Term Goals:  Timeframe for Long-term Goals: 3 weeks     LONG TERM GOAL #1:  Goal 1: Pt will improve cognitive linguistic skills to a min assist level in order to improve level of independence in the home environment.       Comprehension: 4 - Patient understands basic needs 75-90%+ of the time  Expression: 5 - Expresses basic ideas/needs only (hungry/hot/pain)  Social Interaction: 5 - Patient is appropriate with supervision/cues  Problem Solving: 3 - Patient solves simple/routine tasks 50%-74%  Memory: 2 - Patient remembers 25%-49% of the time     Review of Systems:  Review of Systems   Constitutional: Negative for activity change, appetite change, fatigue and fever. HENT: Negative for trouble swallowing and voice change. Eyes: Negative. Respiratory: Negative for cough and shortness of breath. Cardiovascular: Negative for leg swelling. Gastrointestinal: Negative for constipation, diarrhea and nausea. Endocrine: Negative for cold intolerance. Genitourinary: Positive for urgency. Negative for frequency. Musculoskeletal: Positive for arthralgias and gait problem. Negative for myalgias.    Skin: Negative Results (from the past 24 hour(s))   POCT glucose    Collection Time: 09/17/20  7:35 AM   Result Value Ref Range    POC Glucose 86 70 - 108 mg/dl     Results for Niyah Manzano (MRN [id number]) as of 9/20/2020 01:04   Ref. Range 9/18/2020 05:46   Sodium Latest Ref Range: 135 - 145 meq/L 139   Potassium Latest Ref Range: 3.5 - 5.2 meq/L 4.9   Chloride Latest Ref Range: 98 - 111 meq/L 103   CO2 Latest Ref Range: 23 - 33 meq/L 26   BUN Latest Ref Range: 7 - 22 mg/dL 25 (H)   Creatinine Latest Ref Range: 0.4 - 1.2 mg/dL 1.3 (H)   Anion Gap Latest Ref Range: 8.0 - 16.0 meq/L 10.0   Est, Glom Filt Rate Latest Units: ml/min/1.73m2 40 (A)   Glucose Latest Ref Range: 70 - 108 mg/dL 86   Calcium Latest Ref Range: 8.5 - 10.5 mg/dL 10.0     Labs Renal Latest Ref Rng & Units 9/18/2020 9/16/2020 9/15/2020 9/14/2020 9/10/2020   BUN 7 - 22 mg/dL 25(H) 25(H) 25(H) 29(H) 25(H)   Cr 0.4 - 1.2 mg/dL 1.3(H) 1. 3(H) 1. 3(H) 1. 3(H) 1.2   K 3.5 - 5.2 meq/L 4.9 4.6 5.1 5.4(H) 4.2   Na 135 - 145 meq/L 139 141 142 140 137      Recent Labs     09/14/20  1055 09/10/20  0617 09/09/20  0809   WBC 7.5 4.9 6.6   HGB 10.5* 9.7* 11.1*   HCT 35.1* 32.1* 35.7*   MCV 95.1 94.7 92.5    186 227      Impression:  1. Ground-level fall after syncopal event. 2. Acute slightly impacted subcapital fracture of the left femoral neck. 3. Status post closed reduction and percutaneous screw fixation of the left hip impacted subcapital fracture of the left femoral neck by DAVID AltamiranoJENNIFER. on 09/06/2020. EBL minimal.  4. Gait instability. 5. Mild TBI. 6. Iron deficiency anemia. 7. Vitamin D deficiency. 8. Insulin-dependent type 2 diabetes, controlled with hyperglycemia. Last hemoglobin A1c was 5.7 on 9/9/2020.  9. Essential tremor. 10. History of prior myocardial infarction in 2011. 11. Hypertension. 12. Hyperlipidemia. 13. Coronary artery disease. 14. History of prior myocardial infarction.   15. CKD 3.  16. Mild torticollis with concavity to the right.  17. Osteoporosis. 18. Moderate to severe cognitive deficits. (MOCA) version 8.2 completed. Patient scored 10/30.  19. Hyperkalemia. 20. Acute kidney injury. 21. Hypercalcemia. 22. Tachycardia.     Plan:      Medical management: Per primary team and Dr. Piero Harman, Internal Medicine, Physical Medicine     Narcotic usage: Tramadol last 24-hour usage none. Decreased. Last BM:   Stool Amount: Small (09/16/20 1011)     FUNCTIONAL OUTCOMES TOOLS:    GODINEZ -      Tinetti - Balance Score: 10  Gait Score: 6  Tinetti Total Score: 16    TUG -       Acute/Rehabilitation Problems:  1. Ground-level fall after syncopal event. 2. Acute slightly impacted subcapital fracture of the left femoral neck. 1. Addressed by surgery below. 3. Status post closed reduction and percutaneous screw fixation of the left hip impacted subcapital fracture of the left femoral neck by Dr. Sarbjit Michaud D.JENNIFER. on 09/06/2020. EBL minimal.  1. WBAT. 2. Wound care. 3. Pain control. 1. PRN and scheduled Tylenol. 2. Lidoderm patches. 3. Tramadol. Patient still requires encouragement to take her pain medications prior to her therapy sessions. 4. Gait instability. 1. PT/OT. 2. Tinetti 16/28. Risk Indicators: Less than/equal to 18 = high risk; 19-23 Moderate risk; Greater than/equal to 24 = low risk. 5. Mild TBI/Moderate to severe cognitive deficits. (MOCA) version 8.2 completed. Patient scored 10/30. 1. SLP. 2. Low stimulation TBI guidelines if deemed necessary. 3. As of 9/15 the patient was felt to have a decline in her cognition; her Isaías Resendiz was queried as to how he perceived her cognition and he stated this is her baseline. 4. Urinalysis was negative for signs of a UTI on 9/15. 6. Iron deficiency anemia. 1. Iron 25 and ferritin 120. Abnormal/normal.  2. Ferrous sulfate and vitamin C twice daily with meals. 7. Tachycardia.   1. Patient had an episode of tachycardia on 9/16 with a heart rate of 130 for which an EKG was ordered that showed sinus tachycardia. After receiving on propranolol her heart rate did come down to less than 100 bpm.  Resolved. 8. Nutrition:  Consultation to dietician for nutritional counseling and recommendations. 1. Protein 5.6 and albumin 2.8 on 9/10/2020. Abnormal.  2. Vitamin 25OH level of 14 on 9/9/2020.  3. Cholecalciferol 5000 IU 3 times daily with meals and 500 mg calcium twice daily with meals. 9. Electrolytes. 1. Normal on 9/14 with exception of.  1. Hyperkalemia with a potassium of 5.4 on 9/14. Potassium decreased to 5.1 on 9/15. Potassium stable at 4.9 on 9/18. 1. Kayexalate 15 mg x 2 ordered on 9/14. 2. Hypercalcemia. 1. Hold supplemental calcium on 9/15. Calcium normal on 9/15 at 10.3. Calcium elevated again at 11.0 on 9/16. Normal at 10.0 on 9/18. Calcium supplementation resumed. 2. PTH normal at 17.4 on 9/16. 10. Bladder: No issues  11. Bowel: Senna, colace, MOM  12. Rehabilitation nursing will be involved for bowel, bladder, skin, and pain management. Nursing will also provide education and training to patient and family. 13. Prophylaxis:  DVT: Plavix and aspirin as directed by Orthopedic Surgery. GI: None. .  14.  and case management consultations for coordination of care and discharge planning.     Chronic Problems:  1. Insulin-dependent type 2 diabetes, controlled with hyperglycemia. Last hemoglobin A1c was 5.7 on 9/9/2020.  1. Lantus 20 units every morning. Lantus increased to 24 units starting on 9/16 to improve blood glucose control. 2. Metformin 1000 mg twice daily with meals. 2. Essential tremor. 1. Continue home primidone and propranolol. 3. History of prior myocardial infarction in 2011.  1. ASA 81 mg.  2. Plavix. 4. Hypertension. 1. Propranolol. 5. Hyperlipidemia. 1. Lipitor. 6. Coronary artery disease/History of prior myocardial infarction. 1. ASA 81 mg.  2. Plavix. 3. Lipitor.   7. ISAURA superimposed on CKD

## 2022-05-02 LAB
ANION GAP SERPL CALCULATED.3IONS-SCNC: 19 MMOL/L (ref 3–16)
BUN BLDV-MCNC: 71 MG/DL (ref 7–20)
CALCIUM SERPL-MCNC: 10.1 MG/DL (ref 8.3–10.6)
CHLORIDE BLD-SCNC: 98 MMOL/L (ref 99–110)
CO2: 20 MMOL/L (ref 21–32)
CREAT SERPL-MCNC: 7.1 MG/DL (ref 0.9–1.3)
GFR AFRICAN AMERICAN: 10
GFR NON-AFRICAN AMERICAN: 9
GLUCOSE BLD-MCNC: 121 MG/DL (ref 70–99)
HCT VFR BLD CALC: 25.2 % (ref 40.5–52.5)
HEMOGLOBIN: 8.7 G/DL (ref 13.5–17.5)
MCH RBC QN AUTO: 29.2 PG (ref 26–34)
MCHC RBC AUTO-ENTMCNC: 34.4 G/DL (ref 31–36)
MCV RBC AUTO: 84.7 FL (ref 80–100)
PDW BLD-RTO: 13.4 % (ref 12.4–15.4)
PLATELET # BLD: 292 K/UL (ref 135–450)
PMV BLD AUTO: 7.8 FL (ref 5–10.5)
POTASSIUM REFLEX MAGNESIUM: 3.8 MMOL/L (ref 3.5–5.1)
RBC # BLD: 2.97 M/UL (ref 4.2–5.9)
SODIUM BLD-SCNC: 137 MMOL/L (ref 136–145)
WBC # BLD: 7.7 K/UL (ref 4–11)

## 2022-05-02 PROCEDURE — 1200000000 HC SEMI PRIVATE

## 2022-05-02 PROCEDURE — 6360000002 HC RX W HCPCS

## 2022-05-02 PROCEDURE — 6370000000 HC RX 637 (ALT 250 FOR IP)

## 2022-05-02 PROCEDURE — 6370000000 HC RX 637 (ALT 250 FOR IP): Performed by: INTERNAL MEDICINE

## 2022-05-02 PROCEDURE — 36415 COLL VENOUS BLD VENIPUNCTURE: CPT

## 2022-05-02 PROCEDURE — 6370000000 HC RX 637 (ALT 250 FOR IP): Performed by: UROLOGY

## 2022-05-02 PROCEDURE — 85027 COMPLETE CBC AUTOMATED: CPT

## 2022-05-02 PROCEDURE — 2580000003 HC RX 258

## 2022-05-02 PROCEDURE — 80048 BASIC METABOLIC PNL TOTAL CA: CPT

## 2022-05-02 RX ORDER — DOCUSATE SODIUM 100 MG/1
100 CAPSULE, LIQUID FILLED ORAL 2 TIMES DAILY
Status: DISCONTINUED | OUTPATIENT
Start: 2022-05-02 | End: 2022-05-03

## 2022-05-02 RX ADMIN — SODIUM CHLORIDE, POTASSIUM CHLORIDE, SODIUM LACTATE AND CALCIUM CHLORIDE: 600; 310; 30; 20 INJECTION, SOLUTION INTRAVENOUS at 15:11

## 2022-05-02 RX ADMIN — NIFEDIPINE 60 MG: 30 TABLET, FILM COATED, EXTENDED RELEASE ORAL at 09:17

## 2022-05-02 RX ADMIN — PANTOPRAZOLE SODIUM 40 MG: 40 TABLET, DELAYED RELEASE ORAL at 08:17

## 2022-05-02 RX ADMIN — ALLOPURINOL 100 MG: 100 TABLET ORAL at 09:16

## 2022-05-02 RX ADMIN — PANTOPRAZOLE SODIUM 40 MG: 40 TABLET, DELAYED RELEASE ORAL at 17:04

## 2022-05-02 RX ADMIN — SODIUM CHLORIDE: 9 INJECTION, SOLUTION INTRAVENOUS at 09:28

## 2022-05-02 RX ADMIN — DOCUSATE SODIUM 100 MG: 100 CAPSULE, LIQUID FILLED ORAL at 09:15

## 2022-05-02 RX ADMIN — CLONIDINE HYDROCHLORIDE 0.3 MG: 0.1 TABLET ORAL at 09:17

## 2022-05-02 RX ADMIN — SODIUM CHLORIDE, POTASSIUM CHLORIDE, SODIUM LACTATE AND CALCIUM CHLORIDE: 600; 310; 30; 20 INJECTION, SOLUTION INTRAVENOUS at 10:06

## 2022-05-02 RX ADMIN — ATORVASTATIN CALCIUM 20 MG: 10 TABLET, FILM COATED ORAL at 20:52

## 2022-05-02 RX ADMIN — OXYCODONE 5 MG: 5 TABLET ORAL at 13:47

## 2022-05-02 RX ADMIN — OXYCODONE 5 MG: 5 TABLET ORAL at 20:52

## 2022-05-02 RX ADMIN — CEFTRIAXONE SODIUM 1000 MG: 1 INJECTION, POWDER, FOR SOLUTION INTRAMUSCULAR; INTRAVENOUS at 09:29

## 2022-05-02 RX ADMIN — DOCUSATE SODIUM 100 MG: 100 CAPSULE, LIQUID FILLED ORAL at 20:52

## 2022-05-02 RX ADMIN — CLONIDINE HYDROCHLORIDE 0.3 MG: 0.1 TABLET ORAL at 20:52

## 2022-05-02 RX ADMIN — CLONIDINE HYDROCHLORIDE 0.3 MG: 0.1 TABLET ORAL at 13:47

## 2022-05-02 RX ADMIN — OXYCODONE 5 MG: 5 TABLET ORAL at 01:05

## 2022-05-02 RX ADMIN — CITALOPRAM HYDROBROMIDE 10 MG: 20 TABLET ORAL at 09:16

## 2022-05-02 ASSESSMENT — PAIN DESCRIPTION - PAIN TYPE: TYPE: ACUTE PAIN

## 2022-05-02 ASSESSMENT — PAIN SCALES - GENERAL
PAINLEVEL_OUTOF10: 5
PAINLEVEL_OUTOF10: 1
PAINLEVEL_OUTOF10: 5
PAINLEVEL_OUTOF10: 6
PAINLEVEL_OUTOF10: 5

## 2022-05-02 ASSESSMENT — PAIN DESCRIPTION - ORIENTATION
ORIENTATION: RIGHT

## 2022-05-02 ASSESSMENT — PAIN DESCRIPTION - LOCATION
LOCATION: FLANK;BACK
LOCATION: ABDOMEN;FLANK
LOCATION: ABDOMEN

## 2022-05-02 ASSESSMENT — PAIN DESCRIPTION - DESCRIPTORS: DESCRIPTORS: ACHING

## 2022-05-02 NOTE — PROGRESS NOTES
[facility] Progress Note           Subjective/   37y.o. year old male who we are seeing in consultation for CKD-5, HTN.     HPI:  The patient was seen and examined; he feels well today with no CP, SOB, nausea or vomiting. ROS: No fever or chills. Social: No family at bedside.     Objective/   GEN:  Chronically ill, /84   Pulse 76   Temp 98.1 °F (36.7 °C) (Oral)   Resp 18   Ht 6' (1.829 m)   Wt 220 lb (99.8 kg)   SpO2 95%   BMI 29.84 kg/m²      HEENT: non-icteric, no JVD  CV: S1, S2 without m/r/g; no LE edema  RESP: CTA B without w/r/r; breathing wnl  ABD: +bs, soft, tender no right, distended, no hsm  SKIN: warm, no rashes    Data/  Recent Labs     04/30/22  0601 05/01/22  0936 05/02/22  0648   WBC 11.2* 10.0 7.7   HGB 9.3* 8.8* 8.7*   HCT 28.1* 25.5* 25.2*   MCV 86.8 84.7 84.7    292 292     Recent Labs     04/30/22  0601 05/01/22  0936 05/02/22  0648    134* 137   K 3.8 3.7 3.8   CL 99 97* 98*   CO2 20* 18* 20*   GLUCOSE 130* 99 121*   BUN 54* 69* 71*   CREATININE 5.4* 7.3* 7.1*   LABGLOM 12* 8* 9*   GFRAA 14* 10* 10*       Assessment/     - 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 - follows with me in office, slowly progressive, SCr ~5 range   Worsening now in setting of suspected acute pyelonephritis     - Acute right flank pain - Differential diagnosis of acute pyelonephritis, UTI, versus cyst rupture - on ceftriaxone     - Hypertension - uncontrolled, better with adjustments in BP meds     - Renal mass - concern for malignancy, MRI from March 2022 revealing bilateral renal cysts with no evidence of mass, follows with Urology at [address]     - Gout    Plan/     - Continue IV fluids for now  - Creatinine stabilized, will hold off on hemodialysis initiation   - Trend labs, bp's, cultures, & urine output

## 2022-05-02 NOTE — PROGRESS NOTES
Progress Note      PCP: Calos Mcrae, APRN - CNP    Date of Admission: 4/29/2022    Chief Complaint:   Patient presents with    Flank Pain       pt reports he has chronic kidney disease and has been taking ibuprofen lately. states he has right flank pain and is now urinating blood.         Hospital Course:   Lulu Deshpande is a 37 y.o. male who presented to [facility] with right-sided flank pain and gross hematuria. Patient has medical history significant for CKD Stage V secondary to polycystic kidney disease awaiting transplant with [facility], HTN, HLD, GERD and s/p coiling of intracranial aneurysm on 4/15/2022.     Since the procedure, patient has had severe left-sided neck pain which he has attributed to lying on that side for too long. He was recently in the ED at [address] [place] [address] concerning this pain. Patient has tried pain control with acetaminophen, but has been without relief. He reports over the last week he has been taking ibuprofen, about 2-4 200-mg tablets every 4 hours for the last week for pain relief. He admits that he knows he should not take it with his CKD, but it was the only medication that would give him pain relief.     Right-sided flank pain started last night, characterizes as a sharp, stabbing pain. Pain radiates over the front of his abdomen. Pt states that his abdomen typically feels full due to his PCKD, but has had more pain. Pt states that he has had associated gross hematuria over the past day. Pt denies and dysuria or frequency. Has had some nausea he relates to pain, but no vomiting. Denies fever, chills, or changes with BMs.     ED Workup: Pt found to be very hypertensive, he reports his HTN was previously well controlled on his regimen, but since the surgery and the pain he has had, it has been uncontrolled in the 180s/120s. . Reports highest was noted yesterday at 201/140. Cr 4.8, but unsure of baseline with his disease. Last measured 2/19: 4.5.  Insignificant leukocytosis at 11.5. UA with gross hematuria, proteinuria, and leukocyte esterase, but nitrite negative. Reflex to culture was not indicated, but this has been ordered due to patient's right flank pain. He received empiric Abx, ceftriaxone 1x. Subjective:   No acute events overnight. Pain well managed at a 2-3 without any medication since 0105. Patient is eating well, denies nausea and vomiting. No SOB or pain when breathing in any longer. Continues to have good urination, with gross hematuria slightly improving. Pt without a BM since Thursday, he took a home laxative (sennasoid) last night to help. Medications:  Reviewed    Infusion Medications    lactated ringers 75 mL/hr at 05/01/22 1059    sodium chloride       Scheduled Medications    sodium chloride flush  5-40 mL IntraVENous 2 times per day    cefTRIAXone (ROCEPHIN) IV  1,000 mg IntraVENous Q24H    citalopram  10 mg Oral Daily    allopurinol  100 mg Oral Daily    pantoprazole  40 mg Oral BID AC    atorvastatin  20 mg Oral Nightly    NIFEdipine  60 mg Oral Daily    cloNIDine  0.3 mg Oral TID     PRN Meds: oxyCODONE, sodium chloride flush, sodium chloride, ondansetron **OR** ondansetron, acetaminophen **OR** acetaminophen, polyethylene glycol, morphine, labetalol, calcium carbonate      Intake/Output Summary (Last 24 hours) at 5/2/2022 0747  Last data filed at 5/2/2022 0100  Gross per 24 hour   Intake 240 ml   Output 400 ml   Net -160 ml   5x unmeasured urine. Physical Exam Performed:    /77   Pulse 75   Temp 97.5 °F (36.4 °C) (Axillary)   Resp 16   Ht 6' (1.829 m)   Wt 220 lb (99.8 kg)   SpO2 92%   BMI 29.84 kg/m²     General appearance:  No apparent distress, appears stated age and cooperative. HEENT:  Normal cephalic, atraumatic without obvious deformity. Pupils equal, round, and reactive to light. Extra ocular muscles intact. Conjunctivae/corneas clear. Neck: Supple, with full range of motion.  No jugular venous distention. Trachea midline. Respiratory:  Normal respiratory effort. Clear to auscultation, bilaterally without Rales/Wheezes/Rhonchi. Cardiovascular:  Regular rate and rhythm with normal S1/S2 without murmurs, rubs or gallops. Abdomen: Soft, non-tender to palpation. Masses noted: kidneys are palpable anteriorly. No more right-sided CVA tenderness. Musculoskeletal:  No clubbing, cyanosis or edema bilaterally. Full range of motion without deformity. Skin: Skin color, texture, turgor normal.  No rashes or lesions. Neurologic:  Neurovascularly intact without any focal sensory/motor deficits. Cranial nerves: II-XII intact, grossly non-focal.  Psychiatric:  Alert and oriented, thought content appropriate, normal insight  Capillary Refill: Brisk,3 seconds, normal  Peripheral Pulses: +2 palpable, equal bilaterally       Labs:   Recent Labs     04/30/22  0601 05/01/22  0936 05/02/22  0648   WBC 11.2* 10.0 7.7   HGB 9.3* 8.8* 8.7*   HCT 28.1* 25.5* 25.2*    292 292     Recent Labs     04/30/22  0601 05/01/22  0936 05/02/22  0648    134* 137   K 3.8 3.7 3.8   CL 99 97* 98*   CO2 20* 18* 20*   BUN 54* 69* 71*   CREATININE 5.4* 7.3* 7.1*   CALCIUM 10.1 10.1 10.1     No results for input(s): AST, ALT, BILIDIR, BILITOT, ALKPHOS in the last 72 hours. No results for input(s): INR in the last 72 hours. No results for input(s): Shellia Bugler in the last 72 hours. Urinalysis:      Lab Results   Component Value Date    NITRU Negative 04/29/2022    WBCUA see below 04/29/2022    BACTERIA Rare 02/18/2022    RBCUA see below 04/29/2022    BLOODU LARGE 04/29/2022    SPECGRAV 1.015 04/29/2022    GLUCOSEU 100 04/29/2022    GLUCOSEU NEGATIVE 09/21/2011       Radiology:  CT ABDOMEN PELVIS WO CONTRAST Additional Contrast? None   Final Result   1. Unchanged polycystic kidney disease with a stable 4.9 cm left renal mass,   again concerning for renal cell carcinoma. 2. Diverticulosis. Assessment/Plan:    Active Hospital Problems    Diagnosis     Pyelonephritis [N12]      Priority: Medium    Polycystic kidney disease [Q61.3]      Priority: Medium    Acute kidney injury superimposed on CKD ([facility] [address] [N17.9, N18.9]      Priority: Medium    Uncontrolled hypertension [I10]     Anemia due to stage 5 chronic kidney disease, not on chronic dialysis (HCC) [N18.5, D63.1]      Pyelonephritis,   DAMASO on CKD Stage V with PCKD  Anemia due to CKD  - Though no severe leukocytosis, physical exam concerning for pyelonephritis. - Leukocytosis significantly improved with Abx  - Urology consulted, Pt started empiric abx, cetriaxone q 24 hrs  - Blood and urine cultures with no growth to date. Lactic acid 1.0  - Patient completed 4 days IV antibiotics, will discontinue due to no infection found  - Pain management with morphine 2 mg q 4 hrs prn and oxycodone 5 mg q 4 hrs prn, and acetaminophen 650 mg q 6 hrs. Pain significantly improved  - DVT prophylaxis with SCDs  - Worsening Cr, nephrology discussed possible dialysis this week. - DAMASO on CKD likely intrinsic due to recent inappropriate use of ibuprofen   - Due to initial presentation of right-sided flank pain, gross hematuria, and low-grade fever, will discuss with nephrology the utility of renal doppler in this setting to rule-out infarction. Pt will need to be NPO for 8 hrs prior to imaging  - ctm CBC for anemia and platelets      Uncontrolled HTN  - Likely related to pain post-operatively  - Continued home nifedipine 60 mg daily and clonidine patch weekly  - Clonidine patch last changed Sunday  - Prn labetalol 20 mg IV available q 3 hrs for for SBP > 170 and DBP > 100  - Nephrology consulted, recommend the above labetalol and nifedipine.  - Discontinue clonidine patch for clonidine tablet, 0.3 mg TID    Constipation  - Without BM since Thursday  - Advised patient to not continue his home laxative  - Colace scheduled BID.  Glycolax available, but patient dislikes drinking it. HLD  - Continue statin, supplemented atorvastatin in place of simvastatin     GERD  - Continue PPI, supplemented pantoprazole in place of omeprazole     Gout  - Continue allopurinol    DVT Prophylaxis: SCDs  Diet: ADULT DIET;  Regular; Low Phosphorus (Less than 1000 mg)  Code Status: Full Code    PT/OT Eval Status: Not ordered    Dispo - 1-2 days    Paulo Franklin MD  PGY-1

## 2022-05-03 ENCOUNTER — APPOINTMENT (OUTPATIENT)
Dept: VASCULAR LAB | Age: 42
DRG: 682 | End: 2022-05-03
Payer: COMMERCIAL

## 2022-05-03 LAB
ANION GAP SERPL CALCULATED.3IONS-SCNC: 17 MMOL/L (ref 3–16)
BLOOD CULTURE, ROUTINE: NORMAL
BUN BLDV-MCNC: 63 MG/DL (ref 7–20)
CALCIUM SERPL-MCNC: 10.3 MG/DL (ref 8.3–10.6)
CHLORIDE BLD-SCNC: 102 MMOL/L (ref 99–110)
CO2: 22 MMOL/L (ref 21–32)
CREAT SERPL-MCNC: 6.5 MG/DL (ref 0.9–1.3)
CULTURE, BLOOD 2: NORMAL
GFR AFRICAN AMERICAN: 12
GFR NON-AFRICAN AMERICAN: 10
GLUCOSE BLD-MCNC: 115 MG/DL (ref 70–99)
HCT VFR BLD CALC: 25.3 % (ref 40.5–52.5)
HEMOGLOBIN: 8.6 G/DL (ref 13.5–17.5)
IRON SATURATION: 7 % (ref 20–50)
IRON: 12 UG/DL (ref 59–158)
MCH RBC QN AUTO: 29.3 PG (ref 26–34)
MCHC RBC AUTO-ENTMCNC: 34.2 G/DL (ref 31–36)
MCV RBC AUTO: 85.7 FL (ref 80–100)
PDW BLD-RTO: 13.1 % (ref 12.4–15.4)
PLATELET # BLD: 321 K/UL (ref 135–450)
PMV BLD AUTO: 7.6 FL (ref 5–10.5)
POTASSIUM REFLEX MAGNESIUM: 3.8 MMOL/L (ref 3.5–5.1)
RBC # BLD: 2.95 M/UL (ref 4.2–5.9)
SODIUM BLD-SCNC: 141 MMOL/L (ref 136–145)
TOTAL IRON BINDING CAPACITY: 179 UG/DL (ref 260–445)
WBC # BLD: 5.7 K/UL (ref 4–11)

## 2022-05-03 PROCEDURE — 83540 ASSAY OF IRON: CPT

## 2022-05-03 PROCEDURE — 85027 COMPLETE CBC AUTOMATED: CPT

## 2022-05-03 PROCEDURE — 2580000003 HC RX 258

## 2022-05-03 PROCEDURE — 93975 VASCULAR STUDY: CPT

## 2022-05-03 PROCEDURE — 1200000000 HC SEMI PRIVATE

## 2022-05-03 PROCEDURE — 6370000000 HC RX 637 (ALT 250 FOR IP)

## 2022-05-03 PROCEDURE — 83550 IRON BINDING TEST: CPT

## 2022-05-03 PROCEDURE — 36415 COLL VENOUS BLD VENIPUNCTURE: CPT

## 2022-05-03 PROCEDURE — 6370000000 HC RX 637 (ALT 250 FOR IP): Performed by: STUDENT IN AN ORGANIZED HEALTH CARE EDUCATION/TRAINING PROGRAM

## 2022-05-03 PROCEDURE — 6370000000 HC RX 637 (ALT 250 FOR IP): Performed by: UROLOGY

## 2022-05-03 PROCEDURE — 80048 BASIC METABOLIC PNL TOTAL CA: CPT

## 2022-05-03 PROCEDURE — 6370000000 HC RX 637 (ALT 250 FOR IP): Performed by: INTERNAL MEDICINE

## 2022-05-03 RX ORDER — POLYETHYLENE GLYCOL 3350 17 G/17G
17 POWDER, FOR SOLUTION ORAL DAILY
Status: DISCONTINUED | OUTPATIENT
Start: 2022-05-03 | End: 2022-05-04

## 2022-05-03 RX ADMIN — OXYCODONE 5 MG: 5 TABLET ORAL at 19:50

## 2022-05-03 RX ADMIN — PANTOPRAZOLE SODIUM 40 MG: 40 TABLET, DELAYED RELEASE ORAL at 07:25

## 2022-05-03 RX ADMIN — CLONIDINE HYDROCHLORIDE 0.3 MG: 0.1 TABLET ORAL at 10:18

## 2022-05-03 RX ADMIN — NIFEDIPINE 60 MG: 30 TABLET, FILM COATED, EXTENDED RELEASE ORAL at 10:17

## 2022-05-03 RX ADMIN — OXYCODONE 5 MG: 5 TABLET ORAL at 07:27

## 2022-05-03 RX ADMIN — ACETAMINOPHEN 650 MG: 325 TABLET ORAL at 14:54

## 2022-05-03 RX ADMIN — ATORVASTATIN CALCIUM 20 MG: 10 TABLET, FILM COATED ORAL at 19:50

## 2022-05-03 RX ADMIN — ALLOPURINOL 100 MG: 100 TABLET ORAL at 10:17

## 2022-05-03 RX ADMIN — SODIUM CHLORIDE, POTASSIUM CHLORIDE, SODIUM LACTATE AND CALCIUM CHLORIDE: 600; 310; 30; 20 INJECTION, SOLUTION INTRAVENOUS at 18:25

## 2022-05-03 RX ADMIN — POLYETHYLENE GLYCOL 3350 17 G: 17 POWDER, FOR SOLUTION ORAL at 15:32

## 2022-05-03 RX ADMIN — SODIUM CHLORIDE, PRESERVATIVE FREE 10 ML: 5 INJECTION INTRAVENOUS at 11:56

## 2022-05-03 RX ADMIN — CITALOPRAM HYDROBROMIDE 10 MG: 20 TABLET ORAL at 10:19

## 2022-05-03 RX ADMIN — CLONIDINE HYDROCHLORIDE 0.3 MG: 0.1 TABLET ORAL at 14:55

## 2022-05-03 RX ADMIN — SODIUM CHLORIDE, POTASSIUM CHLORIDE, SODIUM LACTATE AND CALCIUM CHLORIDE: 600; 310; 30; 20 INJECTION, SOLUTION INTRAVENOUS at 04:05

## 2022-05-03 RX ADMIN — DOCUSATE SODIUM 100 MG: 100 CAPSULE, LIQUID FILLED ORAL at 10:17

## 2022-05-03 RX ADMIN — OXYCODONE 5 MG: 5 TABLET ORAL at 14:54

## 2022-05-03 RX ADMIN — PANTOPRAZOLE SODIUM 40 MG: 40 TABLET, DELAYED RELEASE ORAL at 15:33

## 2022-05-03 RX ADMIN — CLONIDINE HYDROCHLORIDE 0.3 MG: 0.1 TABLET ORAL at 19:50

## 2022-05-03 ASSESSMENT — PAIN DESCRIPTION - PAIN TYPE: TYPE: ACUTE PAIN

## 2022-05-03 ASSESSMENT — PAIN SCALES - GENERAL
PAINLEVEL_OUTOF10: 5

## 2022-05-03 ASSESSMENT — PAIN DESCRIPTION - ORIENTATION
ORIENTATION: RIGHT

## 2022-05-03 ASSESSMENT — PAIN DESCRIPTION - DESCRIPTORS
DESCRIPTORS: ACHING
DESCRIPTORS_2: ACHING

## 2022-05-03 ASSESSMENT — PAIN DESCRIPTION - INTENSITY: RATING_2: 10

## 2022-05-03 ASSESSMENT — PAIN DESCRIPTION - LOCATION
LOCATION: ABDOMEN
LOCATION_2: HEAD
LOCATION: HEAD;BACK;FLANK
LOCATION: ABDOMEN;FLANK;HEAD

## 2022-05-03 NOTE — PROGRESS NOTES
Progress Note      PCP: Gt Bangura, APRN - CNP    Date of Admission: 4/29/2022    Chief Complaint:   Patient presents with    Flank Pain       pt reports he has chronic kidney disease and has been taking ibuprofen lately. states he has right flank pain and is now urinating blood.         Hospital Course:   Yris Tao is a 37 y.o. male who presented to [facility] with right-sided flank pain and gross hematuria. Patient has medical history significant for CKD Stage V secondary to polycystic kidney disease awaiting transplant with [facility], HTN, HLD, GERD and s/p coiling of intracranial aneurysm on 4/15/2022.     Since the procedure, patient has had severe left-sided neck pain which he has attributed to lying on that side for too long. He was recently in the ED at [facility] concerning this pain. Patient has tried pain control with acetaminophen, but has been without relief. He reports over the last week he has been taking ibuprofen, about 2-4 200-mg tablets every 4 hours for the last week for pain relief. He admits that he knows he should not take it with his CKD, but it was the only medication that would give him pain relief.     Right-sided flank pain started last night, characterizes as a sharp, stabbing pain. Pain radiates over the front of his abdomen. Pt states that his abdomen typically feels full due to his PCKD, but has had more pain. Pt states that he has had associated gross hematuria over the past day. Pt denies and dysuria or frequency. Has had some nausea he relates to pain, but no vomiting. Denies fever, chills, or changes with BMs.     ED Workup: Pt found to be very hypertensive, he reports his HTN was previously well controlled on his regimen, but since the surgery and the pain he has had, it has been uncontrolled in the 180s/120s. . Reports highest was noted yesterday at 201/140. Cr 4.8, but unsure of baseline with his disease. Last measured 2/19: 4.5.  Insignificant leukocytosis at 11.5. UA with gross hematuria, proteinuria, and leukocyte esterase, but nitrite negative. Reflex to culture was not indicated, but this has been ordered due to patient's right flank pain. He received empiric Abx, ceftriaxone 1x. Subjective:   No acute events overnight. Pt NPO since midnight in anticipation for renal doppler study today. Denies any severe flank pain, but does have some lower bilateral back pain he relates to typical his ADPCKD pain. Denies chest pain, sob, nausea, or vomiting. Very good urine output, feels like he is more adequately hydrated since his girlfriend brought in his favorite drink yesterday. Still without a BM. Medications:  Reviewed    Infusion Medications    lactated ringers 75 mL/hr at 05/03/22 0405    sodium chloride 100 mL/hr at 05/02/22 9005     Scheduled Medications    docusate sodium  100 mg Oral BID    sodium chloride flush  5-40 mL IntraVENous 2 times per day    citalopram  10 mg Oral Daily    allopurinol  100 mg Oral Daily    pantoprazole  40 mg Oral BID AC    atorvastatin  20 mg Oral Nightly    NIFEdipine  60 mg Oral Daily    cloNIDine  0.3 mg Oral TID     PRN Meds: oxyCODONE, sodium chloride flush, sodium chloride, ondansetron **OR** ondansetron, acetaminophen **OR** acetaminophen, polyethylene glycol, morphine, labetalol, calcium carbonate      Intake/Output Summary (Last 24 hours) at 5/3/2022 0759  Last data filed at 5/3/2022 0727  Gross per 24 hour   Intake 1681 ml   Output 6325 ml   Net -4644 ml   5x unmeasured urine. Physical Exam Performed:    BP (!) 137/93   Pulse 77   Temp 98.2 °F (36.8 °C) (Oral)   Resp 16   Ht 6' (1.829 m)   Wt 224 lb 10.4 oz (101.9 kg)   SpO2 95%   BMI 30.47 kg/m²     General appearance:  No apparent distress, appears stated age and cooperative. HEENT:  Normal cephalic, atraumatic without obvious deformity. Pupils equal, round, and reactive to light. Extra ocular muscles intact. mass,   again concerning for renal cell carcinoma. 2. Diverticulosis. Assessment/Plan:    Active Hospital Problems    Diagnosis     Pyelonephritis [N12]      Priority: Medium    Polycystic kidney disease [Q61.3]      Priority: Medium    Acute kidney injury superimposed on CKD ([address] [N17.9, N18.9]      Priority: Medium    Uncontrolled hypertension [I10]     Anemia due to stage 5 chronic kidney disease, not on chronic dialysis (HCC) [N18.5, D63.1]      Pyelonephritis,   DAMASO on CKD Stage V with PCKD  Anemia due to CKD  - Though no severe leukocytosis, physical exam concerning for pyelonephritis. - Leukocytosis significantly improved with Abx  - Urology consulted, Pt started empiric abx, cetriaxone q 24 hrs  - Blood and urine cultures with no growth to date. Lactic acid 1.0  - Patient completed 4 days IV antibiotics, will discontinue due to no infection found  - Pain management with morphine 2 mg q 4 hrs prn and oxycodone 5 mg q 4 hrs prn, and acetaminophen 650 mg q 6 hrs. Pain significantly improved  - DVT prophylaxis with SCDs  - Worsening Cr, nephrology discussed possible dialysis but patient declined starting  5/3: Cr has improved with adequate hydration. Continue IVF with good oral intake  - DAMASO on CKD likely intrinsic due to recent inappropriate use of ibuprofen   - Due to initial presentation of right-sided flank pain, gross hematuria, and low-grade fever, ordered renal doppler in this setting to rule-out infarction.  Pt will need to be NPO for 8 hrs prior to imaging  - Renal doppler study final read pending  - ctm CBC for anemia and platelets      Uncontrolled HTN  - Likely related to pain post-operatively  - Continued home nifedipine 60 mg daily and clonidine patch weekly  - Clonidine patch last changed Sunday  - Prn labetalol 20 mg IV available q 3 hrs for for SBP > 170 and DBP > 100  - Nephrology consulted, recommend the above labetalol and nifedipine.  - Discontinue clonidine patch for clonidine tablet, 0.3 mg TID    Constipation  - Without BM since Thursday  - Advised patient to not continue his home laxative  - Colace scheduled BID. Glycolax available, but patient dislikes drinking it. HLD  - Continue statin, supplemented atorvastatin in place of simvastatin     GERD  - Continue PPI, supplemented pantoprazole in place of omeprazole     Gout  - Continue allopurinol    DVT Prophylaxis: SCDs  Diet: ADULT DIET;  Regular; Low Phosphorus (Less than 1000 mg)  Code Status: Full Code    PT/OT Eval Status: Not ordered    Dispo - Tomorrow pending improvement in DAMASO    Rosibel Matta MD  PGY-1

## 2022-05-03 NOTE — PROGRESS NOTES
[facility] Progress Note           Subjective/   37y.o. year old male who we are seeing in consultation for CKD-5, HTN.     HPI:  The patient was seen and examined; he feels well today with no CP, SOB, nausea or vomiting. ROS: No fever or chills. Social: No family at bedside.     Objective/   GEN:  Chronically ill, BP (!) 137/93   Pulse 77   Temp 98.2 °F (36.8 °C) (Oral)   Resp 16   Ht 6' (1.829 m)   Wt 224 lb 10.4 oz (101.9 kg)   SpO2 95%   BMI 30.47 kg/m²      HEENT: non-icteric, no JVD  CV: S1, S2 without m/r/g; no LE edema  RESP: CTA B without w/r/r; breathing wnl  ABD: +bs, soft, tender no right, distended, no hsm  SKIN: warm, no rashes    Data/  Recent Labs     05/01/22  0936 05/02/22  0648 05/03/22  0752   WBC 10.0 7.7 5.7   HGB 8.8* 8.7* 8.6*   HCT 25.5* 25.2* 25.3*   MCV 84.7 84.7 85.7    292 321     Recent Labs     05/01/22  0936 05/02/22  0648 05/03/22  0752   * 137 141   K 3.7 3.8 3.8   CL 97* 98* 102   CO2 18* 20* 22   GLUCOSE 99 121* 115*   BUN 69* 71* 63*   CREATININE 7.3* 7.1* 6.5*   LABGLOM 8* 9* 10*   GFRAA 10* 10* 12*       Assessment/     - 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 - follows with me in office, slowly progressive, SCr ~5 range   Worsening now in setting of suspected acute pyelonephritis     - Acute right flank pain - Differential diagnosis of acute pyelonephritis, UTI, versus cyst rupture - on ceftriaxone     - Hypertension - uncontrolled, better with adjustments in BP meds     - Renal mass - concern for malignancy, MRI from March 2022 revealing bilateral renal cysts with no evidence of mass, follows with Urology at [address] [place] [address]     - Gout    Plan/     - Continue IV fluids for now  - Creatinine slowly improving, no indication for hemodialysis at this time   - Trend labs, bp's, cultures, & urine output

## 2022-05-04 VITALS
OXYGEN SATURATION: 96 % | DIASTOLIC BLOOD PRESSURE: 85 MMHG | RESPIRATION RATE: 16 BRPM | TEMPERATURE: 98.5 F | HEART RATE: 73 BPM | WEIGHT: 224.65 LBS | HEIGHT: 72 IN | SYSTOLIC BLOOD PRESSURE: 161 MMHG | BODY MASS INDEX: 30.43 KG/M2

## 2022-05-04 PROBLEM — D50.0 IRON DEFICIENCY ANEMIA DUE TO CHRONIC BLOOD LOSS: Status: ACTIVE | Noted: 2022-05-04

## 2022-05-04 PROBLEM — R31.0 GROSS HEMATURIA: Status: ACTIVE | Noted: 2022-05-04

## 2022-05-04 LAB
ANION GAP SERPL CALCULATED.3IONS-SCNC: 16 MMOL/L (ref 3–16)
BUN BLDV-MCNC: 56 MG/DL (ref 7–20)
CALCIUM SERPL-MCNC: 9.8 MG/DL (ref 8.3–10.6)
CHLORIDE BLD-SCNC: 101 MMOL/L (ref 99–110)
CO2: 22 MMOL/L (ref 21–32)
CREAT SERPL-MCNC: 5.4 MG/DL (ref 0.9–1.3)
FERRITIN: 151.1 NG/ML (ref 30–400)
GFR AFRICAN AMERICAN: 14
GFR NON-AFRICAN AMERICAN: 12
GLUCOSE BLD-MCNC: 125 MG/DL (ref 70–99)
HCT VFR BLD CALC: 22.4 % (ref 40.5–52.5)
HCT VFR BLD CALC: 22.5 % (ref 40.5–52.5)
HCT VFR BLD CALC: 27.1 % (ref 40.5–52.5)
HEMOGLOBIN: 7.8 G/DL (ref 13.5–17.5)
HEMOGLOBIN: 9 G/DL (ref 13.5–17.5)
IMMATURE RETIC FRACT: 0.46 (ref 0.21–0.37)
MCH RBC QN AUTO: 29.8 PG (ref 26–34)
MCHC RBC AUTO-ENTMCNC: 35 G/DL (ref 31–36)
MCV RBC AUTO: 85 FL (ref 80–100)
PDW BLD-RTO: 13 % (ref 12.4–15.4)
PLATELET # BLD: 334 K/UL (ref 135–450)
PMV BLD AUTO: 7.4 FL (ref 5–10.5)
POTASSIUM REFLEX MAGNESIUM: 3.7 MMOL/L (ref 3.5–5.1)
RBC # BLD: 2.63 M/UL (ref 4.2–5.9)
RETICULOCYTE ABSOLUTE COUNT: 0.04 M/UL
RETICULOCYTE COUNT PCT: 1.5 % (ref 0.5–2.18)
SODIUM BLD-SCNC: 139 MMOL/L (ref 136–145)
WBC # BLD: 5.4 K/UL (ref 4–11)

## 2022-05-04 PROCEDURE — 6370000000 HC RX 637 (ALT 250 FOR IP): Performed by: STUDENT IN AN ORGANIZED HEALTH CARE EDUCATION/TRAINING PROGRAM

## 2022-05-04 PROCEDURE — 6370000000 HC RX 637 (ALT 250 FOR IP)

## 2022-05-04 PROCEDURE — 85018 HEMOGLOBIN: CPT

## 2022-05-04 PROCEDURE — 85014 HEMATOCRIT: CPT

## 2022-05-04 PROCEDURE — 6370000000 HC RX 637 (ALT 250 FOR IP): Performed by: UROLOGY

## 2022-05-04 PROCEDURE — 85027 COMPLETE CBC AUTOMATED: CPT

## 2022-05-04 PROCEDURE — 6360000002 HC RX W HCPCS

## 2022-05-04 PROCEDURE — 36415 COLL VENOUS BLD VENIPUNCTURE: CPT

## 2022-05-04 PROCEDURE — 2580000003 HC RX 258

## 2022-05-04 PROCEDURE — 85045 AUTOMATED RETICULOCYTE COUNT: CPT

## 2022-05-04 PROCEDURE — 6370000000 HC RX 637 (ALT 250 FOR IP): Performed by: INTERNAL MEDICINE

## 2022-05-04 PROCEDURE — 82728 ASSAY OF FERRITIN: CPT

## 2022-05-04 PROCEDURE — 80048 BASIC METABOLIC PNL TOTAL CA: CPT

## 2022-05-04 RX ORDER — FERROUS SULFATE 325(65) MG
325 TABLET ORAL 2 TIMES DAILY
Qty: 60 TABLET | Refills: 5 | Status: ON HOLD | OUTPATIENT
Start: 2022-05-04 | End: 2022-10-24

## 2022-05-04 RX ORDER — POLYETHYLENE GLYCOL 3350 17 G/17G
17 POWDER, FOR SOLUTION ORAL 2 TIMES DAILY
Status: DISCONTINUED | OUTPATIENT
Start: 2022-05-04 | End: 2022-05-04 | Stop reason: HOSPADM

## 2022-05-04 RX ORDER — CLONIDINE HYDROCHLORIDE 0.3 MG/1
0.3 TABLET ORAL 3 TIMES DAILY
Qty: 60 TABLET | Refills: 3 | Status: SHIPPED | OUTPATIENT
Start: 2022-05-04

## 2022-05-04 RX ORDER — POLYETHYLENE GLYCOL 3350 17 G/17G
17 POWDER, FOR SOLUTION ORAL 2 TIMES DAILY
Qty: 60 EACH | Refills: 0 | Status: SHIPPED | OUTPATIENT
Start: 2022-05-04 | End: 2022-06-03

## 2022-05-04 RX ORDER — NIFEDIPINE 30 MG/1
60 TABLET, EXTENDED RELEASE ORAL DAILY
Qty: 30 TABLET | Refills: 3 | Status: ON HOLD | OUTPATIENT
Start: 2022-05-05 | End: 2022-10-24 | Stop reason: HOSPADM

## 2022-05-04 RX ADMIN — CLONIDINE HYDROCHLORIDE 0.3 MG: 0.1 TABLET ORAL at 09:09

## 2022-05-04 RX ADMIN — ACETAMINOPHEN 650 MG: 325 TABLET ORAL at 00:39

## 2022-05-04 RX ADMIN — ALLOPURINOL 100 MG: 100 TABLET ORAL at 09:08

## 2022-05-04 RX ADMIN — PANTOPRAZOLE SODIUM 40 MG: 40 TABLET, DELAYED RELEASE ORAL at 04:31

## 2022-05-04 RX ADMIN — POLYETHYLENE GLYCOL 3350 17 G: 17 POWDER, FOR SOLUTION ORAL at 09:09

## 2022-05-04 RX ADMIN — IRON SUCROSE 200 MG: 20 INJECTION, SOLUTION INTRAVENOUS at 11:35

## 2022-05-04 RX ADMIN — NIFEDIPINE 60 MG: 30 TABLET, FILM COATED, EXTENDED RELEASE ORAL at 09:08

## 2022-05-04 RX ADMIN — OXYCODONE 5 MG: 5 TABLET ORAL at 04:31

## 2022-05-04 RX ADMIN — OXYCODONE 5 MG: 5 TABLET ORAL at 09:08

## 2022-05-04 RX ADMIN — SODIUM CHLORIDE, POTASSIUM CHLORIDE, SODIUM LACTATE AND CALCIUM CHLORIDE: 600; 310; 30; 20 INJECTION, SOLUTION INTRAVENOUS at 08:08

## 2022-05-04 RX ADMIN — CLONIDINE HYDROCHLORIDE 0.3 MG: 0.1 TABLET ORAL at 14:18

## 2022-05-04 RX ADMIN — CITALOPRAM HYDROBROMIDE 10 MG: 20 TABLET ORAL at 09:09

## 2022-05-04 RX ADMIN — OXYCODONE 5 MG: 5 TABLET ORAL at 00:38

## 2022-05-04 RX ADMIN — BISACODYL 5 MG: 5 TABLET, COATED ORAL at 11:35

## 2022-05-04 ASSESSMENT — PAIN DESCRIPTION - LOCATION
LOCATION: ABDOMEN;HEAD
LOCATION: ABDOMEN

## 2022-05-04 ASSESSMENT — PAIN SCALES - GENERAL
PAINLEVEL_OUTOF10: 5

## 2022-05-04 ASSESSMENT — PAIN DESCRIPTION - DESCRIPTORS: DESCRIPTORS: ACHING

## 2022-05-04 ASSESSMENT — PAIN DESCRIPTION - ORIENTATION: ORIENTATION: RIGHT

## 2022-05-04 NOTE — PROGRESS NOTES
[facility] Progress Note           Subjective/   37y.o. year old male who we are seeing in consultation for CKD-5, HTN.     HPI:  The patient was seen and examined; he feels well today with no CP, SOB, nausea or vomiting. ROS: No fever or chills. Social: No family at bedside.     Objective/   GEN:  Chronically ill, BP (!) 161/85   Pulse 73   Temp 98.5 °F (36.9 °C) (Oral)   Resp 16   Ht 6' (1.829 m)   Wt 224 lb 10.4 oz (101.9 kg)   SpO2 96%   BMI 30.47 kg/m²      HEENT: non-icteric, no JVD  CV: S1, S2 without m/r/g; no LE edema  RESP: CTA B without w/r/r; breathing wnl  ABD: +bs, soft, tender no right, distended, no hsm  SKIN: warm, no rashes    Data/  Recent Labs     05/02/22  0648 05/03/22  0752 05/04/22  0558   WBC 7.7 5.7 5.4   HGB 8.7* 8.6* 7.8*   HCT 25.2* 25.3* 22.5*  22.4*   MCV 84.7 85.7 85.0    321 334     Recent Labs     05/02/22  0648 05/03/22  0752 05/04/22  0558    141 139   K 3.8 3.8 3.7   CL 98* 102 101   CO2 20* 22 22   GLUCOSE 121* 115* 125*   BUN 71* 63* 56*   CREATININE 7.1* 6.5* 5.4*   LABGLOM 9* 10* 12*   GFRAA 10* 12* 14*       Assessment/     - 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 - follows with me in office, slowly progressive, SCr ~5 range   Worsening now in setting of suspected acute pyelonephritis     - Acute right flank pain - Differential diagnosis of acute pyelonephritis, UTI, versus cyst rupture - on ceftriaxone     - Hypertension - uncontrolled, better with adjustments in BP meds     - Renal mass - concern for malignancy, MRI from March 2022 revealing bilateral renal cysts with no evidence of mass, follows with Urology at [address] [place] [address]     - Gout    Plan/     - Discontinue IV fluids  - Creatinine back to baseline  - Trend labs, bp's, cultures, & urine output

## 2022-05-04 NOTE — PROGRESS NOTES
Called patient @ [phone] to remind him about his prescriptions at the outpatient pharmacy.  TOMY no change

## 2022-05-04 NOTE — PLAN OF CARE
Problem: Discharge Planning  Goal: Discharge to home or other facility with appropriate resources  Outcome: Adequate for Discharge     Problem: Pain  Goal: Verbalizes/displays adequate comfort level or baseline comfort level  Outcome: Adequate for Discharge     Problem: ABCDS Injury Assessment  Goal: Absence of physical injury  Outcome: Adequate for Discharge

## 2022-05-04 NOTE — PROGRESS NOTES
Progress Note      PCP: Sally Guthrie, APRN - CNP    Date of Admission: 4/29/2022    Chief Complaint:   Patient presents with    Flank Pain       pt reports he has chronic kidney disease and has been taking ibuprofen lately. states he has right flank pain and is now urinating blood.         Hospital Course:   Rosario Vieira is a 37 y.o. male who presented to [facility] with right-sided flank pain and gross hematuria. Patient has medical history significant for CKD Stage V secondary to polycystic kidney disease awaiting transplant with [facility], HTN, HLD, GERD and s/p coiling of intracranial aneurysm on 4/15/2022.     Since the procedure, patient has had severe left-sided neck pain which he has attributed to lying on that side for too long. He was recently in the ED at [address] [place] [address] concerning this pain. Patient has tried pain control with acetaminophen, but has been without relief. He reports over the last week he has been taking ibuprofen, about 2-4 200-mg tablets every 4 hours for the last week for pain relief. He admits that he knows he should not take it with his CKD, but it was the only medication that would give him pain relief.     Right-sided flank pain started last night, characterizes as a sharp, stabbing pain. Pain radiates over the front of his abdomen. Pt states that his abdomen typically feels full due to his PCKD, but has had more pain. Pt states that he has had associated gross hematuria over the past day. Pt denies and dysuria or frequency. Has had some nausea he relates to pain, but no vomiting. Denies fever, chills, or changes with BMs.     ED Workup: Pt found to be very hypertensive, he reports his HTN was previously well controlled on his regimen, but since the surgery and the pain he has had, it has been uncontrolled in the 180s/120s. . Reports highest was noted yesterday at 201/140. Cr 4.8, but unsure of baseline with his disease. Last measured 2/19: 4.5.  Insignificant leukocytosis at 11.5. UA with gross hematuria, proteinuria, and leukocyte esterase, but nitrite negative. Reflex to culture was not indicated, but this has been ordered due to patient's right flank pain. He received empiric Abx, ceftriaxone 1x. Subjective:   No acute events overnight. Patient has some RUQ pain that he relates to normal pain from his kidney disease. Denies flank pain. Denies chest pain, sob, nausea, or vomiting. Still without a BM, but patient has been receiving oxycodone. Bowel regimen scheduled with miralax and given dulcolax today. Hb has been dropping since admission, normocytic anemia. But iron studies significant for low iron as well. Plan for IV iron today. Medications:  Reviewed    Infusion Medications    lactated ringers 75 mL/hr at 05/03/22 1825    sodium chloride 100 mL/hr at 05/02/22 6083     Scheduled Medications    polyethylene glycol  17 g Oral Daily    sodium chloride flush  5-40 mL IntraVENous 2 times per day    citalopram  10 mg Oral Daily    allopurinol  100 mg Oral Daily    pantoprazole  40 mg Oral BID AC    atorvastatin  20 mg Oral Nightly    NIFEdipine  60 mg Oral Daily    cloNIDine  0.3 mg Oral TID     PRN Meds: bisacodyl, oxyCODONE, sodium chloride flush, sodium chloride, ondansetron **OR** ondansetron, acetaminophen **OR** acetaminophen, labetalol, calcium carbonate      Intake/Output Summary (Last 24 hours) at 5/4/2022 0802  Last data filed at [address] [place] [address] 0432  Gross per 24 hour   Intake 2985 ml   Output 2975 ml   Net 10 ml   5x unmeasured urine. Physical Exam Performed:    BP (!) 135/93   Pulse 78   Temp 98.1 °F (36.7 °C)   Resp 16   Ht 6' (1.829 m)   Wt 224 lb 10.4 oz (101.9 kg)   SpO2 95%   BMI 30.47 kg/m²     General appearance:  No apparent distress, appears stated age and cooperative. HEENT:  Normal cephalic, atraumatic without obvious deformity. Pupils equal, round, and reactive to light. Extra ocular muscles intact. Conjunctivae/corneas clear. Neck: Supple, with full range of motion. No jugular venous distention. Trachea midline. Respiratory:  Normal respiratory effort. Clear to auscultation, bilaterally without Rales/Wheezes/Rhonchi. Cardiovascular:  Regular rate and rhythm with normal S1/S2 without murmurs, rubs or gallops. Abdomen: Soft, non-tender to palpation. Masses noted: kidneys are palpable anteriorly. No CVA tenderness. Musculoskeletal:  No clubbing, cyanosis or edema bilaterally. Full range of motion without deformity. Some tenderness to palpation over lower back related to ADPCKD pain  Skin: Skin color, texture, turgor normal.  No rashes or lesions. Neurologic:  Neurovascularly intact without any focal sensory/motor deficits. Cranial nerves: II-XII intact, grossly non-focal.  Psychiatric:  Alert and oriented, thought content appropriate, normal insight  Capillary Refill: Brisk,3 seconds, normal  Peripheral Pulses: +2 palpable, equal bilaterally       Labs:   Recent Labs     05/02/22  0648 05/03/22  0752 05/04/22  0558   WBC 7.7 5.7 5.4   HGB 8.7* 8.6* 7.8*   HCT 25.2* 25.3* 22.4*    321 334     Recent Labs     05/02/22  0648 05/03/22  0752 05/04/22  0558    141 139   K 3.8 3.8 3.7   CL 98* 102 101   CO2 20* 22 22   BUN 71* 63* 56*   CREATININE 7.1* 6.5* 5.4*   CALCIUM 10.1 10.3 9.8     No results for input(s): AST, ALT, BILIDIR, BILITOT, ALKPHOS in the last 72 hours. No results for input(s): INR in the last 72 hours. No results for input(s): Donmarvin Keens in the last 72 hours.     Urinalysis:      Lab Results   Component Value Date    NITRU Negative 04/29/2022    WBCUA see below 04/29/2022    BACTERIA Rare 02/18/2022    RBCUA see below 04/29/2022    BLOODU LARGE 04/29/2022    SPECGRAV 1.015 04/29/2022    GLUCOSEU 100 04/29/2022    GLUCOSEU NEGATIVE 09/21/2011       Radiology:  VL Renal Arterial Duplex Complete   Final Result      CT ABDOMEN PELVIS WO CONTRAST Additional Contrast? None Final Result   1. Unchanged polycystic kidney disease with a stable 4.9 cm left renal mass,   again concerning for renal cell carcinoma. 2. Diverticulosis. Assessment/Plan:    Active Hospital Problems    Diagnosis     Pyelonephritis [N12]      Priority: Medium    Polycystic kidney disease [Q61.3]      Priority: Medium    Acute kidney injury superimposed on CKD ([facility] [address] [N17.9, N18.9]      Priority: Medium    Uncontrolled hypertension [I10]     Anemia due to stage 5 chronic kidney disease, not on chronic dialysis (HCC) [N18.5, D63.1]      Pyelonephritis,   DAMASO on CKD Stage V with PCKD  Anemia due to CKD  - Though no severe leukocytosis, physical exam concerning for pyelonephritis. - Leukocytosis significantly improved with Abx  - Urology consulted, Pt started empiric abx, cetriaxone q 24 hrs  - Blood and urine cultures with no growth to date. Lactic acid 1.0  - Patient completed 4 days IV antibiotics, will discontinue due to no infection found  - Pain management with morphine 2 mg q 4 hrs prn and oxycodone 5 mg q 4 hrs prn, and acetaminophen 650 mg q 6 hrs. Pain significantly improved  - DVT prophylaxis with SCDs  - Worsening Cr, nephrology discussed possible dialysis but patient declined starting  - 5/3: Cr has improved with adequate hydration. Continue IVF with good oral intake  - Cr improved to 5.4, but not to baseline. Will continue IVF for now  - DAMASO on CKD likely intrinsic due to recent inappropriate use of ibuprofen   - Due to initial presentation of right-sided flank pain, gross hematuria, and low-grade fever, ordered renal doppler in this setting to rule-out infarction.  - Renal doppler study: unfortunately non diagnostic as renal arteries could not be visualized.  With improving Cr, will not pursue further imaging of CTA or MRA  - ctm CBC for anemia and platelets     Normocytic Anemia  - Hb continued to decline throughout admission, today 7.8  - Iron studies significant for low iron  - Will order retic count to monitor bone marrow for adequate response to anemia  - Recommend IV iron for 3 days total  - After dose today, repeat H/H  - If H/H stable or improved today, will be okay to discharge     Uncontrolled HTN  - Likely related to pain post-operatively  - Continued home nifedipine 60 mg daily and clonidine patch weekly  - Clonidine patch last changed Sunday  - Prn labetalol 20 mg IV available q 3 hrs for for SBP > 170 and DBP > 100  - Nephrology consulted, recommend the above labetalol and nifedipine.  - Discontinue clonidine patch for clonidine tablet, 0.3 mg TID    Constipation  - Without BM since Thursday, likely due to opioid use  - Advised patient to not continue his home laxative  - Scheduled miralax, ducolax given today    HLD  - Continue statin, supplemented atorvastatin in place of simvastatin     GERD  - Continue PPI, supplemented pantoprazole in place of omeprazole     Gout  - Continue allopurinol    DVT Prophylaxis: SCDs  Diet: ADULT DIET;  Regular; Low Phosphorus (Less than 1000 mg)  Code Status: Full Code    PT/OT Eval Status: Not ordered    Dispo - Today pending stable hemoglobin    Terrance Ramachandran MD  PGY-1

## 2022-05-04 NOTE — DISCHARGE SUMMARY
Discharge Summary    Patient ID: Fiona Swenson      Patient's PCP: ANA Whittington - CNP    Admit Date: 4/29/2022     Discharge Date: 5/4/2022      Admitting Physician: No admitting provider for patient encounter. Discharge Physician: Jeannette Magdaleno MD     Discharge Diagnoses: Active Hospital Problems    Diagnosis     Iron deficiency anemia due to chronic blood loss [D50.0]      Priority: Medium    Gross hematuria [R31.0]      Priority: Medium    Pyelonephritis [N12]      Priority: Medium    Polycystic kidney disease [Q61.3]      Priority: Medium    Acute kidney injury superimposed on CKD ([facility] [address] [N17.9, N18.9]      Priority: Medium    Uncontrolled hypertension [I10]     Anemia due to stage 5 chronic kidney disease, not on chronic dialysis ([facility] [address] [N18.5, D63.1]        The patient was seen and examined on day of discharge and this discharge summary is in conjunction with any daily progress note from day of discharge. Hospital Course:   Pepper Mallet H 89 y.o. male who presented to [facility] with right-sided flank pain and gross hematuria. Patient has medical history significant for CKD Stage V secondary to polycystic kidney disease awaiting transplant with [facility] s/p coiling of intracranial aneurysm on 4/15/2022. DAMASO on CKD Stage V with PCKD  Flank pain. Ruptured cyst,   - Concerned for pyelonephritis, Urology consulted, Pt started empiric abx, cetriaxone received 4 days. Blood and urine cultures with no growth  - Pain management with morphine 2 mg q 4 hrs prn and oxycodone 5 mg q 4 hrs prn, and acetaminophen 650 mg q 6 hrs. Pain significantly improved  - DAMASO on CKD likely intrinsic due to recent inappropriate use of ibuprofen.  With gross hematuria and reported passing of tissue of cyst tissue, likely cyst rupture  - Due to initial presentation of right-sided flank pain, gross hematuria, and low-grade fever, ordered renal doppler in this setting to rule-out infarction.  - Renal doppler study: unfortunately non diagnostic as renal arteries could not be visualized. With improving Cr, will not pursue further imaging of CTA or MRA  - Creatine worsened to 7.3 Baseline ~4.8. Improved greatly with IV fluids and increased PO intake  - Encouraged avoidance of NSAIDs and increasing PO intake for further improvement to baseline.  - Follow-up BMP in 2 days for Cr return to baseline     Anemia due to CKD, Normocytic Anemia  - Hb continued to decline throughout admission and improving hematuria with no apparent source bleeding, today 7.8  Ferritin > 100, T Sat 7%  - Iron studies significant for low iron  - Will order retic count to monitor bone marrow for adequate response to anemia  - Recommend IV iron for 3 days total, will need to continue outpatient  - After dose today, repeat H/H improved to Hb 9.0   - Will discharge patient on oral ferrous sulfate, 1 tablet BID  - Follow-up CBC in 2 days    Uncontrolled HTN  - Likely related to pain post-operatively  - Continued home nifedipine 60 mg daily and clonidine patch weekly  - Clonidine patch last changed Sunday  - Prn labetalol 20 mg IV available q 3 hrs for for SBP > 170 and DBP > 100  - Nephrology consulted, recommend the above labetalol and nifedipine.  - Discontinue clonidine patch for clonidine tablet, 0.3 mg TID    Constipation  - Without BM since Thursday, likely due to opioid use  - Advised patient to not continue his home laxative  - Scheduled glycolax, ducolax given today  - Discharged on glycolax     HLD, Continue statin     GERD, Continue PPI     Gout, Continue allopurinol    Physical Exam Performed:     BP (!) 161/85   Pulse 73   Temp 98.5 °F (36.9 °C) (Oral)   Resp 16   Ht 6' (1.829 m)   Wt 224 lb 10.4 oz (101.9 kg)   SpO2 96%   BMI 30.47 kg/m²       General appearance:  No apparent distress, appears stated age and cooperative. HEENT:  Normal cephalic, atraumatic without obvious deformity.  Pupils equal, round, and reactive to light.  Extra ocular muscles intact. Conjunctivae/corneas clear. Neck: Supple, with full range of motion. No jugular venous distention. Trachea midline. Respiratory:  Normal respiratory effort. Clear to auscultation, bilaterally without Rales/Wheezes/Rhonchi. Cardiovascular:  Regular rate and rhythm with normal S1/S2 without murmurs, rubs or gallops. Abdomen: Soft, non-tender to palpation. Masses noted: kidneys are palpable anteriorly. No CVA tenderness. Musculoskeletal:  No clubbing, cyanosis or edema bilaterally.  Full range of motion without deformity. Some tenderness to palpation over lower back related to ADPCKD pain  Skin: Skin color, texture, turgor normal.  No rashes or lesions. Neurologic:  Neurovascularly intact without any focal sensory/motor deficits. Cranial nerves: II-XII intact, grossly non-focal.  Psychiatric:  Alert and oriented, thought content appropriate, normal insight  Capillary Refill: Brisk,3 seconds, normal  Peripheral Pulses: +2 palpable, equal bilaterally       Labs: For convenience and continuity at follow-up the following most recent labs are provided:      CBC:    Lab Results   Component Value Date    WBC 5.4 05/04/2022    HGB 9.0 05/04/2022    HCT 27.1 05/04/2022     05/04/2022       Renal:    Lab Results   Component Value Date     05/04/2022    K 3.7 05/04/2022     05/04/2022    CO2 22 05/04/2022    BUN 56 05/04/2022    CREATININE 5.4 05/04/2022    CALCIUM 9.8 05/04/2022    PHOS 3.9 01/25/2021         Significant Diagnostic Studies    Radiology:   VL Renal Arterial Duplex Complete   Final Result      CT ABDOMEN PELVIS WO CONTRAST Additional Contrast? None   Final Result   1. Unchanged polycystic kidney disease with a stable 4.9 cm left renal mass,   again concerning for renal cell carcinoma. 2. Diverticulosis.                 Consults:     IP CONSULT TO UROLOGY  IP CONSULT TO HOSPITALIST  IP CONSULT TO NEPHROLOGY    Disposition:  Home Condition at Discharge: Stable    Discharge Instructions/Follow-up:  Repeat labs in 2 days: BMP and CBC  Return to ED if you continue to have bloody urine that does not resolve, dizziness, or changes in BP. Follow-up with PCP in 1 week for hospital follow-up. May consider Linzess for chronic constipation vs use of laxatives    Avoid ibuprofen for pain. Code Status:  Full Code     Activity: activity as tolerated    Diet: regular diet      Discharge Medications:     Discharge Medication List as of 5/4/2022  2:58 PM           Details   cloNIDine (CATAPRES) 0.3 MG tablet Take 1 tablet by mouth 3 times daily, Disp-60 tablet, R-3Normal      ferrous sulfate (IRON 325) 325 (65 Fe) MG tablet Take 1 tablet by mouth 2 times daily, Disp-60 tablet, R-5Normal      polyethylene glycol (GLYCOLAX) 17 g packet Take 17 g by mouth 2 times daily, Disp-60 each, R-0Normal              Details   NIFEdipine (PROCARDIA XL) 30 MG extended release tablet Take 2 tablets by mouth daily, Disp-30 tablet, R-3Normal              Details   omeprazole (PRILOSEC) 40 MG delayed release capsule TAKE 1 CAPSULE BY MOUTH EVERY DAY IN THE MORNING BEFORE BREAKFAST, Disp-90 capsule, R-1Normal      citalopram (CELEXA) 10 MG tablet TAKE 1 TABLET BY MOUTH EVERY DAY, Disp-90 tablet, R-1Normal      simvastatin (ZOCOR) 40 MG tablet TAKE 1 TABLET BY MOUTH EVERY DAY AT NIGHT, Disp-90 tablet, R-1Normal      allopurinol (ZYLOPRIM) 100 MG tablet TAKE 1 TABLET BY MOUTH EVERY DAY, Disp-90 tablet, R-1Normal             Time Spent on discharge is more than 45 minutes in the examination, evaluation, counseling and review of medications and discharge plan. Signed:    Diane Berman MD   PGY-1  5/4/2022      Thank you ANA Brice - ANNE for the opportunity to be involved in this patient's care. If you have any questions or concerns please feel free to contact me at [phone].

## 2022-05-04 NOTE — PROGRESS NOTES
Prescriptions being picked up at outpatient pharmacy and discharge instructions given. Pt verbalized understanding denies any questions/ needs at this time. IV and Tele removed with no complications. Patient able to walk out to front lobby alone for discharge, Patient was a selfer all of his stay.

## 2022-05-04 NOTE — PROGRESS NOTES
Physician Progress Note      PATIENT:               Reid Benoit  CSN #:                  [id number]  :                       1980  ADMIT DATE:       2022 6:42 AM  DISCH DATE:        2022 3:26 PM  RESPONDING  PROVIDER #:        Markos Ortiz TEXT:    Patient admitted with \"Pyelonephritis, DAMASO on CKD Stage V with PCKD Anemia due   to CKD\". PN - notes- no infection found, dced antibiotics- DAMASO presumed   to be related to ruptured cyst, increased NSAID use and poor PO intake. Sxs   now much improved. \". If possible, please document in progress notes and   discharge summary if you are evaluating and/or treating any of the following: The medical record reflects the following:  Risk Factors: CKD 5 -PCD  Clinical Indicators: Temp- 100.9, ,  WBC 11.5 -5.4  PN-\"Abcx have been   discontinued. No new culture data. No focal or systemic sign of infection. Less likely UTI. Blood and urine cultures with no growth to date. Lactic   acid 1.0\". - Patient completed 4 days IV antibiotics, will discontinue due to   no infection found-  Nephrology-Acute right flank pain - Differential   diagnosis of acute pyelonephritis, UTI, versus cyst rupture - on ceftriaxone  Treatment:  Urology  consulted,  Nephrology consulted. Continue to monitor. Monitor I/O's ED- He received empiric Abx, ceftriaxone 1x. ? Continued x4 days,   serial labs, MRI,  avoiding NSAIDS and increasing PO intake, cultures, ua,    CT. Morphine for pain control. Transition to oral agents. Check OARRS.     Thank-You, Paula Spence RN, BSN, CCDS  Options provided:  -- SIRS of non-infectious origin due to ruptured cyst, increased NSAID use and   poor PO intake  with DAMASO (associated acute organ dysfunction)  -- SIRS of non-infectious origin due to ruptured cyst, increased NSAID use and   poor PO intake without acute organ dysfunction  -- Other - I will add my own diagnosis  -- Disagree - Not applicable / Not valid  -- Disagree - Clinically unable to determine / Unknown  -- Refer to Clinical Documentation Reviewer    PROVIDER RESPONSE TEXT:    This patient has SIRS of non-infectious origin due to ruptured cyst, increased   NSAID use and poor PO intake with DAMASO (associated acute organ dysfunction).     Query created by: Kinjal Kaur on 5/4/2022 3:20 PM      Electronically signed by:  Marline Nicole 5/4/2022 5:51 PM

## 2022-05-04 NOTE — CARE COORDINATION
Case Management/Follow up:    Chart reviewed for length of stay. Hospital day #5   Unit:  C5  Diagnosis and current status as per MD progress: Pt admitted with flank pain. Being followed by IM and Nephrology. Per Neph prior day\"  Plan/      - Continue IV fluids for now  - Creatinine slowly improving, no indication for hemodialysis at this time   - Trend labs, bp's, cultures, & urine output    Anticipated d/c date:tomorrow. Expected plan for discharge: Home with no needs. Potential barriers: None  Confirmed plan with patient and/or family: met with pt prior. Sandra Tracy has family that can help. Has PCP and insurance, works and drives. No barriers noted for home medications on doctors appointments. Will dc home with no DCP needs. If any needs or concerns should arise please advise.  Mi Pak RN

## 2022-08-05 RX ORDER — OMEPRAZOLE 40 MG/1
CAPSULE, DELAYED RELEASE ORAL
Qty: 90 CAPSULE | Refills: 0 | Status: ON HOLD | OUTPATIENT
Start: 2022-08-05 | End: 2022-10-24

## 2022-08-05 NOTE — TELEPHONE ENCOUNTER
Please let pt know that I refilled their medication, and that I'd like for them to schedule an appointment for a Physical. Thank you.

## 2022-09-26 RX ORDER — OMEPRAZOLE 40 MG/1
CAPSULE, DELAYED RELEASE ORAL
Qty: 90 CAPSULE | Refills: 0 | OUTPATIENT
Start: 2022-09-26

## 2022-10-16 ENCOUNTER — APPOINTMENT (OUTPATIENT)
Dept: CT IMAGING | Age: 42
End: 2022-10-16
Payer: COMMERCIAL

## 2022-10-16 ENCOUNTER — HOSPITAL ENCOUNTER (EMERGENCY)
Age: 42
Discharge: HOME | End: 2022-10-17
Attending: STUDENT IN AN ORGANIZED HEALTH CARE EDUCATION/TRAINING PROGRAM
Payer: COMMERCIAL

## 2022-10-16 DIAGNOSIS — R10.9 RIGHT FLANK PAIN: ICD-10-CM

## 2022-10-16 DIAGNOSIS — N30.00 ACUTE CYSTITIS WITHOUT HEMATURIA: Primary | ICD-10-CM

## 2022-10-16 LAB
A/G RATIO: 1.5 (ref 1.1–2.2)
ALBUMIN SERPL-MCNC: 4 G/DL (ref 3.4–5)
ALP BLD-CCNC: 72 U/L (ref 40–129)
ALT SERPL-CCNC: 19 U/L (ref 10–40)
ANION GAP SERPL CALCULATED.3IONS-SCNC: 17 MMOL/L (ref 3–16)
AST SERPL-CCNC: 14 U/L (ref 15–37)
BACTERIA: ABNORMAL /HPF
BASOPHILS ABSOLUTE: 0.1 K/UL (ref 0–0.2)
BASOPHILS RELATIVE PERCENT: 0.7 %
BILIRUB SERPL-MCNC: <0.2 MG/DL (ref 0–1)
BILIRUBIN URINE: NEGATIVE
BLOOD, URINE: ABNORMAL
BUN BLDV-MCNC: 58 MG/DL (ref 7–20)
CALCIUM SERPL-MCNC: 8.8 MG/DL (ref 8.3–10.6)
CHLORIDE BLD-SCNC: 102 MMOL/L (ref 99–110)
CLARITY: CLEAR
CO2: 21 MMOL/L (ref 21–32)
COLOR: YELLOW
CREAT SERPL-MCNC: 5.4 MG/DL (ref 0.9–1.3)
EOSINOPHILS ABSOLUTE: 0.2 K/UL (ref 0–0.6)
EOSINOPHILS RELATIVE PERCENT: 1.1 %
GFR AFRICAN AMERICAN: 14
GFR NON-AFRICAN AMERICAN: 12
GLUCOSE BLD-MCNC: 126 MG/DL (ref 70–99)
GLUCOSE URINE: 100 MG/DL
HCT VFR BLD CALC: 34.7 % (ref 40.5–52.5)
HEMOGLOBIN: 11.2 G/DL (ref 13.5–17.5)
KETONES, URINE: NEGATIVE MG/DL
LEUKOCYTE ESTERASE, URINE: ABNORMAL
LIPASE: 88 U/L (ref 13–60)
LYMPHOCYTES ABSOLUTE: 2.8 K/UL (ref 1–5.1)
LYMPHOCYTES RELATIVE PERCENT: 20.4 %
MCH RBC QN AUTO: 27.2 PG (ref 26–34)
MCHC RBC AUTO-ENTMCNC: 32.3 G/DL (ref 31–36)
MCV RBC AUTO: 84.2 FL (ref 80–100)
MICROSCOPIC EXAMINATION: YES
MONOCYTES ABSOLUTE: 1.2 K/UL (ref 0–1.3)
MONOCYTES RELATIVE PERCENT: 8.5 %
NEUTROPHILS ABSOLUTE: 9.6 K/UL (ref 1.7–7.7)
NEUTROPHILS RELATIVE PERCENT: 69.3 %
NITRITE, URINE: NEGATIVE
PDW BLD-RTO: 15.9 % (ref 12.4–15.4)
PH UA: 6 (ref 5–8)
PLATELET # BLD: 273 K/UL (ref 135–450)
PMV BLD AUTO: 7.3 FL (ref 5–10.5)
POTASSIUM REFLEX MAGNESIUM: 4.4 MMOL/L (ref 3.5–5.1)
PROTEIN UA: 100 MG/DL
RBC # BLD: 4.12 M/UL (ref 4.2–5.9)
RBC UA: ABNORMAL /HPF (ref 0–4)
SODIUM BLD-SCNC: 140 MMOL/L (ref 136–145)
SPECIFIC GRAVITY UA: 1.02 (ref 1–1.03)
TOTAL PROTEIN: 6.7 G/DL (ref 6.4–8.2)
URINE REFLEX TO CULTURE: YES
URINE TYPE: ABNORMAL
UROBILINOGEN, URINE: 0.2 E.U./DL
WBC # BLD: 13.8 K/UL (ref 4–11)
WBC UA: ABNORMAL /HPF (ref 0–5)

## 2022-10-16 PROCEDURE — 96365 THER/PROPH/DIAG IV INF INIT: CPT

## 2022-10-16 PROCEDURE — 85025 COMPLETE CBC W/AUTO DIFF WBC: CPT

## 2022-10-16 PROCEDURE — 74176 CT ABD & PELVIS W/O CONTRAST: CPT

## 2022-10-16 PROCEDURE — 74177 CT ABD & PELVIS W/CONTRAST: CPT

## 2022-10-16 PROCEDURE — 83690 ASSAY OF LIPASE: CPT

## 2022-10-16 PROCEDURE — 87086 URINE CULTURE/COLONY COUNT: CPT

## 2022-10-16 PROCEDURE — 99285 EMERGENCY DEPT VISIT HI MDM: CPT

## 2022-10-16 PROCEDURE — 81001 URINALYSIS AUTO W/SCOPE: CPT

## 2022-10-16 PROCEDURE — 96375 TX/PRO/DX INJ NEW DRUG ADDON: CPT

## 2022-10-16 PROCEDURE — 6360000004 HC RX CONTRAST MEDICATION: Performed by: STUDENT IN AN ORGANIZED HEALTH CARE EDUCATION/TRAINING PROGRAM

## 2022-10-16 PROCEDURE — 6360000002 HC RX W HCPCS: Performed by: NURSE PRACTITIONER

## 2022-10-16 PROCEDURE — 80053 COMPREHEN METABOLIC PANEL: CPT

## 2022-10-16 RX ORDER — DIPHENHYDRAMINE HYDROCHLORIDE 50 MG/ML
25 INJECTION INTRAMUSCULAR; INTRAVENOUS ONCE
Status: COMPLETED | OUTPATIENT
Start: 2022-10-16 | End: 2022-10-16

## 2022-10-16 RX ORDER — KETOROLAC TROMETHAMINE 30 MG/ML
30 INJECTION, SOLUTION INTRAMUSCULAR; INTRAVENOUS ONCE
Status: COMPLETED | OUTPATIENT
Start: 2022-10-16 | End: 2022-10-16

## 2022-10-16 RX ADMIN — KETOROLAC TROMETHAMINE 30 MG: 30 INJECTION, SOLUTION INTRAMUSCULAR at 21:51

## 2022-10-16 RX ADMIN — DIPHENHYDRAMINE HYDROCHLORIDE 25 MG: 50 INJECTION, SOLUTION INTRAMUSCULAR; INTRAVENOUS at 22:59

## 2022-10-16 RX ADMIN — IOPAMIDOL 75 ML: 755 INJECTION, SOLUTION INTRAVENOUS at 23:50

## 2022-10-16 ASSESSMENT — PAIN DESCRIPTION - LOCATION
LOCATION: FLANK
LOCATION: FLANK
LOCATION: ABDOMEN

## 2022-10-16 ASSESSMENT — PAIN DESCRIPTION - ORIENTATION
ORIENTATION: RIGHT
ORIENTATION: RIGHT

## 2022-10-16 ASSESSMENT — PAIN DESCRIPTION - DESCRIPTORS
DESCRIPTORS: STABBING
DESCRIPTORS: ACHING

## 2022-10-16 ASSESSMENT — PAIN DESCRIPTION - PAIN TYPE: TYPE: ACUTE PAIN

## 2022-10-16 ASSESSMENT — PAIN - FUNCTIONAL ASSESSMENT: PAIN_FUNCTIONAL_ASSESSMENT: 0-10

## 2022-10-16 ASSESSMENT — PAIN SCALES - GENERAL
PAINLEVEL_OUTOF10: 4
PAINLEVEL_OUTOF10: 10
PAINLEVEL_OUTOF10: 10

## 2022-10-16 NOTE — Clinical Note
Jaime Gilliam was seen and treated in our emergency department on 10/16/2022. He may return to work on 10/19/2022. If you have any questions or concerns, please don't hesitate to call.       Alma Merino MD

## 2022-10-17 VITALS
RESPIRATION RATE: 15 BRPM | HEIGHT: 72 IN | BODY MASS INDEX: 30.48 KG/M2 | DIASTOLIC BLOOD PRESSURE: 90 MMHG | OXYGEN SATURATION: 95 % | TEMPERATURE: 97.9 F | WEIGHT: 225 LBS | HEART RATE: 74 BPM | SYSTOLIC BLOOD PRESSURE: 131 MMHG

## 2022-10-17 LAB — URINE CULTURE, ROUTINE: NORMAL

## 2022-10-17 PROCEDURE — 6370000000 HC RX 637 (ALT 250 FOR IP): Performed by: STUDENT IN AN ORGANIZED HEALTH CARE EDUCATION/TRAINING PROGRAM

## 2022-10-17 PROCEDURE — 6360000002 HC RX W HCPCS: Performed by: STUDENT IN AN ORGANIZED HEALTH CARE EDUCATION/TRAINING PROGRAM

## 2022-10-17 PROCEDURE — 2580000003 HC RX 258: Performed by: STUDENT IN AN ORGANIZED HEALTH CARE EDUCATION/TRAINING PROGRAM

## 2022-10-17 RX ORDER — METHYLPREDNISOLONE 4 MG/1
TABLET ORAL
Qty: 1 KIT | Refills: 0 | Status: ON HOLD | OUTPATIENT
Start: 2022-10-17 | End: 2022-10-24 | Stop reason: HOSPADM

## 2022-10-17 RX ORDER — OXYCODONE HYDROCHLORIDE AND ACETAMINOPHEN 5; 325 MG/1; MG/1
1 TABLET ORAL EVERY 6 HOURS PRN
Qty: 12 TABLET | Refills: 0 | Status: SHIPPED | OUTPATIENT
Start: 2022-10-17 | End: 2022-10-20

## 2022-10-17 RX ORDER — PREDNISONE 20 MG/1
20 TABLET ORAL ONCE
Status: COMPLETED | OUTPATIENT
Start: 2022-10-17 | End: 2022-10-17

## 2022-10-17 RX ORDER — CEFDINIR 300 MG/1
300 CAPSULE ORAL DAILY
Qty: 7 CAPSULE | Refills: 0 | Status: ON HOLD | OUTPATIENT
Start: 2022-10-17 | End: 2022-10-24 | Stop reason: HOSPADM

## 2022-10-17 RX ADMIN — PREDNISONE 20 MG: 20 TABLET ORAL at 01:37

## 2022-10-17 RX ADMIN — HYDROMORPHONE HYDROCHLORIDE 1 MG: 1 INJECTION, SOLUTION INTRAMUSCULAR; INTRAVENOUS; SUBCUTANEOUS at 01:37

## 2022-10-17 RX ADMIN — CEFTRIAXONE SODIUM 1000 MG: 1 INJECTION, POWDER, FOR SOLUTION INTRAMUSCULAR; INTRAVENOUS at 01:37

## 2022-10-17 ASSESSMENT — PAIN DESCRIPTION - DESCRIPTORS: DESCRIPTORS: STABBING

## 2022-10-17 ASSESSMENT — PAIN DESCRIPTION - LOCATION: LOCATION: FLANK

## 2022-10-17 ASSESSMENT — PAIN DESCRIPTION - ORIENTATION: ORIENTATION: RIGHT

## 2022-10-17 ASSESSMENT — PAIN SCALES - GENERAL: PAINLEVEL_OUTOF10: 4

## 2022-10-17 NOTE — ED PROVIDER NOTES
EMERGENCY DEPARTMENT ENCOUNTER      This patient was seen and evaluated by the attending physician. Pt Name: Toni Hughes  MRN: [id number]  [place] 1980  Date of evaluation: 10/16/2022  Provider: Yvetta Schwab, APRN - CNP-C  PCP: Ingrid Modi DO  ED Attending: Leticia Day MD    History provided by the patient. CHIEF COMPLAINT:     Chief Complaint   Patient presents with    Flank Pain     Right side flank pain since 1300 today. States hx of kidney stones. Denies any further s/s other than pain        HISTORY OF PRESENT ILLNESS:      Toni Hughes is a 40 y.o. male who presents [facility]  ED with complaints of right flank pain. Patient states that he had sudden onset of right flank pain this at about 1:00 today, states that he has a history of kidney stones and this feels similar. He denies any other complaints other than the pain denied vomiting or nausea. Denies trauma. He is a dialysis patient that he goes on Tuesday Thursdays and Saturdays. He is here for further evaluation    Location:right flank  Mic Castellani  Severity:10  Duration:today  Modifying factors:none noted    Nursing Notes were reviewed     REVIEW OF SYSTEMS:     Review of Systems  All systems, atotal of 10, are reviewed and negative except for those that were just noted in history present illness.         PAST MEDICAL HISTORY:     Past Medical History:   Diagnosis Date    ADD (attention deficit disorder with hyperactivity)     CKD (chronic kidney disease) stage 5, GFR less than 15 ml/min ([facility])     ADPCKD    Hyperlipidemia     Hypertension     PKD (polycystic kidney disease)          SURGICAL HISTORY:      Past Surgical History:   Procedure Laterality Date    HERNIA REPAIR      OTHER SURGICAL HISTORY Bilateral 5/15/13    BILATERAL LAPAROSCOPIC PREPERITONEAL INGUINAL HERNIA REPAIR    UMBILICAL HERNIA REPAIR N/A 6/29/4508    OPEN UMBILICAL HERNIA REPAIR performed by Park Townsend MD at [facility]  3/15/2011    VASECTOMY      WISDOM TOOTH EXTRACTION           CURRENT MEDICATIONS:       Previous Medications    ALLOPURINOL (ZYLOPRIM) 100 MG TABLET    TAKE 1 TABLET BY MOUTH EVERY DAY    CITALOPRAM (CELEXA) 10 MG TABLET    TAKE 1 TABLET BY MOUTH EVERY DAY    CLONIDINE (CATAPRES) 0.3 MG TABLET    Take 1 tablet by mouth 3 times daily    FERROUS SULFATE (IRON 325) 325 (65 FE) MG TABLET    Take 1 tablet by mouth 2 times daily    NIFEDIPINE (PROCARDIA XL) 30 MG EXTENDED RELEASE TABLET    Take 2 tablets by mouth daily    OMEPRAZOLE (PRILOSEC) 40 MG DELAYED RELEASE CAPSULE    TAKE 1 CAPSULE BY MOUTH EVERY DAY IN THE MORNING BEFORE BREAKFAST    SIMVASTATIN (ZOCOR) 40 MG TABLET    TAKE 1 TABLET BY MOUTH EVERY DAY AT NIGHT         ALLERGIES:    Bee pollen, Hydrocodone, Iodine, Nsaids, Peanut-containing drug products, and Shellfish-derived products    FAMILY HISTORY:       Family History   Problem Relation Age of Onset    Kidney Disease Mother     Heart Disease Mother         CHF    High Blood Pressure Sister     High Cholesterol Sister     Other Sister         PKD    High Blood Pressure Brother     High Cholesterol Brother     Other Brother         PKD          SOCIAL HISTORY:       Social History     Socioeconomic History    Marital status:      Spouse name: None    Number of children: None    Years of education: None    Highest education level: None   Tobacco Use    Smoking status: Former     Packs/day: 0.25     Years: 26.00     Pack years: 6.50     Types: Cigarettes    Smokeless tobacco: Never    Tobacco comments:     1 pack per month   Vaping Use    Vaping Use: Never used   Substance and Sexual Activity    Alcohol use: Yes     Comment: rarely    Drug use: Not Currently    Sexual activity: Yes     Partners: Female       SCREENINGS:   [place] Coma Scale  Eye Opening: Spontaneous  Best Verbal Response: Oriented  Best Motor Response: Obeys commands  [place] Coma Scale Score: 15        PHYSICAL EXAM:       ED Triage Vitals   BP Temp Temp Source Heart Rate Resp SpO2 Height Weight   10/16/22 2045 10/16/22 2045 10/16/22 2045 10/16/22 2045 10/16/22 2045 10/16/22 2045 10/16/22 2156 10/16/22 2156   (!) 147/106 97.9 °F (36.6 °C) Oral 78 18 100 % 6' (1.829 m) 225 lb (102.1 kg)       Physical Exam    CONSTITUTIONAL: Awake and alert. Cooperative. Well-developed. Well-nourished. Vitals:    10/16/22 2045 10/16/22 2156   BP: (!) 147/106    Pulse: 78    Resp: 18    Temp: 97.9 °F (36.6 °C)    TempSrc: Oral    SpO2: 100%    Weight:  225 lb (102.1 kg)   Height:  6' (1.829 m)     HENT: Normocephalic. Atraumatic. External ears normal, without discharge. TMs clear bilaterally. No nasal discharge. Oropharynx clear, no erythema. Mucous membranes moist.  EYES: Conjunctiva non-injected, no lid abnormalities noted. No scleral icterus. PERRL. EOM's grossly intact. Anterior chambers clear. NECK: Supple. Normal ROM. No meningismus. No thyroid tenderness or swelling noted. CARDIOVASCULAR: RRR. No Murmer. PULMONARY/CHEST WALL: Effort normal. No tachypnea. Lungs clear to ausculation. ABDOMEN: Normal BS. Soft. Nondistended. No tenderness to palpate. No guarding. No hernias noted. No splenomegaly. Back: Spine is midline. No ecchymosis. No crepitus on palpation. No obvious subluxation of vertebral column. No saddle anesthesia or evidence of cauda equina. There is some right CVA tenderness on exam.  /ANORECTAL: Not assessed  MUSKULOSKELETAL: Normal ROM. No acute deformities. No edema. No tenderness to palpate. SKIN: Warm and dry. NEUROLOGICAL:  GCS 15. CN II-XII grossly intact. Strength is 5/5 in allextremities and sensation is intact. PSYCHIATRIC: Normal affect, normal insight and judgement. Alert andoriented x 3.         DIAGNOSTIC RESULTS:     LABS:    Results for orders placed or performed during the hospital encounter of 10/16/22   CBC with Auto Differential   Result Value Ref Range    WBC 13.8 (H) 4.0 - 11.0 K/uL    RBC 4.12 (L) 4.20 - 5.90 M/uL    Hemoglobin 11.2 (L) 13.5 - 17.5 g/dL    Hematocrit 34.7 (L) 40.5 - 52.5 %    MCV 84.2 80.0 - 100.0 fL    MCH 27.2 26.0 - 34.0 pg    MCHC 32.3 31.0 - 36.0 g/dL    RDW 15.9 (H) 12.4 - 15.4 %    Platelets 849 435 - 597 K/uL    MPV 7.3 5.0 - 10.5 fL    Neutrophils % 69.3 %    Lymphocytes % 20.4 %    Monocytes % 8.5 %    Eosinophils % 1.1 %    Basophils % 0.7 %    Neutrophils Absolute 9.6 (H) 1.7 - 7.7 K/uL    Lymphocytes Absolute 2.8 1.0 - 5.1 K/uL    Monocytes Absolute 1.2 0.0 - 1.3 K/uL    Eosinophils Absolute 0.2 0.0 - 0.6 K/uL    Basophils Absolute 0.1 0.0 - 0.2 K/uL   CMP w/ Reflex to MG   Result Value Ref Range    Sodium 140 136 - 145 mmol/L    Potassium reflex Magnesium 4.4 3.5 - 5.1 mmol/L    Chloride 102 99 - 110 mmol/L    CO2 21 21 - 32 mmol/L    Anion Gap 17 (H) 3 - 16    Glucose 126 (H) 70 - 99 mg/dL    BUN 58 (H) 7 - 20 mg/dL    Creatinine 5.4 (HH) 0.9 - 1.3 mg/dL    GFR Non- 12 (A) >60    GFR  14 (A) >60    Calcium 8.8 8.3 - 10.6 mg/dL    Total Protein 6.7 6.4 - 8.2 g/dL    Albumin 4.0 3.4 - 5.0 g/dL    Albumin/Globulin Ratio 1.5 1.1 - 2.2    Total Bilirubin <0.2 0.0 - 1.0 mg/dL    Alkaline Phosphatase 72 40 - 129 U/L    ALT 19 10 - 40 U/L    AST 14 (L) 15 - 37 U/L   Urinalysis with Reflex to Culture    Specimen: Urine   Result Value Ref Range    Color, UA Yellow Straw/Yellow    Clarity, UA Clear Clear    Glucose, Ur 100 (A) Negative mg/dL    Bilirubin Urine Negative Negative    Ketones, Urine Negative Negative mg/dL    Specific Gravity, UA 1.020 1.005 - 1.030    Blood, Urine MODERATE (A) Negative    pH, UA 6.0 5.0 - 8.0    Protein,  (A) Negative mg/dL    Urobilinogen, Urine 0.2 <2.0 E.U./dL    Nitrite, Urine Negative Negative    Leukocyte Esterase, Urine SMALL (A) Negative    Microscopic Examination YES     Urine Type NotGiven     Urine Reflex to Culture Yes    Microscopic Urinalysis   Result Value Ref Range WBC, UA 21-50 (A) 0 - 5 /HPF    RBC, UA 11-20 (A) 0 - 4 /HPF    Bacteria, UA Rare (A) None Seen /HPF         RADIOLOGY:  All x-ray studies are viewed/reviewedby me. Formal interpretations per the radiologist are as follows:      CT ABDOMEN PELVIS WO CONTRAST Additional Contrast? None   Final Result   1. Unchanged polycystic kidney disease. EKG:  See EKG interpretation by an attending phsyician      PROCEDURES:   N/A    CRITICAL CARE TIME:   N/A    CONSULTS:  None    Is this patient to be included in the SEP-1 Core Measure due to severe sepsis or septic shock? No   Exclusion criteria - the patient is NOT to be included for SEP-1 Core Measure due to:  2+ SIRS criteria are not met     EMERGENCYDEPARTMENT COURSE and DIFFERENTIAL DIAGNOSIS/MDM:   Vitals:    Vitals:    10/16/22 2045 10/16/22 2156   BP: (!) 147/106    Pulse: 78    Resp: 18    Temp: 97.9 °F (36.6 °C)    TempSrc: Oral    SpO2: 100%    Weight:  225 lb (102.1 kg)   Height:  6' (1.829 m)       Patient was given the following medications:  Medications   ketorolac (TORADOL) injection 30 mg (30 mg IntraVENous Given 10/16/22 2151)         Patient was evaluated by both myself and Mandy Gandhi MD.   Patient presented to the emerged from today with complaints of right flank pain. Patient states the pain started earlier today, sudden onset, no trauma. He denies any vomiting denies nausea. His work-up did show mild leukocytosis, he had a history of kidney stones I did do a Noncon CT which showed no evidence of obstructive uropathy. He does have a history of polycystic kidney disease, he is a current dialysis patient. His creatinine was over 5, he does go to dialysis on Tuesday Thursdays and Saturdays. Urinalysis was consistent with possible UTI with 21-50 white blood cells per high-powered field.   I did have the attending physician evaluate the patient, he does want a contrasted CT study which is ordered and pending, he will follow-up on that.  Patient will be placed on antibiotics, see attending notation for further details for disposition after CT imaging. FINAL IMPRESSION:      1. Acute cystitis without hematuria    2. Right flank pain          DISPOSITION/PLAN:   DISPOSITION      PATIENT REFERRED TO:  No follow-up provider specified.     DISCHARGE MEDICATIONS:  New Prescriptions    No medications on file                  (Please note that portions of this note were completed with a voice recognition program.  Efforts were made to edit the dictations, but occasionally words are mis-transcribed.)    ANA Romo CNP-C (electronically signed)         ANA Romo CNP  10/19/22 3345

## 2022-10-17 NOTE — ED NOTES
Patient left via personal vehicle to private residence in stable condition. Patients vitals were assessed before discharge and were stable. Patient verbalized understanding of discharge instructions, follow up and medications. RN explained information in discharge packet and patient verbalized they have no questions at this time. Patient left ER with all paperwork and belongings that RN is aware of.         Monroe Hernandez RN  10/17/22 6595

## 2022-10-18 NOTE — ED PROVIDER NOTES
Attending Supervisory Note/Shared Visit     I personally saw the patient and performed a substantive portion of the visit including all aspects of the medical decision making as addressed:      50-year-old man with autosomal dominant polycystic kidney disease, ESRD on hemodialysis who presents with right-sided abdominal pain, right flank pain, significant leukocytosis on labs, initial Toradol help with the pain, CT Noncon revealed no obstructive uropathy or renal stone, labs stable from priors, consistent with ESRD. UA is obtained, shows infection but given the degree of the right upper quadrant tenderness on exam, sent back for contrasted study to evaluate for potential acute biliary process, inflammatory process regarding the kidney. CT findings as below, the patient is aware of the mass lesions identified below and has close outpatient follow-up regarding this. He is feeling better after further pain control, overall symptoms thought to be secondary to UTI. He is hemodynamically stable, afebrile, stable for and amenable to discharge home with outpatient follow-up, given return precautions, patient voiced understanding. Discharged home, departed the ED ambulatory in stable condition. CT ABDOMEN PELVIS W IV CONTRAST Additional Contrast? None   Final Result   Autosomal dominant polycystic kidney disease. There is a complex lesion/mass   in the anterior upper pole of the left kidney that has not changed   significantly from the recent studies but is new from 08/20/2020. There is   no significant enhancement on the postcontrast study. As this lesion is new   from 08/20/2020, tumor must be considered. Follow-up MRI may be helpful in   further characterizing this lesion. There is an oval-shaped well-circumscribed low-attenuation soft tissue mass   in the anteromedial dome of the liver of uncertain etiology. This finding is   unchanged from 08/20/2020.   The greater than 2 year stability suggests a benign etiology, possibly focal fat. CT ABDOMEN PELVIS WO CONTRAST Additional Contrast? None   Final Result   1. Unchanged polycystic kidney disease. Medications   ketorolac (TORADOL) injection 30 mg (30 mg IntraVENous Given 10/16/22 2151)   diphenhydrAMINE (BENADRYL) injection 25 mg (25 mg IntraVENous Given 10/16/22 2259)   iopamidol (ISOVUE-370) 76 % injection 75 mL (75 mLs IntraVENous Given 10/16/22 2350)   HYDROmorphone (DILAUDID) injection 1 mg (1 mg IntraVENous Given 10/17/22 0137)   cefTRIAXone (ROCEPHIN) 1,000 mg in dextrose 5 % 50 mL IVPB mini-bag (0 mg IntraVENous Stopped 10/17/22 0211)   predniSONE (DELTASONE) tablet 20 mg (20 mg Oral Given 10/17/22 0137)       I estimate there is LOW risk for (including but not limited to) ACUTE APPENDICITIS, BOWEL OBSTRUCTION, ACUTE CHOLECYSTITIS, RUPTURED DIVERTICULITIS, INCARCERATED or STRANGULATED HERNIA, HEMMORHAGIC PANCREATITIS, or PERFORATED BOWEL/ULCER, thus I consider the discharge disposition reasonable. Jacey Freed (or their surrogate) and I have discussed the diagnosis and risks, and we agree with discharging home with close follow-up. We also discussed returning to the Emergency Department immediately if new or worsening symptoms occur. We have discussed the symptoms which are most concerning that necessitate immediate return.       Morena Du MD  Attending Emergency Physician        Morena Du MD  10/18/22 7101

## 2022-10-19 RX ORDER — OMEPRAZOLE 40 MG/1
CAPSULE, DELAYED RELEASE ORAL
Qty: 90 CAPSULE | Refills: 0 | OUTPATIENT
Start: 2022-10-19

## 2022-10-22 ENCOUNTER — HOSPITAL ENCOUNTER (OUTPATIENT)
Age: 42
Setting detail: OBSERVATION
Discharge: HOME | End: 2022-10-24
Attending: EMERGENCY MEDICINE | Admitting: INTERNAL MEDICINE
Payer: COMMERCIAL

## 2022-10-22 ENCOUNTER — APPOINTMENT (OUTPATIENT)
Dept: CT IMAGING | Age: 42
End: 2022-10-22
Payer: COMMERCIAL

## 2022-10-22 DIAGNOSIS — R10.84 GENERALIZED ABDOMINAL PAIN: ICD-10-CM

## 2022-10-22 DIAGNOSIS — K85.90 ACUTE PANCREATITIS WITHOUT INFECTION OR NECROSIS, UNSPECIFIED PANCREATITIS TYPE: ICD-10-CM

## 2022-10-22 DIAGNOSIS — Z99.2 ENCOUNTER FOR HEMODIALYSIS (HCC): ICD-10-CM

## 2022-10-22 DIAGNOSIS — I10 UNCONTROLLED HYPERTENSION: ICD-10-CM

## 2022-10-22 DIAGNOSIS — N28.89 RENAL MASS: Primary | ICD-10-CM

## 2022-10-22 LAB
A/G RATIO: 1.4 (ref 1.1–2.2)
ALBUMIN SERPL-MCNC: 4.2 G/DL (ref 3.4–5)
ALP BLD-CCNC: 74 U/L (ref 40–129)
ALT SERPL-CCNC: 22 U/L (ref 10–40)
ANION GAP SERPL CALCULATED.3IONS-SCNC: 16 MMOL/L (ref 3–16)
AST SERPL-CCNC: 14 U/L (ref 15–37)
BASOPHILS ABSOLUTE: 0.1 K/UL (ref 0–0.2)
BASOPHILS RELATIVE PERCENT: 1 %
BILIRUB SERPL-MCNC: <0.2 MG/DL (ref 0–1)
BILIRUBIN URINE: NEGATIVE
BLOOD, URINE: ABNORMAL
BUN BLDV-MCNC: 63 MG/DL (ref 7–20)
CALCIUM SERPL-MCNC: 9.7 MG/DL (ref 8.3–10.6)
CHLORIDE BLD-SCNC: 100 MMOL/L (ref 99–110)
CLARITY: CLEAR
CO2: 23 MMOL/L (ref 21–32)
COLOR: YELLOW
CREAT SERPL-MCNC: 5 MG/DL (ref 0.9–1.3)
EOSINOPHILS ABSOLUTE: 0.8 K/UL (ref 0–0.6)
EOSINOPHILS RELATIVE PERCENT: 6.4 %
EPITHELIAL CELLS, UA: ABNORMAL /HPF (ref 0–5)
GFR SERPL CREATININE-BSD FRML MDRD: 14 ML/MIN/{1.73_M2}
GLUCOSE BLD-MCNC: 111 MG/DL (ref 70–99)
GLUCOSE URINE: 100 MG/DL
HCT VFR BLD CALC: 34.1 % (ref 40.5–52.5)
HEMOGLOBIN: 11.2 G/DL (ref 13.5–17.5)
KETONES, URINE: NEGATIVE MG/DL
LACTIC ACID: 0.8 MMOL/L (ref 0.4–2)
LEUKOCYTE ESTERASE, URINE: NEGATIVE
LIPASE: 97 U/L (ref 13–60)
LYMPHOCYTES ABSOLUTE: 2.2 K/UL (ref 1–5.1)
LYMPHOCYTES RELATIVE PERCENT: 18.3 %
MCH RBC QN AUTO: 27.6 PG (ref 26–34)
MCHC RBC AUTO-ENTMCNC: 32.7 G/DL (ref 31–36)
MCV RBC AUTO: 84.5 FL (ref 80–100)
MICROSCOPIC EXAMINATION: YES
MONOCYTES ABSOLUTE: 1.1 K/UL (ref 0–1.3)
MONOCYTES RELATIVE PERCENT: 8.7 %
NEUTROPHILS ABSOLUTE: 8 K/UL (ref 1.7–7.7)
NEUTROPHILS RELATIVE PERCENT: 65.6 %
NITRITE, URINE: NEGATIVE
PDW BLD-RTO: 15.7 % (ref 12.4–15.4)
PH UA: 6 (ref 5–8)
PLATELET # BLD: 341 K/UL (ref 135–450)
PMV BLD AUTO: 6.8 FL (ref 5–10.5)
POTASSIUM REFLEX MAGNESIUM: 4 MMOL/L (ref 3.5–5.1)
PROCALCITONIN: 0.33 NG/ML (ref 0–0.15)
PROTEIN UA: 100 MG/DL
RBC # BLD: 4.04 M/UL (ref 4.2–5.9)
RBC UA: ABNORMAL /HPF (ref 0–4)
SODIUM BLD-SCNC: 139 MMOL/L (ref 136–145)
SPECIFIC GRAVITY UA: 1.02 (ref 1–1.03)
TOTAL PROTEIN: 7.3 G/DL (ref 6.4–8.2)
URINE REFLEX TO CULTURE: ABNORMAL
URINE TYPE: ABNORMAL
UROBILINOGEN, URINE: 0.2 E.U./DL
WBC # BLD: 12.2 K/UL (ref 4–11)
WBC UA: ABNORMAL /HPF (ref 0–5)

## 2022-10-22 PROCEDURE — 96372 THER/PROPH/DIAG INJ SC/IM: CPT

## 2022-10-22 PROCEDURE — 81001 URINALYSIS AUTO W/SCOPE: CPT

## 2022-10-22 PROCEDURE — G0378 HOSPITAL OBSERVATION PER HR: HCPCS

## 2022-10-22 PROCEDURE — 96374 THER/PROPH/DIAG INJ IV PUSH: CPT

## 2022-10-22 PROCEDURE — 96375 TX/PRO/DX INJ NEW DRUG ADDON: CPT

## 2022-10-22 PROCEDURE — 90935 HEMODIALYSIS ONE EVALUATION: CPT

## 2022-10-22 PROCEDURE — 99285 EMERGENCY DEPT VISIT HI MDM: CPT

## 2022-10-22 PROCEDURE — 86706 HEP B SURFACE ANTIBODY: CPT

## 2022-10-22 PROCEDURE — 6370000000 HC RX 637 (ALT 250 FOR IP): Performed by: INTERNAL MEDICINE

## 2022-10-22 PROCEDURE — 6360000002 HC RX W HCPCS: Performed by: INTERNAL MEDICINE

## 2022-10-22 PROCEDURE — 83605 ASSAY OF LACTIC ACID: CPT

## 2022-10-22 PROCEDURE — 2500000003 HC RX 250 WO HCPCS: Performed by: INTERNAL MEDICINE

## 2022-10-22 PROCEDURE — 83690 ASSAY OF LIPASE: CPT

## 2022-10-22 PROCEDURE — 2580000003 HC RX 258: Performed by: INTERNAL MEDICINE

## 2022-10-22 PROCEDURE — 84145 PROCALCITONIN (PCT): CPT

## 2022-10-22 PROCEDURE — 36415 COLL VENOUS BLD VENIPUNCTURE: CPT

## 2022-10-22 PROCEDURE — 2500000003 HC RX 250 WO HCPCS: Performed by: NURSE PRACTITIONER

## 2022-10-22 PROCEDURE — 6360000002 HC RX W HCPCS: Performed by: EMERGENCY MEDICINE

## 2022-10-22 PROCEDURE — 74176 CT ABD & PELVIS W/O CONTRAST: CPT

## 2022-10-22 PROCEDURE — 87340 HEPATITIS B SURFACE AG IA: CPT

## 2022-10-22 PROCEDURE — 85025 COMPLETE CBC W/AUTO DIFF WBC: CPT

## 2022-10-22 PROCEDURE — 80053 COMPREHEN METABOLIC PANEL: CPT

## 2022-10-22 RX ORDER — POLYETHYLENE GLYCOL 3350 17 G/17G
17 POWDER, FOR SOLUTION ORAL DAILY
Status: DISCONTINUED | OUTPATIENT
Start: 2022-10-22 | End: 2022-10-24 | Stop reason: HOSPADM

## 2022-10-22 RX ORDER — 0.9 % SODIUM CHLORIDE 0.9 %
1000 INTRAVENOUS SOLUTION INTRAVENOUS ONCE
Status: DISCONTINUED | OUTPATIENT
Start: 2022-10-22 | End: 2022-10-22

## 2022-10-22 RX ORDER — HEPARIN SODIUM 5000 [USP'U]/ML
5000 INJECTION, SOLUTION INTRAVENOUS; SUBCUTANEOUS EVERY 8 HOURS SCHEDULED
Status: DISCONTINUED | OUTPATIENT
Start: 2022-10-22 | End: 2022-10-24 | Stop reason: HOSPADM

## 2022-10-22 RX ORDER — SODIUM CHLORIDE 9 MG/ML
INJECTION, SOLUTION INTRAVENOUS PRN
Status: DISCONTINUED | OUTPATIENT
Start: 2022-10-22 | End: 2022-10-24 | Stop reason: HOSPADM

## 2022-10-22 RX ORDER — ASCORBIC ACID, THIAMINE, RIBOFLAVIN, NIACINAMIDE, PYRIDOXINE, FOLIC ACID, COBALAMIN, BIOTIN, PANTOTHENIC ACID 100; 1.5; 1.7; 20; 10; 1; 6; 300; 1 MG/1; MG/1; MG/1; MG/1; MG/1; MG/1; UG/1; UG/1; MG/1
TABLET, COATED ORAL
COMMUNITY
Start: 2022-08-08

## 2022-10-22 RX ORDER — POLYETHYLENE GLYCOL 3350 17 G/17G
17 POWDER, FOR SOLUTION ORAL DAILY PRN
Status: DISCONTINUED | OUTPATIENT
Start: 2022-10-22 | End: 2022-10-24 | Stop reason: HOSPADM

## 2022-10-22 RX ORDER — OXYCODONE HYDROCHLORIDE 5 MG/1
TABLET ORAL
Status: ON HOLD | COMMUNITY
Start: 2022-10-14 | End: 2022-10-24 | Stop reason: SDUPTHER

## 2022-10-22 RX ORDER — OXYCODONE HYDROCHLORIDE 5 MG/1
5 TABLET ORAL EVERY 4 HOURS PRN
Status: DISCONTINUED | OUTPATIENT
Start: 2022-10-22 | End: 2022-10-24 | Stop reason: HOSPADM

## 2022-10-22 RX ORDER — ACETAMINOPHEN 650 MG/1
650 SUPPOSITORY RECTAL EVERY 6 HOURS PRN
Status: DISCONTINUED | OUTPATIENT
Start: 2022-10-22 | End: 2022-10-24 | Stop reason: HOSPADM

## 2022-10-22 RX ORDER — ACETAMINOPHEN 325 MG/1
650 TABLET ORAL EVERY 6 HOURS PRN
Status: DISCONTINUED | OUTPATIENT
Start: 2022-10-22 | End: 2022-10-24 | Stop reason: HOSPADM

## 2022-10-22 RX ORDER — NIFEDIPINE 30 MG/1
60 TABLET, EXTENDED RELEASE ORAL NIGHTLY
Status: DISCONTINUED | OUTPATIENT
Start: 2022-10-22 | End: 2022-10-23

## 2022-10-22 RX ORDER — ONDANSETRON 2 MG/ML
4 INJECTION INTRAMUSCULAR; INTRAVENOUS ONCE
Status: COMPLETED | OUTPATIENT
Start: 2022-10-22 | End: 2022-10-22

## 2022-10-22 RX ORDER — SODIUM CHLORIDE 0.9 % (FLUSH) 0.9 %
5-40 SYRINGE (ML) INJECTION EVERY 12 HOURS SCHEDULED
Status: DISCONTINUED | OUTPATIENT
Start: 2022-10-22 | End: 2022-10-24 | Stop reason: HOSPADM

## 2022-10-22 RX ORDER — OXYCODONE HYDROCHLORIDE 5 MG/1
10 TABLET ORAL EVERY 4 HOURS PRN
Status: DISCONTINUED | OUTPATIENT
Start: 2022-10-22 | End: 2022-10-24 | Stop reason: HOSPADM

## 2022-10-22 RX ORDER — MIDODRINE HYDROCHLORIDE 5 MG/1
TABLET ORAL
Status: ON HOLD | COMMUNITY
Start: 2022-10-18 | End: 2022-10-24 | Stop reason: HOSPADM

## 2022-10-22 RX ORDER — METOPROLOL TARTRATE 5 MG/5ML
5 INJECTION INTRAVENOUS ONCE
Status: COMPLETED | OUTPATIENT
Start: 2022-10-22 | End: 2022-10-22

## 2022-10-22 RX ORDER — PANTOPRAZOLE SODIUM 40 MG/1
40 TABLET, DELAYED RELEASE ORAL
Status: DISCONTINUED | OUTPATIENT
Start: 2022-10-23 | End: 2022-10-24 | Stop reason: HOSPADM

## 2022-10-22 RX ORDER — ONDANSETRON 2 MG/ML
4 INJECTION INTRAMUSCULAR; INTRAVENOUS EVERY 6 HOURS PRN
Status: DISCONTINUED | OUTPATIENT
Start: 2022-10-22 | End: 2022-10-24 | Stop reason: HOSPADM

## 2022-10-22 RX ORDER — HEPARIN SODIUM 1000 [USP'U]/ML
4100 INJECTION, SOLUTION INTRAVENOUS; SUBCUTANEOUS PRN
Status: DISCONTINUED | OUTPATIENT
Start: 2022-10-22 | End: 2022-10-24 | Stop reason: HOSPADM

## 2022-10-22 RX ORDER — LABETALOL HYDROCHLORIDE 5 MG/ML
10 INJECTION, SOLUTION INTRAVENOUS ONCE
Status: COMPLETED | OUTPATIENT
Start: 2022-10-22 | End: 2022-10-22

## 2022-10-22 RX ORDER — FAMOTIDINE 20 MG
1000 TABLET ORAL DAILY
COMMUNITY

## 2022-10-22 RX ORDER — PROMETHAZINE HYDROCHLORIDE 25 MG/1
12.5 TABLET ORAL EVERY 6 HOURS PRN
Status: DISCONTINUED | OUTPATIENT
Start: 2022-10-22 | End: 2022-10-24 | Stop reason: HOSPADM

## 2022-10-22 RX ORDER — CLONIDINE HYDROCHLORIDE 0.1 MG/1
0.3 TABLET ORAL 2 TIMES DAILY
Status: DISCONTINUED | OUTPATIENT
Start: 2022-10-22 | End: 2022-10-23

## 2022-10-22 RX ORDER — LABETALOL HYDROCHLORIDE 5 MG/ML
10 INJECTION, SOLUTION INTRAVENOUS EVERY 4 HOURS PRN
Status: DISCONTINUED | OUTPATIENT
Start: 2022-10-22 | End: 2022-10-24 | Stop reason: HOSPADM

## 2022-10-22 RX ORDER — SEVELAMER CARBONATE 800 MG/1
TABLET, FILM COATED ORAL
Status: ON HOLD | COMMUNITY
Start: 2022-10-18 | End: 2022-10-24

## 2022-10-22 RX ORDER — SODIUM CHLORIDE 0.9 % (FLUSH) 0.9 %
5-40 SYRINGE (ML) INJECTION PRN
Status: DISCONTINUED | OUTPATIENT
Start: 2022-10-22 | End: 2022-10-24 | Stop reason: HOSPADM

## 2022-10-22 RX ADMIN — LABETALOL HYDROCHLORIDE 10 MG: 5 INJECTION INTRAVENOUS at 16:00

## 2022-10-22 RX ADMIN — OXYCODONE 10 MG: 5 TABLET ORAL at 19:52

## 2022-10-22 RX ADMIN — METOPROLOL TARTRATE 5 MG: 5 INJECTION INTRAVENOUS at 22:54

## 2022-10-22 RX ADMIN — ONDANSETRON 4 MG: 2 INJECTION INTRAMUSCULAR; INTRAVENOUS at 08:44

## 2022-10-22 RX ADMIN — HEPARIN SODIUM 5000 UNITS: 5000 INJECTION INTRAVENOUS; SUBCUTANEOUS at 20:46

## 2022-10-22 RX ADMIN — ACETAMINOPHEN 650 MG: 325 TABLET ORAL at 15:44

## 2022-10-22 RX ADMIN — NIFEDIPINE 60 MG: 30 TABLET, EXTENDED RELEASE ORAL at 19:52

## 2022-10-22 RX ADMIN — Medication 10 ML: at 19:52

## 2022-10-22 RX ADMIN — CLONIDINE HYDROCHLORIDE 0.3 MG: 0.1 TABLET ORAL at 19:52

## 2022-10-22 RX ADMIN — HYDROMORPHONE HYDROCHLORIDE 1 MG: 1 INJECTION, SOLUTION INTRAMUSCULAR; INTRAVENOUS; SUBCUTANEOUS at 08:44

## 2022-10-22 RX ADMIN — OXYCODONE 10 MG: 5 TABLET ORAL at 15:44

## 2022-10-22 ASSESSMENT — PAIN DESCRIPTION - PAIN TYPE: TYPE: ACUTE PAIN

## 2022-10-22 ASSESSMENT — PAIN DESCRIPTION - DESCRIPTORS
DESCRIPTORS: STABBING
DESCRIPTORS: STABBING
DESCRIPTORS: SHOOTING;STABBING

## 2022-10-22 ASSESSMENT — PAIN DESCRIPTION - LOCATION
LOCATION: ABDOMEN;HEAD
LOCATION: ABDOMEN;HEAD
LOCATION: ABDOMEN
LOCATION: ABDOMEN
LOCATION: ABDOMEN;HEAD
LOCATION: ABDOMEN
LOCATION: ABDOMEN

## 2022-10-22 ASSESSMENT — PAIN - FUNCTIONAL ASSESSMENT
PAIN_FUNCTIONAL_ASSESSMENT: 0-10
PAIN_FUNCTIONAL_ASSESSMENT: ACTIVITIES ARE NOT PREVENTED
PAIN_FUNCTIONAL_ASSESSMENT: 0-10

## 2022-10-22 ASSESSMENT — PAIN SCALES - GENERAL
PAINLEVEL_OUTOF10: 4
PAINLEVEL_OUTOF10: 3
PAINLEVEL_OUTOF10: 7
PAINLEVEL_OUTOF10: 3
PAINLEVEL_OUTOF10: 8
PAINLEVEL_OUTOF10: 7
PAINLEVEL_OUTOF10: 3
PAINLEVEL_OUTOF10: 8

## 2022-10-22 ASSESSMENT — PAIN SCALES - WONG BAKER
WONGBAKER_NUMERICALRESPONSE: 2
WONGBAKER_NUMERICALRESPONSE: 0
WONGBAKER_NUMERICALRESPONSE: 0

## 2022-10-22 ASSESSMENT — PAIN DESCRIPTION - FREQUENCY: FREQUENCY: CONTINUOUS

## 2022-10-22 ASSESSMENT — PAIN DESCRIPTION - ORIENTATION
ORIENTATION: RIGHT

## 2022-10-22 ASSESSMENT — PAIN DESCRIPTION - ONSET: ONSET: ON-GOING

## 2022-10-22 NOTE — ED NOTES
2777 - called [facility] Pt placement and spoke with Estelle Grover to reach admitting attending for possible transfer of pt. [facility]'s renal transplant unit is d/c-dependent at this time  Re: needs HD, HTN, renal mass, pancreatitis; part of renal transplant team at [facility] - Dr Danial Verma called back via [facility] pt placement to speak with Dr Omar Reynolds.  Awaiting call from renal transplant MD  1102 - Dr Beth Mukherjee ([facility] nephro) called back to speak with Dr Elisabet Zepeda  10/22/22 6309

## 2022-10-22 NOTE — PROGRESS NOTES
4 Eyes Skin Assessment     The patient is being assess for  Admission    I agree that 2 RN's have performed a thorough Head to Toe Skin Assessment on the patient. ALL assessment sites listed below have been assessed. Areas assessed by both nurses: Bryce Johnston [facility] TAYA RN  [x]   Head, Face, and Ears   [x]   Shoulders, Back, and Chest  [x]   Arms, Elbows, and Hands   [x]   Coccyx, Sacrum, and Ischum  [x]   Legs, Feet, and Heels        Does the Patient have Skin Breakdown?   No         Elías Prevention initiated:  No   Wound Care Orders initiated:  No      [facility] nurse consulted for Pressure Injury (Stage 3,4, Unstageable, DTI, NWPT, and Complex wounds):  No      Nurse 1 eSignature: Electronically signed by Paul Nieto RN on 10/22/22 at 5:46 PM EDT    **SHARE this note so that the co-signing nurse is able to place an eSignature**    Nurse 2 eSignature: Electronically signed by Hortencia Montes RN on 10/22/22 at 5:46 PM EDT

## 2022-10-22 NOTE — PROGRESS NOTES
Pt transferred from dialysis to room 347. Pt oriented to room, belongings are at bedside. Pts BP upon arrival was 182/112, MD paged and labetalol ordered and given per [facility], current BP is 160/113. Pt c/o 9/10 headache and flank pain, PRN pain medication given per MAR. Pt is A&Ox4. Denies any further needs at this time. Bed is locked and in lowest position. Call light and bedside table are within reach.

## 2022-10-22 NOTE — CONSULTS
[address] [address]                                  CONSULTATION    PATIENT NAME: Dexter                       :        1980  MED REC NO:   [id number]                          ROOM:       G2  ACCOUNT NO:   [de-identified]                           ADMIT DATE: 10/22/2022  PROVIDER:     Mahesh Uribe MD    CONSULT DATE:  10/22/2022    REASON FOR CONSULTATION:  End-stage renal disease management. HISTORY OF PRESENT ILLNESS:  The patient is a 81-year-old  male  patient with a past medical history significant for end-stage renal  disease, on hemodialysis every Tuesday, Thursday and Saturday. The  patient has ultrasound with dominant polycystic kidney disease with a  severely enlarged bilateral kidneys scheduled for bilateral nephrectomy  later this year with Dr. Mahesh Sosa. The patient presented to [facility] complaining of worsening right flank pain, but with no fever,  chills, dysuria or hematuria. The patient is being admitted for further  evaluation and Nephrology was consulted for further management of his  dialysis needs. PAST MEDICAL HISTORY:  1. End-stage disease. 2.  Autosomal dominant polycystic kidney disease. 3.  Hypertension. 4.  Hyperlipidemia. 5.  Attention deficit disorder. PAST SURGICAL HISTORY:  1.  Umbilical hernia repair. 2.  Vasectomy. 3.  EGD. ALLERGIES:  The patient is allergic to HYDROCODONE, IODINE, NONSTEROIDAL  ANTI-INFLAMMATORY DRUGS and SHELLFISH PRODUCTS. SOCIAL HISTORY:  The patient quit smoking few years ago and does not  drink alcohol. FAMILY HISTORY:  Significant for congestive heart failure and kidney  disease in his mother. REVIEW OF SYSTEMS:  The patient denied any nausea, vomiting or abdominal  pain. Otherwise, a 10-point review of systems was relatively  unremarkable.     PHYSICAL EXAMINATION:  VITAL SIGNS:  Blood pressure 148/109, heart rate 89, respirations 18,  temperature 97.9 Fahrenheit. The patient satting 100% on room air. GENERAL APPEARANCE:  The patient is alert and oriented x3, not in acute  distress. HEENT:  Eyes revealed normal conjunctivae and reactive pupils. NECK:  Revealed midline trachea and nonpalpable thyroid. LUNGS:  Clear to anterior auscultation bilaterally, nonlabored  breathing. CARDIOVASCULAR EXAM:  Revealed S1 and S2, regular rate and rhythm. No  added murmurs or rubs. No peripheral edema. ABDOMINAL EXAM:  Revealed distended, firm abdomen. Slightly tender to  palpation. No guarding. SKIN:  Warm to touch. PSYCHIATRIC:  Revealed good judgment and insight. LYMPHATICS:  Revealed no cervical or axillary adenopathies. ASSESSMENT AND PLAN:  1. End-stage disease. We will arrange for hemodialysis today per  Tuesday, Thursday, Saturday schedule. 2.  No significant electrolyte disorders noted. 3.  Hypertension, blood pressure slightly elevated, we will reassess  after dialysis. 4.  Anemia, hemoglobin above target for ESRD, we will hold  erythropoietin stimulating agents and monitor.   5.  Abdominal pain, management per primary team.        Pito Spencer MD    D: 10/22/2022 12:27:56       T: 10/22/2022 12:31:46     [email or web address]  Job#: [id number]     Doc#: [id number]    CC:

## 2022-10-22 NOTE — H&P
Hospital Medicine History & Physical      PCP: Christina Chapa DO    Date of Admission: 10/22/2022    Date of Service: Pt seen/examined on 10/22/2022 and Placed in Observation. Chief Complaint:  Abdominal pain      History Of Present Illness:      40 y.o. male who presented to [facility] with Abdominal pain. Medical history of ESRD on HD TTS and polycystic kidney disease with severely enlarged bilateral kidneys secheduled for bilateral nephrectomy later this year with renal transplant. He developed worsening mid abdominal discomfort 2 days ago. He took ibuprofen with not much relief. No change in appetite. He does have constipation and has a BM about twice per week. He was seen in ED on 10/17 for similar presentation and started on cefdinir. He started to feel well but pain returned. He was scheduled to get dialysis today but due to HTN he did not receive HD. He took midodrine this morning. Otherwise he does not have any chest pain, dyspnea, fever, chills, nausea, vomiting, dysuria,. Past Medical History:          Diagnosis Date    ADD (attention deficit disorder with hyperactivity)     CKD (chronic kidney disease) stage 5, GFR less than 15 ml/min ([facility])     ADPCKD    Hyperlipidemia     Hypertension     PKD (polycystic kidney disease)        Past Surgical History:          Procedure Laterality Date    HERNIA REPAIR      OTHER SURGICAL HISTORY Bilateral 5/15/13    BILATERAL LAPAROSCOPIC PREPERITONEAL INGUINAL HERNIA REPAIR    UMBILICAL HERNIA REPAIR N/A 3/36/5203    OPEN UMBILICAL HERNIA REPAIR performed by Angelica Monk MD at [address] [place] [address]  3/15/2011    VASECTOMY      WISDOM TOOTH EXTRACTION         Medications Prior to Admission:      Prior to Admission medications    Medication Sig Start Date End Date Taking?  Authorizing Provider   Vitamin D, Cholecalciferol, 25 MCG (1000 UT) CAPS Take 1,000 Units by mouth daily    Historical Provider, MD MccartyDONE (ROXICODONE) 5 MG immediate release tablet  10/14/22   Historical Provider, MD   midodrine (PROAMATINE) 5 MG tablet  10/18/22   Historical Provider, MD   sevelamer (RENVELA) 800 MG tablet  10/18/22   Historical Provider, MD HUGHES Complex-C-Folic Acid (DIALYVITE TABLET) TABS TAKE 1 TABLET BY MOUTH EVERY DAY 8/8/22   Historical Provider, MD   cefdinir (OMNICEF) 300 MG capsule Take 1 capsule by mouth daily for 7 days 10/17/22 10/24/22  Santosh Mccarty MD   methylPREDNISolone (MEDROL, URIEL,) 4 MG tablet Take by mouth. 10/17/22   Santosh Mccarty MD   omeprazole (PRILOSEC) 40 MG delayed release capsule TAKE 1 CAPSULE BY MOUTH EVERY DAY IN THE MORNING BEFORE BREAKFAST  Patient not taking: Reported on 10/22/2022 8/5/22   Liam Encinas DO   cloNIDine (CATAPRES) 0.3 MG tablet Take 1 tablet by mouth 3 times daily 5/4/22   Stephen Oates MD   NIFEdipine (PROCARDIA XL) 30 MG extended release tablet Take 2 tablets by mouth daily 5/5/22   Stephen Oates MD   ferrous sulfate (IRON 325) 325 (65 Fe) MG tablet Take 1 tablet by mouth 2 times daily  Patient not taking: No sig reported 5/4/22   Stephen Oates MD   citalopram (CELEXA) 10 MG tablet TAKE 1 TABLET BY MOUTH EVERY DAY 11/12/21   Liam Encinas DO   simvastatin (ZOCOR) 40 MG tablet TAKE 1 TABLET BY MOUTH EVERY DAY AT NIGHT 10/27/21   Liam Encinas DO   allopurinol (ZYLOPRIM) 100 MG tablet TAKE 1 TABLET BY MOUTH EVERY DAY 4/23/21   Liam Encinas DO       Allergies:  Bee pollen, Hydrocodone, Iodine, Nsaids, Peanut-containing drug products, and Shellfish-derived products    Social History:      The patient currently lives Home    TOBACCO:   reports that he has quit smoking. His smoking use included cigarettes. He has a 6.50 pack-year smoking history. He has never used smokeless tobacco.  ETOH:   reports current alcohol use.   E-cigarette/Vaping       Questions Responses    E-cigarette/Vaping Use Never User    Start Date     Passive Exposure     Quit Date Counseling Given     Comments               Family History:       Reviewed and negative in regards to presenting illness/complaint. Problem Relation Age of Onset    Kidney Disease Mother     Heart Disease Mother         CHF    High Blood Pressure Sister     High Cholesterol Sister     Other Sister         PKD    High Blood Pressure Brother     High Cholesterol Brother     Other Brother         PKD       REVIEW OF SYSTEMS COMPLETED:   Pertinent positives as noted in the HPI. All other systems reviewed and negative. PHYSICAL EXAM PERFORMED:    BP (!) 158/115   Pulse 73   Temp (!) 96.7 °F (35.9 °C) (Oral)   Resp 12   Ht 6' (1.829 m)   Wt 224 lb (101.6 kg)   SpO2 95%   BMI 30.38 kg/m²     General appearance:  No apparent distress, appears stated age and cooperative. HEENT:  Normal cephalic, atraumatic without obvious deformity. Pupils equal, round, and reactive to light. Extra ocular muscles intact. Conjunctivae/corneas clear. Neck: Supple, with full range of motion. No jugular venous distention. Trachea midline. Respiratory:  Normal respiratory effort. Clear to auscultation, bilaterally without Rales/Wheezes/Rhonchi. Cardiovascular:  Regular rate and rhythm with normal S1/S2 without murmurs, rubs or gallops. Abdomen: Very distended. Masses can be felt throughout entire abdomen. Normoactive bowel sounds. Musculoskeletal:  No clubbing, cyanosis or edema bilaterally. Full range of motion without deformity. Skin: Skin color, texture, turgor normal.  No rashes or lesions. Neurologic:  Neurovascularly intact without any focal sensory/motor deficits.  Cranial nerves: II-XII intact, grossly non-focal.  Psychiatric:  Alert and oriented, thought content appropriate, normal insight  Capillary Refill: Brisk,3 seconds, normal  Peripheral Pulses: +2 palpable, equal bilaterally       Labs:     Recent Labs     10/22/22  0746   WBC 12.2*   HGB 11.2*   HCT 34.1*        Recent Labs     10/22/22  0746    K 4.0      CO2 23   BUN 63*   CREATININE 5.0*   CALCIUM 9.7     Recent Labs     10/22/22  0746   AST 14*   ALT 22   BILITOT <0.2   ALKPHOS 74     No results for input(s): INR in the last 72 hours. No results for input(s): Janise Seip in the last 72 hours. Urinalysis:      Lab Results   Component Value Date/Time    NITRU Negative 10/22/2022 07:55 AM    WBCUA 3-5 10/22/2022 07:55 AM    BACTERIA Rare 10/16/2022 10:15 PM    RBCUA 5-10 10/22/2022 07:55 AM    BLOODU SMALL 10/22/2022 07:55 AM    SPECGRAV 1.020 10/22/2022 07:55 AM    GLUCOSEU 100 10/22/2022 07:55 AM    GLUCOSEU NEGATIVE 09/21/2011 10:18 AM       Radiology:       CT ABDOMEN PELVIS WO CONTRAST Additional Contrast? None   Final Result   No acute abdominopelvic abnormality is identified. Polycystic kidney disease is again noted. There are areas of enhancement and   with kidneys, incompletely characterized, favored to represent residual   parenchyma. Several foci in the right kidney are significantly changed in   the interval.  Further characterization by MRI with gadolinium is   recommended, when feasible. Consults:    IP CONSULT TO HOSPITALIST  IP CONSULT TO HOSPITALIST    ASSESSMENT:    There are no active hospital problems to display for this patient. PLAN:  Abdominal pain   Lipase is slightly elevated but not 3x upper limit of normal, pancreas unremarkable and not classic pancreatitis pain. As patient has ESRD will monitor patient overnight without aggressive fluids  No renal stones, diverticulitis, or other acute findings   Likely from very large kidneys as seen on CT Abdomen that shows significant changes in sizes of rfenal cyst in right kidney where most of his pain is. Constipation from very large organ compression could also contribute.    Will have miralax daily to keep bowels soft  Oxycodone started     Uncontrolled HTN  Nephrology consulted - thank yo u  HD today   Persistent HTN after dailysis, patient took midodrine this AM   Labetalol 1x   Labetalol PRN and start home clonidine and nifedipine tonight   May consider changing midodrine outpatient to as needed     ESRD - continue TTS treatment       DVT Prophylaxis: Heparin  Diet: No diet orders on file  Code Status: Prior    PT/OT Eval Status: Not indicated    Dispo - Possible dc tomorrow pending improvement of pain control and BP control        Wellington Villa DO    Thank you Blanca Minor DO for the opportunity to be involved in this patient's care. If you have any questions or concerns please feel free to contact me at [phone].

## 2022-10-22 NOTE — PLAN OF CARE
Problem: Pain  Goal: Verbalizes/displays adequate comfort level or baseline comfort level  Outcome: Progressing  Flowsheets (Taken 10/22/2022 1935)  Verbalizes/displays adequate comfort level or baseline comfort level:   Encourage patient to monitor pain and request assistance   Assess pain using appropriate pain scale   Implement non-pharmacological measures as appropriate and evaluate response   Administer analgesics based on type and severity of pain and evaluate response   Notify Licensed Independent Practitioner if interventions unsuccessful or patient reports new pain   Consider cultural and social influences on pain and pain management

## 2022-10-22 NOTE — PROGRESS NOTES
Treatment time: 3 hours  Net UF: 958 ml     Pre weight: 101.9 kg  Post weight:100.8 kg  EDW: 100.5 kg      Access used: RTDC    Access function: well with  ml/min     Medications or blood products given: heparin dwells     Regular outpatient schedule: TTS     Summary of response to treatment: Patient tolerated treatment well and without any complications. Patient remained stable throughout entire treatment        10/22/22 1215 10/22/22 1450   Treatment   Time On 1223  --    Time Off  --  1450   Treatment Goal 1400 1358   Vital Signs   BP (!) 158/100 (!) 176/112   Temp 98.1 °F (36.7 °C) 98 °F (36.7 °C)   Heart Rate 76 86   Resp 18 16   Weight 224 lb 9 oz (101.9 kg) 222 lb 7.1 oz (100.9 kg)   Technical Checks   Machine Alarm Self Test Completed; Passed  --    Dialysis Bath   K+ (Potassium) 3  --    Ca+ (Calcium) 2.5  --    Na+ (Sodium) 138  --    HCO3 (Bicarb) 34  --

## 2022-10-22 NOTE — ED PROVIDER NOTES
CHIEF COMPLAINT  Abdominal Pain and Flank Pain (Right sided. Seen here Sunday for same thing and had 2 ct scans)      HISTORY OF PRESENT ILLNESS  Lucinda Lowry is a 40 y.o. male with a history of end-stage renal disease on hemodialysis, hypertension, dyslipidemia who presents to the ED complaining of abdominal pain. Patient reports right-sided abdominal pain worse in the right upper quadrant. This has been ongoing along with occasional flank pain for the past 6 days. Was seen previously and started on cefdinir on 10/17/2022. It has not helped. Previous imaging this week demonstrated a new renal mass. Patient states he has not yet followed up for MRI. Tentatively planning on bilateral nephrectomy due to his worsening polycystic kidneys. He is hoping ultimately for a renal transplant. Followed at [facility] by Dr Era Chávez. He denies chest pain, cough, dyspnea, fever, chills, nausea, vomiting, gross hematuria, diarrhea, constipation, hematochezia, melena. Pain is moderate in intensity. No definite exacerbating or relieving factors. He went for dialysis this morning however due to elevated blood pressure they declined him. No other complaints, modifying factors or associated symptoms. I have reviewed the following from the nursing documentation.     Past Medical History:   Diagnosis Date    ADD (attention deficit disorder with hyperactivity)     CKD (chronic kidney disease) stage 5, GFR less than 15 ml/min ([facility])     ADPCKD    Hyperlipidemia     Hypertension     PKD (polycystic kidney disease)      Past Surgical History:   Procedure Laterality Date    HERNIA REPAIR      OTHER SURGICAL HISTORY Bilateral 5/15/13    BILATERAL LAPAROSCOPIC PREPERITONEAL INGUINAL HERNIA REPAIR    UMBILICAL HERNIA REPAIR N/A 0/02/7913    OPEN UMBILICAL HERNIA REPAIR performed by Aury Thomas MD at [address] [place] [address]  3/15/2011    VASECTOMY      WISDOM TOOTH EXTRACTION       Family History Problem Relation Age of Onset    Kidney Disease Mother     Heart Disease Mother         CHF    High Blood Pressure Sister     High Cholesterol Sister     Other Sister         PKD    High Blood Pressure Brother     High Cholesterol Brother     Other Brother         PKD     Social History     Socioeconomic History    Marital status:      Spouse name: Not on file    Number of children: Not on file    Years of education: Not on file    Highest education level: Not on file   Occupational History    Not on file   Tobacco Use    Smoking status: Former     Packs/day: 0.25     Years: 26.00     Pack years: 6.50     Types: Cigarettes    Smokeless tobacco: Never    Tobacco comments:     1 pack per month   Vaping Use    Vaping Use: Never used   Substance and Sexual Activity    Alcohol use: Yes     Comment: rarely    Drug use: Not Currently    Sexual activity: Yes     Partners: Female   Other Topics Concern    Not on file   Social History Narrative    Not on file     Social Determinants of Health     Financial Resource Strain: Not on file   Food Insecurity: Not on file   Transportation Needs: Not on file   Physical Activity: Not on file   Stress: Not on file   Social Connections: Not on file   Intimate Partner Violence: Not on file   Housing Stability: Not on file     Current Facility-Administered Medications   Medication Dose Route Frequency Provider Last Rate Last Admin    sodium chloride flush 0.9 % injection 5-40 mL  5-40 mL IntraVENous 2 times per day Marcello Villa, DO        sodium chloride flush 0.9 % injection 5-40 mL  5-40 mL IntraVENous PRN Marcello De La Rosaess, DO        0.9 % sodium chloride infusion   IntraVENous PRN Marcello De La Rosaess, DO        polyethylene glycol (GLYCOLAX) packet 17 g  17 g Oral Daily PRN Marcello Villa, DO        acetaminophen (TYLENOL) tablet 650 mg  650 mg Oral Q6H PRN Marcello Villa, DO   650 mg at 10/22/22 1544    Or    acetaminophen (TYLENOL) suppository 650 mg  650 mg Rectal Q6H PRN Marcello Harkless, DO        promethazine (PHENERGAN) tablet 12.5 mg  12.5 mg Oral Q6H PRN Marcello Harkless, DO        Or    ondansetron (ZOFRAN) injection 4 mg  4 mg IntraVENous Q6H PRN Marcello Johnkless, DO        heparin (porcine) injection 5,000 Units  5,000 Units SubCUTAneous 3 times per day Marcello Rjess, DO        oxyCODONE (ROXICODONE) immediate release tablet 5 mg  5 mg Oral Q4H PRN Marcello Harkless, DO        Or    oxyCODONE (ROXICODONE) immediate release tablet 10 mg  10 mg Oral Q4H PRN Marcello Johnkless, DO   10 mg at 10/22/22 1544    heparin (porcine) injection 4,100 Units  4,100 Units IntraCATHeter PRN Georgette Mai MD        cloNIDine (CATAPRES) tablet 0.3 mg  0.3 mg Oral BID Marcello Rjess, DO        NIFEdipine (PROCARDIA XL) extended release tablet 60 mg  60 mg Oral Nightly Marcello Harkless, DO        labetalol (NORMODYNE;TRANDATE) injection 10 mg  10 mg IntraVENous Q4H PRN Marcello Harkless, DO        polyethylene glycol (GLYCOLAX) packet 17 g  17 g Oral Daily Marcello Allen, DO        [START ON 10/23/2022] pantoprazole (PROTONIX) tablet 40 mg  40 mg Oral QAM AC Marcello Rjess, DO         Allergies   Allergen Reactions    Bee Pollen     Hydrocodone Itching    Iodine Swelling    Nsaids      Does not take due to kidney function issues    Peanut-Containing Drug Products      Reno tree dust     Shellfish-Derived Products        REVIEW OF SYSTEMS  10 systems reviewed, pertinent positives per HPI otherwise noted to be negative. PHYSICAL EXAM  BP (!) 160/113   Pulse 90   Temp 98 °F (36.7 °C) (Oral)   Resp 16   Ht 6' (1.829 m)   Wt 222 lb 7.1 oz (100.9 kg)   SpO2 98%   BMI 30.17 kg/m²   GENERAL APPEARANCE: Awake and alert. Cooperative. No acute distress. Appears chronically ill but not acutely toxic. HEAD: Normocephalic. Atraumatic. EYES: PERRL. EOM's grossly intact. ENT: Mucous membranes are moist.   NECK: Supple, trachea midline. HEART: RRR. Normal S1, S2.  No murmurs, rubs or gallops. LUNGS: Respirations unlabored. CTAB. Good air exchange. No wheezes, rales, or rhonchi. Speaking comfortably in full sentences. ABDOMEN: Soft. Protuberant abdomen but not necessarily distended. Mild generalized tenderness with moderate tenderness in the epigastric and right upper quadrant region. No guarding or rebound. Normal Bowel sounds. EXTREMITIES: No peripheral edema. MAEE. No acute deformities. SKIN: Warm and dry. No acute rashes. NEUROLOGICAL: Alert and oriented X 3. CN II-XII intact. No gross facial drooping. Strength 5/5 in all extremities. Sensation intact. No pronator drift. Normal coordination. Gait normal.   PSYCHIATRIC: Normal mood and affect. LABS  I have reviewed all labs for this visit.    Results for orders placed or performed during the hospital encounter of 10/22/22   CBC with Auto Differential   Result Value Ref Range    WBC 12.2 (H) 4.0 - 11.0 K/uL    RBC 4.04 (L) 4.20 - 5.90 M/uL    Hemoglobin 11.2 (L) 13.5 - 17.5 g/dL    Hematocrit 34.1 (L) 40.5 - 52.5 %    MCV 84.5 80.0 - 100.0 fL    MCH 27.6 26.0 - 34.0 pg    MCHC 32.7 31.0 - 36.0 g/dL    RDW 15.7 (H) 12.4 - 15.4 %    Platelets 192 987 - 752 K/uL    MPV 6.8 5.0 - 10.5 fL    Neutrophils % 65.6 %    Lymphocytes % 18.3 %    Monocytes % 8.7 %    Eosinophils % 6.4 %    Basophils % 1.0 %    Neutrophils Absolute 8.0 (H) 1.7 - 7.7 K/uL    Lymphocytes Absolute 2.2 1.0 - 5.1 K/uL    Monocytes Absolute 1.1 0.0 - 1.3 K/uL    Eosinophils Absolute 0.8 (H) 0.0 - 0.6 K/uL    Basophils Absolute 0.1 0.0 - 0.2 K/uL   CMP w/ Reflex to MG   Result Value Ref Range    Sodium 139 136 - 145 mmol/L    Potassium reflex Magnesium 4.0 3.5 - 5.1 mmol/L    Chloride 100 99 - 110 mmol/L    CO2 23 21 - 32 mmol/L    Anion Gap 16 3 - 16    Glucose 111 (H) 70 - 99 mg/dL    BUN 63 (H) 7 - 20 mg/dL    Creatinine 5.0 (H) 0.9 - 1.3 mg/dL    Est, Glom Filt Rate 14 (A) >60    Calcium 9.7 8.3 - 10.6 mg/dL    Total Protein 7.3 6.4 - 8.2 g/dL    Albumin 4.2 3.4 - 5.0 g/dL    Albumin/Globulin Ratio 1.4 1.1 - 2.2    Total Bilirubin <0.2 0.0 - 1.0 mg/dL    Alkaline Phosphatase 74 40 - 129 U/L    ALT 22 10 - 40 U/L    AST 14 (L) 15 - 37 U/L   Lipase   Result Value Ref Range    Lipase 97.0 (H) 13.0 - 60.0 U/L   Urinalysis with Reflex to Culture    Specimen: Urine   Result Value Ref Range    Color, UA Yellow Straw/Yellow    Clarity, UA Clear Clear    Glucose, Ur 100 (A) Negative mg/dL    Bilirubin Urine Negative Negative    Ketones, Urine Negative Negative mg/dL    Specific Gravity, UA 1.020 1.005 - 1.030    Blood, Urine SMALL (A) Negative    pH, UA 6.0 5.0 - 8.0    Protein,  (A) Negative mg/dL    Urobilinogen, Urine 0.2 <2.0 E.U./dL    Nitrite, Urine Negative Negative    Leukocyte Esterase, Urine Negative Negative    Microscopic Examination YES     Urine Type NotGiven     Urine Reflex to Culture Not Indicated    Lactic Acid   Result Value Ref Range    Lactic Acid 0.8 0.4 - 2.0 mmol/L   Procalcitonin   Result Value Ref Range    Procalcitonin 0.33 (H) 0.00 - 0.15 ng/mL   Microscopic Urinalysis   Result Value Ref Range    WBC, UA 3-5 0 - 5 /HPF    RBC, UA 5-10 (A) 0 - 4 /HPF    Epithelial Cells, UA 0-1 0 - 5 /HPF           RADIOLOGY  X-RAYS:  I have reviewed radiologic plain film image(s). ALL OTHER NON-PLAIN FILM IMAGES SUCH AS CT, ULTRASOUND AND MRI HAVE BEEN READ BY THE RADIOLOGIST. CT ABDOMEN PELVIS WO CONTRAST Additional Contrast? None   Final Result   No acute abdominopelvic abnormality is identified. Polycystic kidney disease is again noted. There are areas of enhancement and   with kidneys, incompletely characterized, favored to represent residual   parenchyma. Several foci in the right kidney are significantly changed in   the interval.  Further characterization by MRI with gadolinium is   recommended, when feasible. Rechecks: Physical assessment performed. Appears nontoxic    ED COURSE/MDM  Patient seen and evaluated.  Old records reviewed. Labs and imaging reviewed and results discussed with patient. Patient was declined for hemodialysis this morning due to elevated blood pressure. He has abdominal pain worse in the right upper quadrant ongoing for the last 6 days. Was started on cefdinir on 10/17/2022 for possible UTI however symptoms have not improved. Reports some gastrointestinal issues including constipation. Also found to have an elevated lipase. Patient has extremely large polycystic kidneys bilaterally with new mass evident on CT. He is followed by multiple specialists including Dr. Abhay Fitzgerald and also Dr. Guevara Lopez of transplant surgery at [facility].  Tentatively scheduled for bilateral nephrectomy hopefully with subsequent renal transplant. I suspect a lot of his symptoms are due to mass-effect from his kidneys. He requires further work-up including MRI with gadolinium. I spoke with the hospitalist service as well as kidney team at that facility and they declined to accept him for transfer for further work-up. They recommend keeping him at this facility for dialysis, pain control, trending of lipase is and they will follow-up with him on an outpatient basis later this week. Current Discharge Medication List          CLINICAL IMPRESSION  1. Renal mass    2. Generalized abdominal pain    3. Acute pancreatitis without infection or necrosis, unspecified pancreatitis type    4. Uncontrolled hypertension    5. Encounter for hemodialysis ([facility] [address]        Blood pressure (!) 160/113, pulse 90, temperature 98 °F (36.7 °C), temperature source Oral, resp. rate 16, height 6' (1.829 m), weight 222 lb 7.1 oz (100.9 kg), SpO2 98 %. Kia Song was admitted in stable condition.         Marianela Mauricio MD  10/22/22 0138

## 2022-10-22 NOTE — CONSULTS
Patient seen and examined, consult note dictated. Assessment and Plan:    1- ESRD: We will arrange for hemodialysis today per TThS schedule. 2- No significant electrolytes disorders noted. 3- HTN: Blood pressure slightly elevated, will re-assess after dialysis. 4- Anemia: Hemoglobin above target for ESRD, hold PALMER and monitor.   5- Abdominal pain: Management per primary team.

## 2022-10-22 NOTE — PROGRESS NOTES
PS to Knabb @ 1939 - \"Hey there. Pt with BP of 162/112, pain 8/10. Pt has labetalol q4 but only for SBP >180. .. Pt has headache and ABD pain at 8/10. Do you want to order anything for the dystolic? He will get clonidine and procardia XL here in a moment when I bring him his q4 roxicodone. .. Also, pt here with pancreatitis, and eating chikfila. .. do we want to make him NPO to aid with the ABD pain? Thank you! \"    No new orders, will continue to reassess. PS to Knabb @ 0399 - \"Pt BP now 161/111, HR 83, headache 1/10, RUQ ABD pain 3/10. \"    PS to Knabb @ 0036 - \"Last /100. \"    No new orders, will continue to reassess.

## 2022-10-23 LAB
A/G RATIO: 1.3 (ref 1.1–2.2)
ALBUMIN SERPL-MCNC: 3.9 G/DL (ref 3.4–5)
ALP BLD-CCNC: 68 U/L (ref 40–129)
ALT SERPL-CCNC: 18 U/L (ref 10–40)
ANION GAP SERPL CALCULATED.3IONS-SCNC: 11 MMOL/L (ref 3–16)
AST SERPL-CCNC: 12 U/L (ref 15–37)
BASOPHILS ABSOLUTE: 0.1 K/UL (ref 0–0.2)
BASOPHILS RELATIVE PERCENT: 0.9 %
BILIRUB SERPL-MCNC: <0.2 MG/DL (ref 0–1)
BUN BLDV-MCNC: 41 MG/DL (ref 7–20)
CALCIUM SERPL-MCNC: 8.9 MG/DL (ref 8.3–10.6)
CHLORIDE BLD-SCNC: 97 MMOL/L (ref 99–110)
CO2: 26 MMOL/L (ref 21–32)
CREAT SERPL-MCNC: 4.5 MG/DL (ref 0.9–1.3)
EOSINOPHILS ABSOLUTE: 0.6 K/UL (ref 0–0.6)
EOSINOPHILS RELATIVE PERCENT: 6.5 %
GFR SERPL CREATININE-BSD FRML MDRD: 16 ML/MIN/{1.73_M2}
GLUCOSE BLD-MCNC: 105 MG/DL (ref 70–99)
HBV SURFACE AB TITR SER: 441.3 MIU/ML
HCT VFR BLD CALC: 31.3 % (ref 40.5–52.5)
HEMOGLOBIN: 10.4 G/DL (ref 13.5–17.5)
HEPATITIS B SURFACE ANTIGEN INTERPRETATION: NORMAL
LIPASE: 56 U/L (ref 13–60)
LYMPHOCYTES ABSOLUTE: 2.1 K/UL (ref 1–5.1)
LYMPHOCYTES RELATIVE PERCENT: 22.8 %
MCH RBC QN AUTO: 28.2 PG (ref 26–34)
MCHC RBC AUTO-ENTMCNC: 33.3 G/DL (ref 31–36)
MCV RBC AUTO: 84.7 FL (ref 80–100)
MONOCYTES ABSOLUTE: 0.9 K/UL (ref 0–1.3)
MONOCYTES RELATIVE PERCENT: 10.1 %
NEUTROPHILS ABSOLUTE: 5.6 K/UL (ref 1.7–7.7)
NEUTROPHILS RELATIVE PERCENT: 59.7 %
PDW BLD-RTO: 15.3 % (ref 12.4–15.4)
PLATELET # BLD: 275 K/UL (ref 135–450)
PMV BLD AUTO: 6.4 FL (ref 5–10.5)
POTASSIUM REFLEX MAGNESIUM: 4.5 MMOL/L (ref 3.5–5.1)
RBC # BLD: 3.7 M/UL (ref 4.2–5.9)
SODIUM BLD-SCNC: 134 MMOL/L (ref 136–145)
TOTAL PROTEIN: 6.8 G/DL (ref 6.4–8.2)
WBC # BLD: 9.4 K/UL (ref 4–11)

## 2022-10-23 PROCEDURE — 6370000000 HC RX 637 (ALT 250 FOR IP): Performed by: INTERNAL MEDICINE

## 2022-10-23 PROCEDURE — 6370000000 HC RX 637 (ALT 250 FOR IP): Performed by: NURSE PRACTITIONER

## 2022-10-23 PROCEDURE — 6360000002 HC RX W HCPCS: Performed by: INTERNAL MEDICINE

## 2022-10-23 PROCEDURE — 96376 TX/PRO/DX INJ SAME DRUG ADON: CPT

## 2022-10-23 PROCEDURE — 36415 COLL VENOUS BLD VENIPUNCTURE: CPT

## 2022-10-23 PROCEDURE — 83690 ASSAY OF LIPASE: CPT

## 2022-10-23 PROCEDURE — 2580000003 HC RX 258: Performed by: INTERNAL MEDICINE

## 2022-10-23 PROCEDURE — G0378 HOSPITAL OBSERVATION PER HR: HCPCS

## 2022-10-23 PROCEDURE — 85025 COMPLETE CBC W/AUTO DIFF WBC: CPT

## 2022-10-23 PROCEDURE — 80053 COMPREHEN METABOLIC PANEL: CPT

## 2022-10-23 PROCEDURE — 96372 THER/PROPH/DIAG INJ SC/IM: CPT

## 2022-10-23 RX ORDER — CALCIUM CARBONATE 200(500)MG
500 TABLET,CHEWABLE ORAL 3 TIMES DAILY PRN
Status: DISCONTINUED | OUTPATIENT
Start: 2022-10-23 | End: 2022-10-24 | Stop reason: HOSPADM

## 2022-10-23 RX ORDER — LOSARTAN POTASSIUM 25 MG/1
50 TABLET ORAL NIGHTLY
Status: DISCONTINUED | OUTPATIENT
Start: 2022-10-23 | End: 2022-10-24 | Stop reason: HOSPADM

## 2022-10-23 RX ORDER — CHOLECALCIFEROL (VITAMIN D3) 10 MCG
1 TABLET ORAL DAILY
Status: DISCONTINUED | OUTPATIENT
Start: 2022-10-23 | End: 2022-10-24 | Stop reason: HOSPADM

## 2022-10-23 RX ORDER — NIFEDIPINE 30 MG/1
60 TABLET, EXTENDED RELEASE ORAL DAILY
Status: DISCONTINUED | OUTPATIENT
Start: 2022-10-24 | End: 2022-10-24 | Stop reason: HOSPADM

## 2022-10-23 RX ORDER — METOPROLOL TARTRATE 50 MG/1
50 TABLET, FILM COATED ORAL 2 TIMES DAILY
Status: DISCONTINUED | OUTPATIENT
Start: 2022-10-23 | End: 2022-10-24 | Stop reason: HOSPADM

## 2022-10-23 RX ADMIN — HYDROMORPHONE HYDROCHLORIDE 1 MG: 1 INJECTION, SOLUTION INTRAMUSCULAR; INTRAVENOUS; SUBCUTANEOUS at 12:07

## 2022-10-23 RX ADMIN — HEPARIN SODIUM 5000 UNITS: 5000 INJECTION INTRAVENOUS; SUBCUTANEOUS at 14:50

## 2022-10-23 RX ADMIN — OXYCODONE 10 MG: 5 TABLET ORAL at 05:11

## 2022-10-23 RX ADMIN — HEPARIN SODIUM 5000 UNITS: 5000 INJECTION INTRAVENOUS; SUBCUTANEOUS at 05:12

## 2022-10-23 RX ADMIN — Medication 10 ML: at 22:14

## 2022-10-23 RX ADMIN — OXYCODONE 10 MG: 5 TABLET ORAL at 10:25

## 2022-10-23 RX ADMIN — NEPHROCAP 1 MG: 1 CAP ORAL at 12:11

## 2022-10-23 RX ADMIN — METOPROLOL TARTRATE 50 MG: 50 TABLET ORAL at 22:14

## 2022-10-23 RX ADMIN — HYDROMORPHONE HYDROCHLORIDE 1 MG: 1 INJECTION, SOLUTION INTRAMUSCULAR; INTRAVENOUS; SUBCUTANEOUS at 18:05

## 2022-10-23 RX ADMIN — Medication 10 ML: at 09:28

## 2022-10-23 RX ADMIN — ACETAMINOPHEN 650 MG: 325 TABLET ORAL at 22:13

## 2022-10-23 RX ADMIN — OXYCODONE 10 MG: 5 TABLET ORAL at 14:49

## 2022-10-23 RX ADMIN — ACETAMINOPHEN 650 MG: 325 TABLET ORAL at 09:27

## 2022-10-23 RX ADMIN — PANTOPRAZOLE SODIUM 40 MG: 40 TABLET, DELAYED RELEASE ORAL at 05:11

## 2022-10-23 RX ADMIN — OXYCODONE 10 MG: 5 TABLET ORAL at 22:14

## 2022-10-23 RX ADMIN — METOPROLOL TARTRATE 50 MG: 50 TABLET ORAL at 12:11

## 2022-10-23 RX ADMIN — ANTACID TABLETS 500 MG: 500 TABLET, CHEWABLE ORAL at 22:23

## 2022-10-23 RX ADMIN — HEPARIN SODIUM 5000 UNITS: 5000 INJECTION INTRAVENOUS; SUBCUTANEOUS at 22:15

## 2022-10-23 RX ADMIN — POLYETHYLENE GLYCOL 3350 17 G: 17 POWDER, FOR SOLUTION ORAL at 10:26

## 2022-10-23 RX ADMIN — CLONIDINE HYDROCHLORIDE 0.3 MG: 0.1 TABLET ORAL at 09:27

## 2022-10-23 RX ADMIN — LOSARTAN POTASSIUM 50 MG: 25 TABLET, FILM COATED ORAL at 22:13

## 2022-10-23 ASSESSMENT — PAIN DESCRIPTION - LOCATION
LOCATION: ABDOMEN

## 2022-10-23 ASSESSMENT — PAIN DESCRIPTION - ORIENTATION
ORIENTATION: RIGHT

## 2022-10-23 ASSESSMENT — PAIN SCALES - GENERAL
PAINLEVEL_OUTOF10: 0
PAINLEVEL_OUTOF10: 7
PAINLEVEL_OUTOF10: 6
PAINLEVEL_OUTOF10: 9

## 2022-10-23 ASSESSMENT — PAIN SCALES - WONG BAKER

## 2022-10-23 ASSESSMENT — PAIN DESCRIPTION - DESCRIPTORS
DESCRIPTORS: ACHING
DESCRIPTORS: ACHING;BURNING

## 2022-10-23 ASSESSMENT — PAIN - FUNCTIONAL ASSESSMENT
PAIN_FUNCTIONAL_ASSESSMENT: PREVENTS OR INTERFERES SOME ACTIVE ACTIVITIES AND ADLS
PAIN_FUNCTIONAL_ASSESSMENT: PREVENTS OR INTERFERES SOME ACTIVE ACTIVITIES AND ADLS

## 2022-10-23 NOTE — PROGRESS NOTES
Shift assessment completed. Pt A&O, /91 all other VSS on RA. Pt c/o 7/10 abdominal pain, PRN medications given per MAR. Denies any further needs at this time. Bed locked and in lowest position. Call light & bedside table are within reach.

## 2022-10-23 NOTE — PROGRESS NOTES
Pt AOx4, VSS with HTN, shift assessment completed and charted. Pt with HTN at beginning of shift, scheduled PRN PO analgesic, clonidine, and procardia XL administered. Pt remained hypertensive afterwards, NP notified, see previous note. Pt did not meet order parameters for PRN IVP labetalol. Last /100 x2, NP notified, no new orders. Pt resting comfortably in bed, and is ambulating independently, and c/o appropriately. Urinal at bedside. Pt c/o ABD pain after eating intermittently. Pt denying further needs, and was in stable when this RN left the room. Bed is low, locked, and call light/bedside table within reach. All care per orders, will continue to assess as appropriate.  Electronically signed by Ermelinad Bradford RN on 10/23/2022 at 4:06 AM

## 2022-10-23 NOTE — PROGRESS NOTES
Department of Internal Medicine  Nephrology Progress Note        SUBJECTIVE:    We are following this patient for ESRD management. The patient was seen and examined; he was resting comfortably today with no acute events noted overnight. ROS: No fever or chills. Social: No family at bedside. Physical Exam:    VITALS:  BP (!) 141/94   Pulse 82   Temp 98.3 °F (36.8 °C) (Oral)   Resp 16   Ht 6' (1.829 m)   Wt 222 lb 7.1 oz (100.9 kg)   SpO2 96%   BMI 30.17 kg/m²     General appearance: Seems comfortable, no acute distress. Neck: Trachea midline, thyroid normal.   Lungs:  Non labored breathing, CTA to anterior auscultation. Heart:  S1S2 normal, rub or gallop. No peripheral edema. Abdomen: Soft, non-tender, no organomegaly. Skin: No lesions or rashes, warm to touch.      DATA:    CBC with Differential:    Lab Results   Component Value Date/Time    WBC 9.4 10/23/2022 05:25 AM    RBC 3.70 10/23/2022 05:25 AM    HGB 10.4 10/23/2022 05:25 AM    HCT 31.3 10/23/2022 05:25 AM     10/23/2022 05:25 AM    MCV 84.7 10/23/2022 05:25 AM    MCH 28.2 10/23/2022 05:25 AM    MCHC 33.3 10/23/2022 05:25 AM    RDW 15.3 10/23/2022 05:25 AM    SEGSPCT 63.0 05/02/2013 11:45 AM    LYMPHOPCT 22.8 10/23/2022 05:25 AM    MONOPCT 10.1 10/23/2022 05:25 AM    EOSPCT 0.0 12/19/2009 10:29 PM    BASOPCT 0.9 10/23/2022 05:25 AM    MONOSABS 0.9 10/23/2022 05:25 AM    LYMPHSABS 2.1 10/23/2022 05:25 AM    EOSABS 0.6 10/23/2022 05:25 AM    BASOSABS 0.1 10/23/2022 05:25 AM    DIFFTYPE Auto 05/02/2013 11:45 AM     BMP:    Lab Results   Component Value Date/Time     10/23/2022 05:25 AM    K 4.5 10/23/2022 05:25 AM    CL 97 10/23/2022 05:25 AM    CO2 26 10/23/2022 05:25 AM    BUN 41 10/23/2022 05:25 AM    LABALBU 3.9 10/23/2022 05:25 AM    CREATININE 4.5 10/23/2022 05:25 AM    CALCIUM 8.9 10/23/2022 05:25 AM    GFRAA 14 10/16/2022 08:54 PM    GFRAA >60 05/02/2013 11:45 AM    LABGLOM 16 10/23/2022 05:25 AM    GLUCOSE 105 10/23/2022 05:25 AM       IMPRESSION/RECOMMENDATIONS:      1- ESRD: We will continue hemodialysis per [facility] schedule. 2- No significant electrolytes disorders noted. 3- HTN: Blood pressure remains elevated, will adjust antihypertensives. 4- Anemia: Stable hemoglobin at target for ESRD, monitor.   5- Abdominal pain: Management per primary team.

## 2022-10-23 NOTE — PROGRESS NOTES
Hospitalist Progress Note      PCP: Rachel Arias DO    Date of Admission: 10/22/2022    Chief Complaint: Abdominal pain     Hospital Course:   40 y.o. male who presented to [facility] with Abdominal pain. Medical history of ESRD on HD TTS and polycystic kidney disease with severely enlarged bilateral kidneys secheduled for bilateral nephrectomy later this year with renal transplant. He developed worsening right abdominal discomfort. Repeat CT did not show pancreatitis or acute abdominopelvic abnormalities but did show several foci in right kidney that have increased in size. Lipase slightly elevated at 90. Also developed headache with HTN after dialysis. He had midodrine prior to arrival while he had uncontrolled HTN. Subjective: Patient had chick victor m a last night but unable to eat much due to worsening pain. He also developed a headache when his SBP was 180    Today patient with right abdominal pain. He has not had a bowel movement.  Headache has much improved with BP control       Medications:  Reviewed    Infusion Medications    sodium chloride       Scheduled Medications    sodium chloride flush  5-40 mL IntraVENous 2 times per day    heparin (porcine)  5,000 Units SubCUTAneous 3 times per day    cloNIDine  0.3 mg Oral BID    NIFEdipine  60 mg Oral Nightly    polyethylene glycol  17 g Oral Daily    pantoprazole  40 mg Oral QAM AC     PRN Meds: HYDROmorphone, sodium chloride flush, sodium chloride, polyethylene glycol, acetaminophen **OR** acetaminophen, promethazine **OR** ondansetron, oxyCODONE **OR** oxyCODONE, heparin (porcine), labetalol      Intake/Output Summary (Last 24 hours) at 10/23/2022 0959  Last data filed at 10/22/2022 1935  Gross per 24 hour   Intake 120 ml   Output --   Net 120 ml       Physical Exam Performed:    BP (!) 141/94   Pulse 82   Temp 98.3 °F (36.8 °C) (Oral)   Resp 16   Ht 6' (1.829 m)   Wt 222 lb 7.1 oz (100.9 kg)   SpO2 96%   BMI 30.17 kg/m² General appearance:  No apparent distress, appears stated age and cooperative. HEENT:  Normal cephalic, atraumatic without obvious deformity. Pupils equal, round, and reactive to light. Extra ocular muscles intact. Conjunctivae/corneas clear. Neck: Supple, with full range of motion. No jugular venous distention. Trachea midline. Respiratory:  Normal respiratory effort. Clear to auscultation, bilaterally without Rales/Wheezes/Rhonchi. Cardiovascular:  Regular rate and rhythm with normal S1/S2 without murmurs, rubs or gallops. Abdomen: Very distended. Masses can be felt throughout entire abdomen. Hyperactive bowel sounds. Musculoskeletal:  No clubbing, cyanosis or edema bilaterally. Full range of motion without deformity. Skin: Skin color, texture, turgor normal.  No rashes or lesions. Neurologic:  Neurovascularly intact without any focal sensory/motor deficits. Cranial nerves: II-XII intact, grossly non-focal.  Psychiatric:  Alert and oriented, thought content appropriate, normal insight  Capillary Refill: Brisk,3 seconds, normal  Peripheral Pulses: +2 palpable, equal bilaterally       Labs:   Recent Labs     10/22/22  0746 10/23/22  0525   WBC 12.2* 9.4   HGB 11.2* 10.4*   HCT 34.1* 31.3*    275     Recent Labs     10/22/22  0746 10/23/22  0525    134*   K 4.0 4.5    97*   CO2 23 26   BUN 63* 41*   CREATININE 5.0* 4.5*   CALCIUM 9.7 8.9     Recent Labs     10/22/22  0746 10/23/22  0525   AST 14* 12*   ALT 22 18   BILITOT <0.2 <0.2   ALKPHOS 74 68     No results for input(s): INR in the last 72 hours. No results for input(s): Mable Rowels in the last 72 hours.     Urinalysis:      Lab Results   Component Value Date/Time    NITRU Negative 10/22/2022 07:55 AM    WBCUA 3-5 10/22/2022 07:55 AM    BACTERIA Rare 10/16/2022 10:15 PM    RBCUA 5-10 10/22/2022 07:55 AM    BLOODU SMALL 10/22/2022 07:55 AM    SPECGRAV 1.020 10/22/2022 07:55 AM    GLUCOSEU 100 10/22/2022 07:55 AM GLUCOSEU NEGATIVE 09/21/2011 10:18 AM       Radiology:  CT ABDOMEN PELVIS WO CONTRAST Additional Contrast? None   Final Result   No acute abdominopelvic abnormality is identified. Polycystic kidney disease is again noted. There are areas of enhancement and   with kidneys, incompletely characterized, favored to represent residual   parenchyma. Several foci in the right kidney are significantly changed in   the interval.  Further characterization by MRI with gadolinium is   recommended, when feasible. Assessment/Plan:    Active Hospital Problems    Diagnosis     Abdominal pain [R10.9]      Abdominal pain   Lipase is slightly elevated but not 3x upper limit of normal, pancreas unremarkable and not classic pancreatitis pain. As patient has ESRD hesitant to treat with aggressive IVF. Will recheck lipase this morning and if not remarkable, will treat likely constipation   Reviewed CT personally and patient with massive kidneys that take up entire abdomen and bowels are with stool and compressed from kidneys. Increase miralax regimen if continued abdominal pain if workup above unremarkable   Patient now agreeable to miralax daily to keep bowels soft  Full liquid diet - may need to continue soft diet outpatient until nephrectomy completed   Oxycodone PRN and dilaudid for breakthrough pain     Hypertensive urgency, improved  Persistent HTN after dailysis, patient took midodrine this AM   Labetalol 1x   Labetalol PRN and start home clonidine and nifedipine tonight   May consider changing midodrine outpatient to as needed      ESRD - continue TTS treatment   Polycystic kidney disease  Nephrology consulted - thank you  If lipase significantly elevated, will discuss with nephro about aggressive fluids. He appears euvolemic and HCT decreasing so dont believe IVF would be necessary if pancreatitis     Hx of brain aneurysm s/p surgery       DVT Prophylaxis: Heparin   Diet: ADULT DIET; Full Liquid;  Low Potassium (Less than 3000 mg/day);  Low Phosphorus (Less than 1000 mg)  Code Status: Full Code  PT/OT Eval Status: Not indicated    Dispo - Pending pain control     Appropriate for A1 Discharge Unit: No      Blair Syed, DO

## 2022-10-24 VITALS
OXYGEN SATURATION: 98 % | DIASTOLIC BLOOD PRESSURE: 104 MMHG | HEIGHT: 72 IN | HEART RATE: 83 BPM | BODY MASS INDEX: 30.13 KG/M2 | WEIGHT: 222.44 LBS | TEMPERATURE: 97.8 F | SYSTOLIC BLOOD PRESSURE: 162 MMHG | RESPIRATION RATE: 16 BRPM

## 2022-10-24 LAB
A/G RATIO: 1.4 (ref 1.1–2.2)
ALBUMIN SERPL-MCNC: 4.1 G/DL (ref 3.4–5)
ALP BLD-CCNC: 77 U/L (ref 40–129)
ALT SERPL-CCNC: 17 U/L (ref 10–40)
ANION GAP SERPL CALCULATED.3IONS-SCNC: 15 MMOL/L (ref 3–16)
AST SERPL-CCNC: 12 U/L (ref 15–37)
BILIRUB SERPL-MCNC: <0.2 MG/DL (ref 0–1)
BUN BLDV-MCNC: 49 MG/DL (ref 7–20)
CALCIUM SERPL-MCNC: 9.8 MG/DL (ref 8.3–10.6)
CHLORIDE BLD-SCNC: 97 MMOL/L (ref 99–110)
CO2: 25 MMOL/L (ref 21–32)
CREAT SERPL-MCNC: 4.8 MG/DL (ref 0.9–1.3)
GFR SERPL CREATININE-BSD FRML MDRD: 15 ML/MIN/{1.73_M2}
GLUCOSE BLD-MCNC: 95 MG/DL (ref 70–99)
POTASSIUM SERPL-SCNC: 4.5 MMOL/L (ref 3.5–5.1)
SODIUM BLD-SCNC: 137 MMOL/L (ref 136–145)
TOTAL PROTEIN: 7.1 G/DL (ref 6.4–8.2)

## 2022-10-24 PROCEDURE — 6370000000 HC RX 637 (ALT 250 FOR IP): Performed by: NURSE PRACTITIONER

## 2022-10-24 PROCEDURE — 96372 THER/PROPH/DIAG INJ SC/IM: CPT

## 2022-10-24 PROCEDURE — 80053 COMPREHEN METABOLIC PANEL: CPT

## 2022-10-24 PROCEDURE — 96376 TX/PRO/DX INJ SAME DRUG ADON: CPT

## 2022-10-24 PROCEDURE — G0378 HOSPITAL OBSERVATION PER HR: HCPCS

## 2022-10-24 PROCEDURE — 36415 COLL VENOUS BLD VENIPUNCTURE: CPT

## 2022-10-24 PROCEDURE — 2500000003 HC RX 250 WO HCPCS: Performed by: INTERNAL MEDICINE

## 2022-10-24 PROCEDURE — 6370000000 HC RX 637 (ALT 250 FOR IP): Performed by: INTERNAL MEDICINE

## 2022-10-24 PROCEDURE — 2580000003 HC RX 258: Performed by: INTERNAL MEDICINE

## 2022-10-24 PROCEDURE — 6360000002 HC RX W HCPCS: Performed by: INTERNAL MEDICINE

## 2022-10-24 RX ORDER — OXYCODONE HYDROCHLORIDE 5 MG/1
5 TABLET ORAL EVERY 8 HOURS PRN
Qty: 10 TABLET | Refills: 0 | Status: SHIPPED | OUTPATIENT
Start: 2022-10-24 | End: 2022-10-27

## 2022-10-24 RX ORDER — LOSARTAN POTASSIUM 50 MG/1
50 TABLET ORAL NIGHTLY
Qty: 30 TABLET | Refills: 3 | Status: SHIPPED | OUTPATIENT
Start: 2022-10-24

## 2022-10-24 RX ORDER — NIFEDIPINE 30 MG/1
60 TABLET, EXTENDED RELEASE ORAL DAILY
Qty: 30 TABLET | Refills: 3 | Status: SHIPPED | OUTPATIENT
Start: 2022-10-24

## 2022-10-24 RX ORDER — POLYETHYLENE GLYCOL 3350 17 G/17G
17 POWDER, FOR SOLUTION ORAL DAILY
Qty: 527 G | Refills: 1 | Status: SHIPPED | OUTPATIENT
Start: 2022-10-25 | End: 2022-11-24

## 2022-10-24 RX ORDER — METOPROLOL TARTRATE 50 MG/1
50 TABLET, FILM COATED ORAL 2 TIMES DAILY
Qty: 60 TABLET | Refills: 3 | Status: SHIPPED | OUTPATIENT
Start: 2022-10-24

## 2022-10-24 RX ADMIN — Medication 10 ML: at 09:14

## 2022-10-24 RX ADMIN — OXYCODONE 5 MG: 5 TABLET ORAL at 09:11

## 2022-10-24 RX ADMIN — LABETALOL HYDROCHLORIDE 10 MG: 5 INJECTION INTRAVENOUS at 00:33

## 2022-10-24 RX ADMIN — HEPARIN SODIUM 5000 UNITS: 5000 INJECTION INTRAVENOUS; SUBCUTANEOUS at 06:13

## 2022-10-24 RX ADMIN — PANTOPRAZOLE SODIUM 40 MG: 40 TABLET, DELAYED RELEASE ORAL at 06:13

## 2022-10-24 RX ADMIN — ANTACID TABLETS 500 MG: 500 TABLET, CHEWABLE ORAL at 09:09

## 2022-10-24 RX ADMIN — HYDROMORPHONE HYDROCHLORIDE 1 MG: 1 INJECTION, SOLUTION INTRAMUSCULAR; INTRAVENOUS; SUBCUTANEOUS at 00:30

## 2022-10-24 RX ADMIN — OXYCODONE 5 MG: 5 TABLET ORAL at 04:31

## 2022-10-24 RX ADMIN — METOPROLOL TARTRATE 50 MG: 50 TABLET ORAL at 09:09

## 2022-10-24 RX ADMIN — ACETAMINOPHEN 650 MG: 325 TABLET ORAL at 09:11

## 2022-10-24 RX ADMIN — NEPHROCAP 1 MG: 1 CAP ORAL at 09:11

## 2022-10-24 RX ADMIN — POLYETHYLENE GLYCOL 3350 17 G: 17 POWDER, FOR SOLUTION ORAL at 09:12

## 2022-10-24 ASSESSMENT — PAIN DESCRIPTION - ORIENTATION
ORIENTATION: RIGHT
ORIENTATION: RIGHT

## 2022-10-24 ASSESSMENT — PAIN DESCRIPTION - LOCATION
LOCATION: RIB CAGE;SHOULDER
LOCATION: ABDOMEN;SHOULDER

## 2022-10-24 ASSESSMENT — PAIN SCALES - WONG BAKER
WONGBAKER_NUMERICALRESPONSE: 0

## 2022-10-24 ASSESSMENT — PAIN - FUNCTIONAL ASSESSMENT: PAIN_FUNCTIONAL_ASSESSMENT: ACTIVITIES ARE NOT PREVENTED

## 2022-10-24 ASSESSMENT — PAIN SCALES - GENERAL
PAINLEVEL_OUTOF10: 5
PAINLEVEL_OUTOF10: 3
PAINLEVEL_OUTOF10: 5

## 2022-10-24 ASSESSMENT — PAIN DESCRIPTION - DESCRIPTORS
DESCRIPTORS: ACHING;DISCOMFORT
DESCRIPTORS: ACHING

## 2022-10-24 NOTE — PROGRESS NOTES
Shift assessment completed. Pt A&O, /104 all other VSS on RA. Pt c/o 5/10 abdominal pain, PRN medications given per MAR. Denies any further needs at this time. Bed locked and in lowest position. Call light & bedside table are within reach.

## 2022-10-24 NOTE — PROGRESS NOTES
[facility] Progress Note           Subjective/   40y.o. year old male who we are seeing in consultation for ESKD on HD. HPI:  Last HD on 10/22 with 958 ml removed, post-weight of 100.8 kg. States abdominal pain better. ROS:  Tolerating diet, no fevers. Objective/   GEN:  Chronically ill, BP (!) 162/104   Pulse 83   Temp 97.8 °F (36.6 °C) (Oral)   Resp 16   Ht 6' (1.829 m)   Wt 222 lb 7.1 oz (100.9 kg)   SpO2 98%   BMI 30.17 kg/m²   HEENT: non-icteric, no JVD  CV: S1, S2 without m/r/g; no LE edema  RESP: CTA B without w/r/r; breathing wnl  ABD: +bs, soft, nt, no hsm  SKIN: warm, no rashes  ACCESS: Right [facility]    Data/  Recent Labs     10/22/22  0746 10/23/22  0525   WBC 12.2* 9.4   HGB 11.2* 10.4*   HCT 34.1* 31.3*   MCV 84.5 84.7    275     Recent Labs     10/22/22  0746 10/23/22  0525 10/24/22  0619    134* 137   K 4.0 4.5 4.5    97* 97*   CO2 23 26 25   GLUCOSE 111* 105* 95   BUN 63* 41* 49*   CREATININE 5.0* 4.5* 4.8*   LABGLOM 14* 16* 15*       Assessment/     - ESKD: Continue hemodialysis per [facility] schedule. Outpatient .5 kg.    - Hypertension: Blood pressure remains elevated, better with addition of metoprolol.    - Anemia: Stable hemoglobin at target for ESRD, monitor.    - Abdominal pain: Better, plans per Admitting. Okay for discharge  Case d/w Admitting team    ____________________________________  Herminia Rivas MD  [facility]  [email or web address]  Office: [phone]

## 2022-10-24 NOTE — PROGRESS NOTES
Perfect serve Dr. Vince Swan regarding elevated /103. PRN labetolol has been administered, and parameters not met for administration. Pt resting in bed asymptomatic at this time. Awaiting response. Per Dr. Vince Swan, continue to monitor until/unless SBP >180.       Electronically signed by Praveen King RN on 10/24/2022 at 6:01 AM

## 2022-10-24 NOTE — DISCHARGE SUMMARY
Hospital Medicine Discharge Summary    Patient ID: [address]      Patient's PCP: Rhonda Nichols DO    Admit Date: 10/22/2022     Discharge Date: 10/24/2022    Admitting Provider: Collins England DO     Discharge Provider: Shannon Brantley MD     Discharge Diagnoses: Active Hospital Problems    Diagnosis     Abdominal pain [R10.9]        The patient was seen and examined on day of discharge and this discharge summary is in conjunction with any daily progress note from day of discharge. Hospital Course: 40 y.o. male who presented to [facility] with Abdominal pain. Medical history of ESRD on HD TTS and polycystic kidney disease with severely enlarged bilateral kidneys secheduled for bilateral nephrectomy later this year with renal transplant. He developed worsening right abdominal discomfort. Repeat CT did not show pancreatitis or acute abdominopelvic abnormalities but did show several foci in right kidney that have increased in size. Lipase slightly elevated at 90. Also developed headache with HTN after dialysis. He had midodrine prior to arrival while he had uncontrolled HTN. By Problem List    Abdominal pain   Lipase is slightly elevated but not 3x upper limit of normal, pancreas unremarkable and not classic pancreatitis pain. Likely due to constipation as well   Reviewed CT personally and patient with massive kidneys that take up entire abdomen and bowels are with stool and compressed from kidneys.    Increase miralax regimen if continued abdominal pain if workup above unremarkable   Patient received  miralax daily to keep bowels soft  Full liquid diet - may need to continue soft diet outpatient until nephrectomy completed   Oxycodone PRN and dilaudid for breakthrough pain     Hypertensive urgency, improved  Persistent HTN after dailysis, patient took midodrine this AM   Labetalol 1x   Labetalol PRN and start home clonidine and nifedipine tonight   May consider changing midodrine outpatient to as needed      ESRD - continue TTS treatment   Polycystic kidney disease  Nephrology consulted - thank you  If lipase significantly elevated, will discuss with nephro about aggressive fluids. He appears euvolemic and HCT decreasing so dont believe IVF would be necessary if pancreatitis      Hx of brain aneurysm s/p surgery       Patient clinically improved and discharged home in stable medical condition           Physical Exam Performed:   BP (!) 162/104   Pulse 83   Temp 97.8 °F (36.6 °C) (Oral)   Resp 16   Ht 6' (1.829 m)   Wt 222 lb 7.1 oz (100.9 kg)   SpO2 98%   BMI 30.17 kg/m²     General appearance:  No apparent distress, appears stated age and cooperative. HEENT:  Normal cephalic, atraumatic without obvious deformity. Pupils equal, round, and reactive to light. Extra ocular muscles intact. Conjunctivae/corneas clear. Neck: Supple, with full range of motion. No jugular venous distention. Trachea midline. Respiratory:  Normal respiratory effort. Clear to auscultation, bilaterally without Rales/Wheezes/Rhonchi. Cardiovascular:  Regular rate and rhythm with normal S1/S2 without murmurs, rubs or gallops. Abdomen: Very distended. Masses can be felt throughout entire abdomen. Hyperactive bowel sounds. Musculoskeletal:  No clubbing, cyanosis or edema bilaterally. Full range of motion without deformity. Skin: Skin color, texture, turgor normal.  No rashes or lesions. Neurologic:  Neurovascularly intact without any focal sensory/motor deficits. Cranial nerves: II-XII intact, grossly non-focal.  Psychiatric:  Alert and oriented, thought content appropriate, normal insight  Capillary Refill: Brisk,3 seconds, normal  Peripheral Pulses: +2 palpable, equal bilaterally      Labs:  For convenience and continuity at follow-up the following most recent labs are provided:      CBC:    Lab Results   Component Value Date/Time    WBC 9.4 10/23/2022 05:25 AM    HGB 10.4 10/23/2022 05:25 AM HCT 31.3 10/23/2022 05:25 AM     10/23/2022 05:25 AM       Renal:    Lab Results   Component Value Date/Time     10/24/2022 06:19 AM    K 4.5 10/24/2022 06:19 AM    K 4.5 10/23/2022 05:25 AM    CL 97 10/24/2022 06:19 AM    CO2 25 10/24/2022 06:19 AM    BUN 49 10/24/2022 06:19 AM    CREATININE 4.8 10/24/2022 06:19 AM    CALCIUM 9.8 10/24/2022 06:19 AM    PHOS 3.9 01/25/2021 12:54 PM         Significant Diagnostic Studies    Radiology:   CT ABDOMEN PELVIS WO CONTRAST Additional Contrast? None   Final Result   No acute abdominopelvic abnormality is identified. Polycystic kidney disease is again noted. There are areas of enhancement and   with kidneys, incompletely characterized, favored to represent residual   parenchyma. Several foci in the right kidney are significantly changed in   the interval.  Further characterization by MRI with gadolinium is   recommended, when feasible. Consults:   IP CONSULT TO HOSPITALIST  IP CONSULT TO HOSPITALIST    Disposition: Home     Condition at Discharge: Stable    Discharge Instructions/Follow-up:  F/up with PCP in 1 week at [place]     Code Status:  Full Code    Activity: activity as tolerated    Diet: renal diet      Discharge Medications:   Discharge Medication List as of 10/24/2022 12:56 PM             Details   metoprolol tartrate (LOPRESSOR) 50 MG tablet Take 1 tablet by mouth 2 times daily, Disp-60 tablet, R-3Normal      polyethylene glycol (GLYCOLAX) 17 g packet Take 17 g by mouth daily, Disp-527 g, R-1Normal      losartan (COZAAR) 50 MG tablet Take 1 tablet by mouth nightly, Disp-30 tablet, R-3Normal                Details   oxyCODONE (ROXICODONE) 5 MG immediate release tablet Take 1 tablet by mouth every 8 hours as needed for Pain for up to 3 days. , Disp-10 tablet, R-0Print      NIFEdipine (PROCARDIA XL) 30 MG extended release tablet Take 2 tablets by mouth daily, Disp-30 tablet, R-3Normal                Details   Vitamin D, Cholecalciferol, 25 MCG (1000 UT) CAPS Take 1,000 Units by mouth dailyHistorical Med      B Complex-C-Folic Acid (DIALYVITE TABLET) TABS TAKE 1 TABLET BY MOUTH EVERY DAYHistorical Med      cloNIDine (CATAPRES) 0.3 MG tablet Take 1 tablet by mouth 3 times daily, Disp-60 tablet, R-3Normal      citalopram (CELEXA) 10 MG tablet TAKE 1 TABLET BY MOUTH EVERY DAY, Disp-90 tablet, R-1Normal      simvastatin (ZOCOR) 40 MG tablet TAKE 1 TABLET BY MOUTH EVERY DAY AT NIGHT, Disp-90 tablet, R-1Normal             Time Spent on discharge is more than 30 minutes in the examination, evaluation, counseling and review of medications and discharge plan. Signed:    Elise Faustin MD   10/24/2022      Thank you Elidia Stephens DO for the opportunity to be involved in this patient's care. If you have any questions or concerns, please feel free to contact me at [phone].

## 2022-10-24 NOTE — CARE COORDINATION
CASE MANAGEMENT INITIAL ASSESSMENT      Reviewed chart and completed assessment with patient:bedside  Family present: none  Explained Case Management role/services. Primary contact information:[facility] Decision Maker :   Primary Decision Maker: Alberto Tatum - Parent - [phone]    Primary Decision Maker: Yaquelin Alan Child - [phone]    Secondary Decision Maker: Johny Carrillo - Domestic Partner - [phone]          Can this person be reached and be able to respond quickly, such as within a few minutes or hours? Yes      Admit date/status:10/22/22  Diagnosis:abd pain   Is this a Readmission?:  No      Insurance:BCBS   Precert required for SNF: Yes       3 night stay required: No    Living arrangements, Adls, care needs, prior to admission:lives in ranch style home alone    Durable Medical Equipment at home:  Walker__Cane__RTS__ BSC__Shower Chair__  02__ HHN__ CPAP__  BiPap__  Hospital Bed__ W/C___ Other_____    Services in the home and/or outpatient, prior to admission:none    Current PCP:Niki Matthews                                Medications Prescription coverage? yes none, drives  Transportation needs: none     Dialysis Facility (if applicable)   Name: [facility] in [place]  Address:  Dialysis Schedule:TTS, 6am  Phone:  Fax:    PT/OT recs:none    Hospital Exemption Notification (HEN):needed forSNF    Barriers to discharge:none    Plan/comments:spoke with patient. Plans on returning home at d/c. IPTA and drives. Will be able to get to any follow up appts. Denied any DCP needs. Currently on full liquid diet.      ECOC on chart for MD signature

## 2022-10-24 NOTE — PROGRESS NOTES
Pt's verbal and written discharge instructions given, all questions answered,. IV removed. Follow up appointments discussed. New medications reviewed. Pt left in stable condition via wheelchair.

## 2022-10-25 ENCOUNTER — CARE COORDINATION (OUTPATIENT)
Dept: CASE MANAGEMENT | Age: 42
End: 2022-10-25

## 2022-10-25 DIAGNOSIS — N17.9 ACUTE KIDNEY INJURY SUPERIMPOSED ON CKD (HCC): Primary | ICD-10-CM

## 2022-10-25 DIAGNOSIS — N18.9 ACUTE KIDNEY INJURY SUPERIMPOSED ON CKD (HCC): Primary | ICD-10-CM

## 2022-10-25 NOTE — CARE COORDINATION
[facility] Care Transitions Initial Follow Up Call    Call within 2 business days of discharge: Yes    Care Transition Nurse contacted the patient by telephone to perform post hospital discharge assessment. Verified name and  with patient as identifiers. Provided introduction to self, and explanation of the Care Transition Nurse role. Patient: Ofe Uribe Patient : 1980   MRN: [id number]  Reason for Admission: DAMASO   Discharge Date: 10/24/22 RARS: Readmission Risk Score: 18 ( )      Last Discharge  Street       Date Complaint Diagnosis Description Type Department Provider    10/22/22 Abdominal Pain; Flank Pain Renal mass . .. ED to Hosp-Admission (Discharged) (ADMITTED) Yris Broussard MD; Osiris Heredia . .. Was this an external facility discharge? No Discharge Facility: n.a    Challenges to be reviewed by the provider   Additional needs identified to be addressed with provider: No  none               Method of communication with provider: none. CTN spoke with patient who reported he is feeling much better. Patient reported he already had dialysis today and doing fine. CTN reviewed all medications with patient who reported he is taking all as prescribed. Patient reported he will [facility] and he no longer sees Dr. Sari Aaron. Patient denied any further needs at this time. Care Transition Nurse reviewed discharge instructions, medical action plan, and red flags with patient who verbalized understanding. The patient was given an opportunity to ask questions and does not have any further questions or concerns at this time. Were discharge instructions available to patient? Yes. Reviewed appropriate site of care based on symptoms and resources available to patient including: PCP  Specialist  When to call 911. The patient agrees to contact the PCP office for questions related to their healthcare.      Advance Care Planning:   Does patient have an Advance Directive: patient declined education. Medication reconciliation was performed with patient, who verbalizes understanding of administration of home medications. Medications reviewed, 1111F entered: yes    Was patient discharged with a pulse oximeter? no    Non-face-to-face services provided:  Obtained and reviewed discharge summary and/or continuity of care documents  Education of patient/family/caregiver/guardian to support self-management-. Offered patient enrollment in the Remote Patient Monitoring (RPM) program for in-home monitoring: Patient is not eligible for RPM program.    Care Transitions 24 Hour Call    Do you have a copy of your discharge instructions?: Yes  Do you have all of your prescriptions and are they filled?: Yes  Have you been contacted by a [facility] Pharmacist?: No  Have you scheduled your follow up appointment?: No  Do you have support at home?: Alone  Do you feel like you have everything you need to keep you well at home?: Yes  Are you an active caregiver in your home?: No  Care Transitions Interventions         Follow Up  No future appointments. Care Transition Nurse provided contact information. No further follow-up call indicated based on severity of symptoms and risk factors.     Uday ARRIOLAN, RN, [facility]  Care Transition Nurse  [phone] mobile

## 2023-02-16 ENCOUNTER — OFFICE VISIT (OUTPATIENT)
Dept: FAMILY MEDICINE CLINIC | Age: 43
End: 2023-02-16
Payer: COMMERCIAL

## 2023-02-16 VITALS
DIASTOLIC BLOOD PRESSURE: 75 MMHG | OXYGEN SATURATION: 99 % | HEART RATE: 62 BPM | RESPIRATION RATE: 16 BRPM | TEMPERATURE: 97.5 F | SYSTOLIC BLOOD PRESSURE: 125 MMHG | WEIGHT: 196.8 LBS | BODY MASS INDEX: 26.69 KG/M2

## 2023-02-16 DIAGNOSIS — R45.89 DEPRESSED MOOD: ICD-10-CM

## 2023-02-16 DIAGNOSIS — F90.2 ATTENTION DEFICIT HYPERACTIVITY DISORDER (ADHD), COMBINED TYPE: ICD-10-CM

## 2023-02-16 DIAGNOSIS — I10 UNCONTROLLED HYPERTENSION: ICD-10-CM

## 2023-02-16 DIAGNOSIS — E87.5 HYPERKALEMIA: ICD-10-CM

## 2023-02-16 DIAGNOSIS — K21.9 GASTROESOPHAGEAL REFLUX DISEASE WITHOUT ESOPHAGITIS: Primary | ICD-10-CM

## 2023-02-16 PROCEDURE — 3077F SYST BP >= 140 MM HG: CPT | Performed by: FAMILY MEDICINE

## 2023-02-16 PROCEDURE — 3080F DIAST BP >= 90 MM HG: CPT | Performed by: FAMILY MEDICINE

## 2023-02-16 PROCEDURE — 99214 OFFICE O/P EST MOD 30 MIN: CPT | Performed by: FAMILY MEDICINE

## 2023-02-16 RX ORDER — PANTOPRAZOLE SODIUM 40 MG/1
40 TABLET, DELAYED RELEASE ORAL DAILY
COMMUNITY
Start: 2023-01-25

## 2023-02-16 RX ORDER — SEVELAMER CARBONATE 800 MG/1
TABLET, FILM COATED ORAL
COMMUNITY
Start: 2022-10-18

## 2023-02-16 RX ORDER — NIFEDIPINE 60 MG/1
TABLET, EXTENDED RELEASE ORAL
COMMUNITY
Start: 2022-12-20

## 2023-02-16 RX ORDER — MIDODRINE HYDROCHLORIDE 5 MG/1
TABLET ORAL
COMMUNITY
Start: 2023-02-09

## 2023-02-16 RX ORDER — WARFARIN SODIUM 3 MG/1
TABLET ORAL
COMMUNITY
Start: 2023-02-13

## 2023-02-16 RX ORDER — ACETAMINOPHEN 500 MG
1000 TABLET ORAL EVERY 8 HOURS
COMMUNITY
Start: 2022-04-16

## 2023-02-16 RX ORDER — CALCITRIOL 0.5 UG/1
0.5 CAPSULE, LIQUID FILLED ORAL
COMMUNITY
Start: 2023-01-27

## 2023-02-16 RX ORDER — METHOCARBAMOL 1000 MG/1
1000 TABLET, COATED ORAL 4 TIMES DAILY PRN
COMMUNITY
Start: 2023-01-25

## 2023-02-16 SDOH — ECONOMIC STABILITY: FOOD INSECURITY: WITHIN THE PAST 12 MONTHS, THE FOOD YOU BOUGHT JUST DIDN'T LAST AND YOU DIDN'T HAVE MONEY TO GET MORE.: NEVER TRUE

## 2023-02-16 SDOH — ECONOMIC STABILITY: INCOME INSECURITY: HOW HARD IS IT FOR YOU TO PAY FOR THE VERY BASICS LIKE FOOD, HOUSING, MEDICAL CARE, AND HEATING?: NOT HARD AT ALL

## 2023-02-16 SDOH — ECONOMIC STABILITY: FOOD INSECURITY: WITHIN THE PAST 12 MONTHS, YOU WORRIED THAT YOUR FOOD WOULD RUN OUT BEFORE YOU GOT MONEY TO BUY MORE.: NEVER TRUE

## 2023-02-16 ASSESSMENT — PATIENT HEALTH QUESTIONNAIRE - PHQ9
SUM OF ALL RESPONSES TO PHQ QUESTIONS 1-9: 0
SUM OF ALL RESPONSES TO PHQ QUESTIONS 1-9: 0
SUM OF ALL RESPONSES TO PHQ9 QUESTIONS 1 & 2: 0
SUM OF ALL RESPONSES TO PHQ QUESTIONS 1-9: 0
SUM OF ALL RESPONSES TO PHQ QUESTIONS 1-9: 0
1. LITTLE INTEREST OR PLEASURE IN DOING THINGS: 0
2. FEELING DOWN, DEPRESSED OR HOPELESS: 0

## 2023-02-16 NOTE — PROGRESS NOTES
2/16/2023    This is a 40 y.o. male   Chief Complaint   Patient presents with    Other     Re establish care    . HPI  Patient presents today to reestablish care with me. Had nephrectomy 3 weeks ago at [facility], doing dialysis 3 days a week. Is on the kidney donor list for 2 years, goes active back on it.  will have port for dialysis placed on 03/09/23,  had brain aneurysm repaired last year. Sees Walter. Denies fatigue, has bilateral flank pain. Tension: Currently taking clonidine 0.3 mg twice daily, losartan 50 mg, and nifedipine 60 mg extended release, blood pressure 197/111 today. Home avg 's/70, feels great. Also still having issues with concentration. Denies issues with reflux, taking Pantoprazole 40 mg daily, mood has been good on Celexa 10 mg.     Past Medical History:   Diagnosis Date    ADD (attention deficit disorder with hyperactivity)     CKD (chronic kidney disease) stage 5, GFR less than 15 ml/min ([facility])     ADPCKD    Hyperlipidemia     Hypertension     PKD (polycystic kidney disease)        Past Surgical History:   Procedure Laterality Date    HERNIA REPAIR      OTHER SURGICAL HISTORY Bilateral 5/15/13    BILATERAL LAPAROSCOPIC PREPERITONEAL INGUINAL HERNIA REPAIR    UMBILICAL HERNIA REPAIR N/A 8/79/8042    OPEN UMBILICAL HERNIA REPAIR performed by Gabriel Ruiz MD at [address] [facility]  3/15/2011    VASECTOMY      WISDOM TOOTH EXTRACTION         Social History     Socioeconomic History    Marital status:      Spouse name: Not on file    Number of children: Not on file    Years of education: Not on file    Highest education level: Not on file   Occupational History    Not on file   Tobacco Use    Smoking status: Former     Packs/day: 0.25     Years: 26.00     Pack years: 6.50     Types: Cigarettes    Smokeless tobacco: Never    Tobacco comments:     1 pack per month   Vaping Use    Vaping Use: Never used   Substance and Sexual Activity    Alcohol use: Yes     Comment: rarely    Drug use: Not Currently    Sexual activity: Yes     Partners: Female   Other Topics Concern    Not on file   Social History Narrative    Not on file     Social Determinants of Health     Financial Resource Strain: Low Risk     Difficulty of Paying Living Expenses: Not hard at all   Food Insecurity: No Food Insecurity    Worried About [address] in the Last Year: Never true    [address] [employer] in the Last Year: Never true   Transportation Needs: Unknown    Lack of Transportation (Medical): Not on file    Lack of Transportation (Non-Medical): No   Physical Activity: Not on file   Stress: Not on file   Social Connections: Not on file   Intimate Partner Violence: Not on file   Housing Stability: Unknown    Unable to Pay for Housing in the Last Year: Not on file    Number of Places Lived in the Last Year: Not on file    Unstable Housing in the Last Year: No       Family History   Problem Relation Age of Onset    Kidney Disease Mother     Heart Disease Mother         CHF    High Blood Pressure Sister     High Cholesterol Sister     Other Sister         PKD    High Blood Pressure Brother     High Cholesterol Brother     Other Brother         PKD       Current Outpatient Medications   Medication Sig Dispense Refill    warfarin (COUMADIN) 3 MG tablet       sevelamer (RENVELA) 800 MG tablet TAKE 1 TABLET BY MOUTH THREE TIMES A DAY WITH MEALS      pantoprazole (PROTONIX) 40 MG tablet Take 40 mg by mouth daily      NIFEdipine (PROCARDIA XL) 60 MG extended release tablet TAKE 1 TABLET BY MOUTH EVERY DAY      midodrine (PROAMATINE) 5 MG tablet       Methocarbamol 1000 MG TABS Take 1,000 mg by mouth 4 times daily as needed      calcitRIOL (ROCALTROL) 0.5 MCG capsule Take 0.5 mcg by mouth      acetaminophen (TYLENOL) 500 MG tablet Take 1,000 mg by mouth in the morning and 1,000 mg at noon and 1,000 mg in the evening.       losartan (COZAAR) 50 MG tablet Take 1 tablet by mouth nightly 30 tablet 3    B Complex-C-Folic Acid (DIALYVITE TABLET) TABS TAKE 1 TABLET BY MOUTH EVERY DAY      cloNIDine (CATAPRES) 0.3 MG tablet Take 1 tablet by mouth 3 times daily (Patient taking differently: Take 0.3 mg by mouth 2 times daily) 60 tablet 3    citalopram (CELEXA) 10 MG tablet TAKE 1 TABLET BY MOUTH EVERY DAY 90 tablet 1    simvastatin (ZOCOR) 40 MG tablet TAKE 1 TABLET BY MOUTH EVERY DAY AT NIGHT 90 tablet 1    metoprolol tartrate (LOPRESSOR) 50 MG tablet Take 1 tablet by mouth 2 times daily (Patient not taking: Reported on 2/16/2023) 60 tablet 3    Vitamin D, Cholecalciferol, 25 MCG (1000 UT) CAPS Take 1,000 Units by mouth daily (Patient not taking: Reported on 2/16/2023)       No current facility-administered medications for this visit.        Immunization History   Administered Date(s) Administered    COVID-19, PFIZER PURPLE top, DILUTE for use, (age 15 y+), 30mcg/0.3mL 06/10/2021, 07/01/2021    Hepatitis A Adult (Havrix, Vaqta) 03/24/2021    Hepatitis B 03/24/2021, 04/26/2021    Influenza, AFLURIA (age 1 yrs+), FLUZONE, (age 10 mo+), MDV, 0.5mL 01/26/2021    Pneumococcal Conjugate 13-valent (Rstikat26) 03/24/2021    Pneumococcal Polysaccharide (Uuwvhokku03) 05/24/2021    Tdap (Boostrix, Adacel) 01/26/2021       Allergies   Allergen Reactions    Iodinated Contrast Media Hives     All contrast  All contrast      Shellfish Allergy Anaphylaxis and Hives    Shellfish-Derived Products Anaphylaxis    Bee Pollen     Hydrocodone Itching    Nsaids Other (See Comments)     Does not take due to kidney function issues  Does not take due to kidney function issues  Does not take due to kidney function issues  Does not take due to kidney function issues      Peanut (Diagnostic)     Peanut-Containing Drug Products Other (See Comments)     [address] tree dust   Har[place]ld tree dust   Other reaction(s): Not Recorded  [place]ld tree dust   Other reaction(s): Not Recorded  [place]ld tree dust       Iodine Swelling and Rash contrast  contrast         Admission on 10/22/2022, Discharged on 10/24/2022   Component Date Value Ref Range Status    WBC 10/22/2022 12.2 (A)  4.0 - 11.0 K/uL Final    RBC 10/22/2022 4.04 (A)  4.20 - 5.90 M/uL Final    Hemoglobin 10/22/2022 11.2 (A)  13.5 - 17.5 g/dL Final    Hematocrit 10/22/2022 34.1 (A)  40.5 - 52.5 % Final    MCV 10/22/2022 84.5  80.0 - 100.0 fL Final    MCH 10/22/2022 27.6  26.0 - 34.0 pg Final    MCHC 10/22/2022 32.7  31.0 - 36.0 g/dL Final    RDW 10/22/2022 15.7 (A)  12.4 - 15.4 % Final    Platelets 95/69/9282 341  135 - 450 K/uL Final    MPV 10/22/2022 6.8  5.0 - 10.5 fL Final    Neutrophils % 10/22/2022 65.6  % Final    Lymphocytes % 10/22/2022 18.3  % Final    Monocytes % 10/22/2022 8.7  % Final    Eosinophils % 10/22/2022 6.4  % Final    Basophils % 10/22/2022 1.0  % Final    Neutrophils Absolute 10/22/2022 8.0 (A)  1.7 - 7.7 K/uL Final    Lymphocytes Absolute 10/22/2022 2.2  1.0 - 5.1 K/uL Final    Monocytes Absolute 10/22/2022 1.1  0.0 - 1.3 K/uL Final    Eosinophils Absolute 10/22/2022 0.8 (A)  0.0 - 0.6 K/uL Final    Basophils Absolute 10/22/2022 0.1  0.0 - 0.2 K/uL Final    Sodium 10/22/2022 139  136 - 145 mmol/L Final    Potassium reflex Magnesium 10/22/2022 4.0  3.5 - 5.1 mmol/L Final    Chloride 10/22/2022 100  99 - 110 mmol/L Final    CO2 10/22/2022 23  21 - 32 mmol/L Final    Anion Gap 10/22/2022 16  3 - 16 Final    Glucose 10/22/2022 111 (A)  70 - 99 mg/dL Final    BUN 10/22/2022 63 (A)  7 - 20 mg/dL Final    Creatinine 10/22/2022 5.0 (A)  0.9 - 1.3 mg/dL Final    Est, Glom Filt Rate 10/22/2022 14 (A)  >60 Final    Comment: Pediatric calculator link  [email or web address]_[email or web address]  Effective Oct 3, 2022  These results are not intended for use in patients  <25years of age. eGFR results are calculated without  a race factor using the 2021 CKD-EPI equation.   Careful  clinical correlation is recommended, particularly when  comparing to results calculated using previous equations. The CKD-EPI equation is less accurate in patients with  extremes of muscle mass, extra-renal metabolism of  creatinine, excessive creatinine ingestion, or following  therapy that affects renal tubular secretion. Calcium 10/22/2022 9.7  8.3 - 10.6 mg/dL Final    Total Protein 10/22/2022 7.3  6.4 - 8.2 g/dL Final    Albumin 10/22/2022 4.2  3.4 - 5.0 g/dL Final    Albumin/Globulin Ratio 10/22/2022 1.4  1.1 - 2.2 Final    Total Bilirubin 10/22/2022 <0.2  0.0 - 1.0 mg/dL Final    Alkaline Phosphatase 10/22/2022 74  40 - 129 U/L Final    ALT 10/22/2022 22  10 - 40 U/L Final    AST 10/22/2022 14 (A)  15 - 37 U/L Final    Lipase 10/22/2022 97.0 (A)  13.0 - 60.0 U/L Final    Color, UA 10/22/2022 Yellow  Straw/Yellow Final    Clarity, UA 10/22/2022 Clear  Clear Final    Glucose, Ur 10/22/2022 100 (A)  Negative mg/dL Final    Bilirubin Urine 10/22/2022 Negative  Negative Final    Ketones, Urine 10/22/2022 Negative  Negative mg/dL Final    Specific Gravity, UA 10/22/2022 1.020  1.005 - 1.030 Final    Blood, Urine 10/22/2022 SMALL (A)  Negative Final    pH, UA 10/22/2022 6.0  5.0 - 8.0 Final    Protein, UA 10/22/2022 100 (A)  Negative mg/dL Final    Urobilinogen, Urine 10/22/2022 0.2  <2.0 E.U./dL Final    Nitrite, Urine 10/22/2022 Negative  Negative Final    Leukocyte Esterase, Urine 10/22/2022 Negative  Negative Final    Microscopic Examination 10/22/2022 YES   Final    Urine Type 10/22/2022 NotGiven   Final    Urine received in a container without preservatives.     Urine Reflex to Culture 10/22/2022 Not Indicated   Final    Lactic Acid 10/22/2022 0.8  0.4 - 2.0 mmol/L Final    Procalcitonin 10/22/2022 0.33 (A)  0.00 - 0.15 ng/mL Final    Comment: Suspected Sepsis:  Low likelihood of sepsis  <.50 ng/mL    Increased likelihood of sepsis 0.50-2.00 ng/mL  Antibiotics encouraged    High risk of sepsis/shock   >2.00 ng/mL  Antibiotics strongly encouraged    Suspected Lower Respiratory Tract Infections:  Low likelihood of bacterial infection  <0.24 ng/mL    Increased likelihood of bacterial infection >0.24 ng/mL  Antibiotics encouraged    With successful antibiotic therapy, PCT levels should decrease  rapidly. (Half-life of 24 to 36 hours.)    Procalcitonin values from samples collected within the first  6 hours of systemic infection may still be low. Retesting may be indicated. Values from day 1 and day 4 can be entered into the Change in  Procalcitonin Calculator to determine the patient's  Mortality Risk Prognosis  (www.[email or web address]. [employer])    In healthy neonates, plasma Procalcitonin (PCT) concentrations  increase gradually after birth, reaching peak values at about  24 hours of age then decrease to normal values below 0.5                            ng/mL  by 48-72 hours of age. WBC, UA 10/22/2022 3-5  0 - 5 /HPF Final    RBC, UA 10/22/2022 5-10 (A)  0 - 4 /HPF Final    Epithelial Cells, UA 10/22/2022 0-1  0 - 5 /HPF Final    Sodium 10/23/2022 134 (A)  136 - 145 mmol/L Final    Potassium reflex Magnesium 10/23/2022 4.5  3.5 - 5.1 mmol/L Final    Chloride 10/23/2022 97 (A)  99 - 110 mmol/L Final    CO2 10/23/2022 26  21 - 32 mmol/L Final    Anion Gap 10/23/2022 11  3 - 16 Final    Glucose 10/23/2022 105 (A)  70 - 99 mg/dL Final    BUN 10/23/2022 41 (A)  7 - 20 mg/dL Final    Creatinine 10/23/2022 4.5 (A)  0.9 - 1.3 mg/dL Final    Est, Glom Filt Rate 10/23/2022 16 (A)  >60 Final    Comment: Pediatric calculator link  [email or web address]. [email or web address]_[email or web address]  Effective Oct 3, 2022  These results are not intended for use in patients  <25years of age. eGFR results are calculated without  a race factor using the 2021 CKD-EPI equation. Careful  clinical correlation is recommended, particularly when  comparing to results calculated using previous equations.   The CKD-EPI equation is less accurate in patients with  extremes of muscle mass, extra-renal metabolism of  creatinine, excessive creatinine ingestion, or following  therapy that affects renal tubular secretion.       Calcium 10/23/2022 8.9  8.3 - 10.6 mg/dL Final    Total Protein 10/23/2022 6.8  6.4 - 8.2 g/dL Final    Albumin 10/23/2022 3.9  3.4 - 5.0 g/dL Final    Albumin/Globulin Ratio 10/23/2022 1.3  1.1 - 2.2 Final    Total Bilirubin 10/23/2022 <0.2  0.0 - 1.0 mg/dL Final    Alkaline Phosphatase 10/23/2022 68  40 - 129 U/L Final    ALT 10/23/2022 18  10 - 40 U/L Final    AST 10/23/2022 12 (A)  15 - 37 U/L Final    WBC 10/23/2022 9.4  4.0 - 11.0 K/uL Final    RBC 10/23/2022 3.70 (A)  4.20 - 5.90 M/uL Final    Hemoglobin 10/23/2022 10.4 (A)  13.5 - 17.5 g/dL Final    Hematocrit 10/23/2022 31.3 (A)  40.5 - 52.5 % Final    MCV 10/23/2022 84.7  80.0 - 100.0 fL Final    MCH 10/23/2022 28.2  26.0 - 34.0 pg Final    MCHC 10/23/2022 33.3  31.0 - 36.0 g/dL Final    RDW 10/23/2022 15.3  12.4 - 15.4 % Final    Platelets 11/21/9408 275  135 - 450 K/uL Final    MPV 10/23/2022 6.4  5.0 - 10.5 fL Final    Neutrophils % 10/23/2022 59.7  % Final    Lymphocytes % 10/23/2022 22.8  % Final    Monocytes % 10/23/2022 10.1  % Final    Eosinophils % 10/23/2022 6.5  % Final    Basophils % 10/23/2022 0.9  % Final    Neutrophils Absolute 10/23/2022 5.6  1.7 - 7.7 K/uL Final    Lymphocytes Absolute 10/23/2022 2.1  1.0 - 5.1 K/uL Final    Monocytes Absolute 10/23/2022 0.9  0.0 - 1.3 K/uL Final    Eosinophils Absolute 10/23/2022 0.6  0.0 - 0.6 K/uL Final    Basophils Absolute 10/23/2022 0.1  0.0 - 0.2 K/uL Final    Hep B S Ab 10/22/2022 441.30  mIU/mL Final    Comment: <8.5 = Negative  >=8.5 and <11.5 = Indeterminate  >=11.5 = Positive (Immune)      Hep B S Ag Interp 10/22/2022 Non-reactive  Non-reactive Final    Lipase 10/23/2022 56.0  13.0 - 60.0 U/L Final    Sodium 10/24/2022 137  136 - 145 mmol/L Final    Potassium 10/24/2022 4.5  3.5 - 5.1 mmol/L Final    Chloride 10/24/2022 97 (A)  99 - 110 mmol/L Final    CO2 10/24/2022 25  21 - 32 mmol/L Final    Anion Gap 10/24/2022 15  3 - 16 Final    Glucose 10/24/2022 95  70 - 99 mg/dL Final    BUN 10/24/2022 49 (A)  7 - 20 mg/dL Final    Creatinine 10/24/2022 4.8 (A)  0.9 - 1.3 mg/dL Final    Est, Glom Filt Rate 10/24/2022 15 (A)  >60 Final    Comment: Pediatric calculator link  [facility].[email or web address]. [email or web address]_[email or web address]  Effective Oct 3, 2022  These results are not intended for use in patients  <25years of age. eGFR results are calculated without  a race factor using the 2021 CKD-EPI equation. Careful  clinical correlation is recommended, particularly when  comparing to results calculated using previous equations. The CKD-EPI equation is less accurate in patients with  extremes of muscle mass, extra-renal metabolism of  creatinine, excessive creatinine ingestion, or following  therapy that affects renal tubular secretion. Calcium 10/24/2022 9.8  8.3 - 10.6 mg/dL Final    Total Protein 10/24/2022 7.1  6.4 - 8.2 g/dL Final    Albumin 10/24/2022 4.1  3.4 - 5.0 g/dL Final    Albumin/Globulin Ratio 10/24/2022 1.4  1.1 - 2.2 Final    Total Bilirubin 10/24/2022 <0.2  0.0 - 1.0 mg/dL Final    Alkaline Phosphatase 10/24/2022 77  40 - 129 U/L Final    ALT 10/24/2022 17  10 - 40 U/L Final    AST 10/24/2022 12 (A)  15 - 37 U/L Final       Review of Systems   Constitutional:  Negative for fatigue. Genitourinary:  Positive for flank pain. Psychiatric/Behavioral:  Positive for decreased concentration. Negative for dysphoric mood. BP (!) 197/111 (Site: Left Upper Arm, Position: Sitting, Cuff Size: Large Adult)   Pulse 62   Temp 97.5 °F (36.4 °C) (Temporal)   Resp 16   Wt 196 lb 12.8 oz (89.3 kg)   SpO2 99%   BMI 26.69 kg/m²     Physical Exam  Vitals reviewed. Constitutional:       Appearance: Normal appearance. He is well-developed. HENT:      Head: Normocephalic and atraumatic. Eyes:      Pupils: Pupils are equal, round, and reactive to light.    Cardiovascular:      Rate and Rhythm: Normal rate and regular rhythm. Heart sounds: Normal heart sounds. No murmur heard. Pulmonary:      Effort: Pulmonary effort is normal.      Breath sounds: Normal breath sounds. No wheezing. Abdominal:      General: Bowel sounds are normal.      Tenderness: There is abdominal tenderness (at surgical site). Musculoskeletal:      Cervical back: Normal range of motion. Neurological:      Mental Status: He is alert and oriented to person, place, and time. Psychiatric:         Mood and Affect: Mood normal.         Behavior: Behavior normal.         Thought Content: Thought content normal.         Judgment: Judgment normal.       Plan   Diagnosis Orders   1. Gastroesophageal reflux disease without esophagitis  Stable, continue Pantoprazole 40 mg daily      2. Depressed mood  Stable, continue Celexa 10 mg daily      3. Hyperkalemia  Comprehensive Metabolic Panel      4. Uncontrolled hypertension  Home readings stable, continue clonidine 0.3 mg twice daily, losartan 50 mg, and nifedipine 60 mg extended release Lipid Panel    TSH with Reflex      5. Attention deficit hyperactivity disorder (ADHD), combined type  External Referral to Psychiatry        Pt will make a Nurse Visit appt and bring his cuff for comparison. Return in about 2 months (around 4/16/2023) for Physical Exam.    Prior to Visit Medications    Medication Sig Taking?  Authorizing Provider   warfarin (COUMADIN) 3 MG tablet  Yes Historical Provider, MD   sevelamer (RENVELA) 800 MG tablet TAKE 1 TABLET BY MOUTH THREE TIMES A DAY WITH MEALS Yes Historical Provider, MD   pantoprazole (PROTONIX) 40 MG tablet Take 40 mg by mouth daily Yes Historical Provider, MD   NIFEdipine (PROCARDIA XL) 60 MG extended release tablet TAKE 1 TABLET BY MOUTH EVERY DAY Yes Historical Provider, MD   midodrine (PROAMATINE) 5 MG tablet  Yes Historical Provider, MD   Methocarbamol 1000 MG TABS Take 1,000 mg by mouth 4 times daily as needed Yes Historical Provider, MD   calcitRIOL (ROCALTROL) 0.5 MCG capsule Take 0.5 mcg by mouth Yes Historical Provider, MD   acetaminophen (TYLENOL) 500 MG tablet Take 1,000 mg by mouth in the morning and 1,000 mg at noon and 1,000 mg in the evening.  Yes Historical Provider, MD   losartan (COZAAR) 50 MG tablet Take 1 tablet by mouth nightly Yes Corin Ag MD   B Complex-C-Folic Acid (DIALYVITE TABLET) TABS TAKE 1 TABLET BY MOUTH EVERY DAY Yes Historical Provider, MD   cloNIDine (CATAPRES) 0.3 MG tablet Take 1 tablet by mouth 3 times daily  Patient taking differently: Take 0.3 mg by mouth 2 times daily Yes Cam Romero MD   citalopram (CELEXA) 10 MG tablet TAKE 1 TABLET BY MOUTH EVERY DAY Yes Sonya Chew DO   simvastatin (ZOCOR) 40 MG tablet TAKE 1 TABLET BY MOUTH EVERY DAY AT NIGHT Yes Sonya Chew,    metoprolol tartrate (LOPRESSOR) 50 MG tablet Take 1 tablet by mouth 2 times daily  Patient not taking: Reported on 2/16/2023  Corin Ag MD   Vitamin D, Cholecalciferol, 25 MCG (1000 UT) CAPS Take 1,000 Units by mouth daily  Patient not taking: Reported on 2/16/2023  Historical Provider, MD

## 2023-04-04 ENCOUNTER — TELEPHONE (OUTPATIENT)
Dept: FAMILY MEDICINE CLINIC | Age: 43
End: 2023-04-04

## 2023-04-04 ENCOUNTER — ANTI-COAG VISIT (OUTPATIENT)
Dept: PHARMACY | Age: 43
End: 2023-04-04
Payer: COMMERCIAL

## 2023-04-04 DIAGNOSIS — F90.2 ATTENTION DEFICIT HYPERACTIVITY DISORDER (ADHD), COMBINED TYPE: Primary | ICD-10-CM

## 2023-04-04 LAB — INTERNATIONAL NORMALIZATION RATIO, POC: 1.1

## 2023-04-04 PROCEDURE — 85610 PROTHROMBIN TIME: CPT | Performed by: PHARMACIST

## 2023-04-04 PROCEDURE — 99211 OFF/OP EST MAY X REQ PHY/QHP: CPT | Performed by: PHARMACIST

## 2023-04-04 RX ORDER — METOPROLOL TARTRATE 100 MG/1
100 TABLET ORAL 2 TIMES DAILY
COMMUNITY

## 2023-04-04 NOTE — TELEPHONE ENCOUNTER
Dr. Sonia Carter referred this patient to psychiatry ([facility] Monday) on 02/16. Tereza Pompa reports that they don't prescribe meds for his adhd. He would like another referral to a place that does.

## 2023-04-04 NOTE — PROGRESS NOTES
Mr. Vonnie De Jesus is here for management of anticoagulation for DVT/ CVC catheter clot. PMH also significant for ESRD on HD(both kidneys removed), HTN, HLD, GERD. He presents today w/out complaint. Pt verifies dosing regimen as listed above. Pt denies s/s bleeding/bruising/swelling/SOB. No BRBPR. No melena. Address missed doses  Reviewed pt medication list  No changes in RX/OTCs/Herbal medications. Reviewed dietary concerns  Address EToH and tobacco use. As initial clinic visit, provided pt with counseling for medication use/compliance, adverse events, and nutrition; clinic overview & orientation; and educational materials. He has been taking 3 mg daily for the past 1.5 weeks. Will be off warfarin starting 4/16 for a procedure on 4/21. INR near baseline after being on 3 mg daily for past 1.5 weeks, will make large dose increase today. Planned for removal of CVC in 2-3 months, will hopefully be able to stop warfarin at that point. INR 1.1 is below therapeutic range of 2-3  Recommend to increase dose to 4.5 mg daily  Patient has 3 mg tablets. Will continue to monitor and check INR in 1 week. Dosing reminder card given with phone number, appointment date and time.    Return to clinic: 4/13 @ 0800    Chico Da Silva, PharmD 11:28 AM EDT 4/4/23

## 2023-04-05 NOTE — TELEPHONE ENCOUNTER
Spoke with patient, He requested information be sent to him via my chart.  Information sent as requested

## 2023-04-24 RX ORDER — PANTOPRAZOLE SODIUM 40 MG/1
40 TABLET, DELAYED RELEASE ORAL DAILY
Qty: 90 TABLET | Refills: 0 | Status: SHIPPED | OUTPATIENT
Start: 2023-04-24

## 2023-04-24 NOTE — TELEPHONE ENCOUNTER
Patient needs a refill on pantoprazole (PROTONIX) 40 MG tablet  . They need a 90 day supply.          Pharmacy: [facility]/PHARMACY [address], [facility]

## 2023-05-09 ENCOUNTER — ANTI-COAG VISIT (OUTPATIENT)
Dept: PHARMACY | Age: 43
End: 2023-05-09
Payer: COMMERCIAL

## 2023-05-09 LAB — INTERNATIONAL NORMALIZATION RATIO, POC: 1.8

## 2023-05-09 PROCEDURE — 99211 OFF/OP EST MAY X REQ PHY/QHP: CPT | Performed by: PHARMACIST

## 2023-05-09 PROCEDURE — 85610 PROTHROMBIN TIME: CPT | Performed by: PHARMACIST

## 2023-05-09 NOTE — PROGRESS NOTES
MrJosue Chung is here for management of anticoagulation for DVT/ CVC catheter clot. PMH also significant for ESRD on HD(both kidneys removed), HTN, HLD, GERD. He presents today w/out complaint. Pt verifies dosing regimen as listed above. Pt denies s/s bleeding/bruising/swelling/SOB. No BRBPR. No melena. Address missed doses  Reviewed pt medication list  No changes in RX/OTCs/Herbal medications. Reviewed dietary concerns  Address EToH and tobacco use. He has been bruising recently. Has been back on warfarin for about 2 weeks since procedure. Has appt to have new access checked this week, and then can be tested in a few more weeks at which point hopefully the CVC line can be removed and warfarin stopped. INR 1.8 is below therapeutic range of 2-3  Recommend to increase dose to 4.5 mg daily except 6 mg Tue  Patient has 3 mg tablets. Will continue to monitor and check INR in 9 days. Dosing reminder card given with phone number, appointment date and time.    Return to clinic: 5/18 @ 3 Jimenez Duffy PharmD 1:07 PM EDT 5/9/23

## 2023-05-18 ENCOUNTER — ANTI-COAG VISIT (OUTPATIENT)
Dept: PHARMACY | Age: 43
End: 2023-05-18
Payer: COMMERCIAL

## 2023-05-18 LAB — INTERNATIONAL NORMALIZATION RATIO, POC: 1.7

## 2023-05-18 PROCEDURE — 99211 OFF/OP EST MAY X REQ PHY/QHP: CPT | Performed by: PHARMACIST

## 2023-05-18 PROCEDURE — 85610 PROTHROMBIN TIME: CPT | Performed by: PHARMACIST

## 2023-05-18 NOTE — PROGRESS NOTES
Mr. Waylon Rosen is here for management of anticoagulation for DVT/ CVC catheter clot. PMH also significant for ESRD on HD(both kidneys removed), HTN, HLD, GERD. He presents today w/out complaint. Pt verifies dosing regimen as listed above. Pt denies s/s bleeding/bruising/swelling/SOB. No BRBPR. No melena. Address missed doses  Reviewed pt medication list  No changes in RX/OTCs/Herbal medications. Reviewed dietary concerns  Address EToH and tobacco use. No recent changes per pt. INR slightly lower today despite dose increase last visit. No missed doses or diet changes. INR 1.7 is below therapeutic range of 2-3  Recommend to increase dose to 4.5 mg daily except 6 mg Tue/Thu/Sat  Patient has 3 mg tablets. Will continue to monitor and check INR in 2 weeks. Dosing reminder card given with phone number, appointment date and time.    Return to clinic: 6/1 @ 0700    Rosario Warner PharmD 7:14 AM EDT 5/18/23

## 2023-06-01 ENCOUNTER — ANTI-COAG VISIT (OUTPATIENT)
Dept: PHARMACY | Age: 43
End: 2023-06-01
Payer: COMMERCIAL

## 2023-06-01 LAB — INTERNATIONAL NORMALIZATION RATIO, POC: 2.1

## 2023-06-01 PROCEDURE — 99211 OFF/OP EST MAY X REQ PHY/QHP: CPT | Performed by: PHARMACIST

## 2023-06-01 PROCEDURE — 85610 PROTHROMBIN TIME: CPT | Performed by: PHARMACIST

## 2023-06-01 NOTE — PROGRESS NOTES
Mr. Chadd Michaels is here for management of anticoagulation for DVT/ CVC catheter clot. PMH also significant for ESRD on HD(both kidneys removed), HTN, HLD, GERD. He presents today w/out complaint. Pt verifies dosing regimen as listed above. Pt denies s/s bleeding/bruising/swelling/SOB. No BRBPR. No melena. Address missed doses  Reviewed pt medication list  No changes in RX/OTCs/Herbal medications. Reviewed dietary concerns  Address EToH and tobacco use. No recent changes per pt. Having fistula checked next week, should be able to stop warfarin if everything checks out per pt? Will plan to recheck in 2 weeks if he remains on warfarin, pt will call to cancel appt if able to stop warfarin. INR 2.1 is within therapeutic range of 2-3  Recommend to continue dose of 4.5 mg daily except 6 mg Tue/Thu/Sat  Patient has 3 mg tablets. Will continue to monitor and check INR in 2 weeks. Dosing reminder card given with phone number, appointment date and time.    Return to clinic: 6/15/23 @ 0700    Panchito Marsh PharmD 7:03 AM EDT 6/1/23

## 2023-07-20 RX ORDER — PANTOPRAZOLE SODIUM 40 MG/1
TABLET, DELAYED RELEASE ORAL
Qty: 90 TABLET | Refills: 0 | Status: SHIPPED | OUTPATIENT
Start: 2023-07-20

## 2023-07-20 NOTE — PROGRESS NOTES
2020    TELEHEALTH EVALUATION -- Audio/Visual (During CMAPB-67 public health emergency)    HPI:    Rogel Members (:  1980) has requested an audio/video evaluation for the following concern(s):    Pt presents today via doxy. me Video visit to establish care. ADD: Dx with ADHD as a child. Currently taking Ritalin 10 mg BID, will run out of medication on 10/06/2020. Anxiety/Depression: Currently taking Celexa 20 mg daily, states that his anxiety/depression has worsened, worse at night. Renal: Dx with Polycystic kidney disease. Taking Allopurinol 100 mg to help decrease uric acid for kidneys, has upcoming appointment with nephrology, Dr. Kar Peña. Admits to [facility] of kidney issues. GERD: Currently taking Nexium 40 mg 3-4 times a day. Was prescribed Dexilant which helped but insurance denies    HTN: Currently taking Amlodipine 10 mg and Lisinopril 40 mg, BP readings 127-130/90-95      Review of Systems   Gastrointestinal:        Positive for reflux   Genitourinary:        Positive for polycystic kidneys   Psychiatric/Behavioral: Positive for decreased concentration and dysphoric mood. The patient is nervous/anxious. Prior to Visit Medications    Medication Sig Taking? Authorizing Provider   dexlansoprazole (DEXILANT) 30 MG CPDR delayed release capsule Take 30 mg by mouth daily Yes Green Poke, DO   citalopram (CELEXA) 10 MG tablet Take 1 tablet by mouth daily Yes Green Poke, DO   allopurinol (ZYLOPRIM) 100 MG tablet Take 100 mg by mouth daily Yes Historical Provider, MD   esomeprazole Magnesium (NEXIUM) 20 MG PACK Take 20 mg by mouth daily Yes Historical Provider, MD   amLODIPine (NORVASC) 10 MG tablet Take 10 mg by mouth daily Yes Historical Provider, MD   methylphenidate (RITALIN) 10 MG tablet Take 10 mg by mouth 2 times daily. Yes Historical Provider, MD   simvastatin (ZOCOR) 10 MG tablet Take 10 mg by mouth nightly.    Yes Historical Provider, MD   lisinopril (PRINIVIL;ZESTRIL) [x] No visualized mass     Pulmonary/Chest: [x] Respiratory effort normal.  [x] No visualized signs of difficulty breathing or respiratory distress        [] Abnormal-      Musculoskeletal:   [] Normal gait with no signs of ataxia         [x] Normal range of motion of neck        [] Abnormal-       Neurological:        [x] No Facial Asymmetry (Cranial nerve 7 motor function) (limited exam to video visit)          [x] No gaze palsy        [] Abnormal-         Skin:        [x] No significant exanthematous lesions or discoloration noted on facial skin         [] Abnormal-            Psychiatric:       [x] Normal Affect [] No Hallucinations        [] Abnormal-     Other pertinent observable physical exam findings-     ASSESSMENT/PLAN:  1. Hypertension, unspecified type  - will discuss with nephrology, Dr. Carlos Costello  - TSH without Reflex; Future  - Lipid Panel; Future  - CBC Auto Differential; Future  - Comprehensive Metabolic Panel; Future    2. Attention deficit disorder (ADD) without hyperactivity  - External Referral to Psychiatry  - pt will try to schedule appointment when he will be home in early October and notify our office if this cannot be done    3. Gastroesophageal reflux disease, esophagitis presence not specified  - dexlansoprazole (DEXILANT) 30 MG CPDR delayed release capsule; Take 30 mg by mouth daily  Dispense: 90 capsule; Refill: 1    4. Depressed mood  - increased Celexa to 30 mg daily  - citalopram (CELEXA) 10 MG tablet; Take 1 tablet by mouth daily  Dispense: 30 tablet; Refill: 5    5. Anxiety  - increased Celexa to 30 mg daily  - citalopram (CELEXA) 10 MG tablet; Take 1 tablet by mouth daily  Dispense: 30 tablet; Refill: 5    6. Polycystic kidney disease  - upcoming appointment with nephrology, Dr. Carlos Costello      No follow-ups on file. Counseled pt that I do prescribe/refill Ritilin and will refer to psych.      Randall So is a 45 y.o. male being evaluated by a Virtual Visit (video visit) encounter to Cartilage Graft Text: The defect edges were debeveled with a #15 scalpel blade.  Given the location of the defect, shape of the defect, the fact the defect involved a full thickness cartilage defect a cartilage graft was deemed most appropriate.  An appropriate donor site was identified, cleansed, and anesthetized. The cartilage graft was then harvested and transferred to the recipient site, oriented appropriately and then sutured into place.  The secondary defect was then repaired using a primary closure.

## 2023-07-20 NOTE — TELEPHONE ENCOUNTER
Patient needs a refill on pantoprazole (PROTONIX) 40 MG tablet   . They need a 90 day supply.          Pharmacy: [facility]/PHARMACY [address], [facility],[address]

## 2023-07-21 RX ORDER — PANTOPRAZOLE SODIUM 40 MG/1
40 TABLET, DELAYED RELEASE ORAL DAILY
Qty: 90 TABLET | Refills: 0 | Status: SHIPPED | OUTPATIENT
Start: 2023-07-21 | End: 2023-07-21 | Stop reason: SDUPTHER

## 2023-07-26 ENCOUNTER — TELEPHONE (OUTPATIENT)
Dept: FAMILY MEDICINE CLINIC | Age: 43
End: 2023-07-26

## 2023-07-26 NOTE — TELEPHONE ENCOUNTER
----- Message from Nathan Pod sent at 7/26/2023  2:38 PM EDT -----  Subject: Appointment Request    Reason for Call: Established Patient Appointment needed: Routine (Patient   Request) No Script    QUESTIONS    Reason for appointment request? Requested Provider unavailable - Jacquie Rivera     Additional Information for Provider? Pt said he is established with DR ELENA [facility] but is listed in [facility] as established with DR Андрей Michelle. he   would like to schedule with DR ELENA [facility] to discuss concerns about his   testosterone levels and see what testing they can do.  Please advise.   ---------------------------------------------------------------------------  --------------  Bill ROBBINS  [phone]; OK to leave message on voicemail  ---------------------------------------------------------------------------  --------------  SCRIPT ANSWERS

## 2023-07-26 NOTE — TELEPHONE ENCOUNTER
Future Appointments   Date Time Provider [address]   7/27/2023  8:20 AM Saniya Crooks DO [place] [address]

## 2023-07-27 ENCOUNTER — OFFICE VISIT (OUTPATIENT)
Dept: FAMILY MEDICINE CLINIC | Age: 43
End: 2023-07-27
Payer: COMMERCIAL

## 2023-07-27 VITALS
DIASTOLIC BLOOD PRESSURE: 90 MMHG | BODY MASS INDEX: 26.88 KG/M2 | TEMPERATURE: 97.5 F | WEIGHT: 198.2 LBS | OXYGEN SATURATION: 96 % | RESPIRATION RATE: 16 BRPM | HEART RATE: 74 BPM | SYSTOLIC BLOOD PRESSURE: 148 MMHG

## 2023-07-27 DIAGNOSIS — F33.0 MAJOR DEPRESSIVE DISORDER, RECURRENT, MILD (HCC): ICD-10-CM

## 2023-07-27 DIAGNOSIS — R68.82 LOW LIBIDO: Primary | ICD-10-CM

## 2023-07-27 DIAGNOSIS — R36.1 BLOOD IN SEMEN: ICD-10-CM

## 2023-07-27 DIAGNOSIS — N52.9 ERECTILE DYSFUNCTION, UNSPECIFIED ERECTILE DYSFUNCTION TYPE: ICD-10-CM

## 2023-07-27 DIAGNOSIS — I10 UNCONTROLLED HYPERTENSION: ICD-10-CM

## 2023-07-27 DIAGNOSIS — R68.82 LOW LIBIDO: ICD-10-CM

## 2023-07-27 PROBLEM — Z99.2 ENCOUNTER FOR HEMODIALYSIS (HCC): Status: ACTIVE | Noted: 2023-07-27

## 2023-07-27 PROCEDURE — 3077F SYST BP >= 140 MM HG: CPT | Performed by: FAMILY MEDICINE

## 2023-07-27 PROCEDURE — 3080F DIAST BP >= 90 MM HG: CPT | Performed by: FAMILY MEDICINE

## 2023-07-27 PROCEDURE — 99214 OFFICE O/P EST MOD 30 MIN: CPT | Performed by: FAMILY MEDICINE

## 2023-07-27 RX ORDER — NIFEDIPINE 90 MG/1
90 TABLET, FILM COATED, EXTENDED RELEASE ORAL DAILY
COMMUNITY
Start: 2023-05-22 | End: 2023-07-27

## 2023-07-27 RX ORDER — METOPROLOL TARTRATE 50 MG/1
50 TABLET, FILM COATED ORAL 2 TIMES DAILY
COMMUNITY
Start: 2023-05-11

## 2023-07-27 RX ORDER — METHYLPHENIDATE HYDROCHLORIDE 20 MG/1
20 TABLET ORAL 2 TIMES DAILY
COMMUNITY
Start: 2023-07-11

## 2023-07-30 LAB
SHBG SERPL-SCNC: 21 NMOL/L (ref 11–80)
TESTOST FREE SERPL-MCNC: 67 PG/ML (ref 47–244)
TESTOST SERPL-MCNC: 271 NG/DL (ref 220–1000)

## 2023-09-26 ENCOUNTER — OFFICE VISIT (OUTPATIENT)
Dept: FAMILY MEDICINE CLINIC | Age: 43
End: 2023-09-26
Payer: COMMERCIAL

## 2023-09-26 VITALS
HEART RATE: 78 BPM | WEIGHT: 200 LBS | DIASTOLIC BLOOD PRESSURE: 73 MMHG | HEIGHT: 72 IN | BODY MASS INDEX: 27.09 KG/M2 | OXYGEN SATURATION: 99 % | RESPIRATION RATE: 16 BRPM | TEMPERATURE: 97.1 F | SYSTOLIC BLOOD PRESSURE: 114 MMHG

## 2023-09-26 DIAGNOSIS — R10.32 ACUTE ABDOMINAL PAIN IN LEFT LOWER QUADRANT: Primary | ICD-10-CM

## 2023-09-26 DIAGNOSIS — Z91.041 CONTRAST MEDIA ALLERGY: ICD-10-CM

## 2023-09-26 DIAGNOSIS — R10.32 ACUTE ABDOMINAL PAIN IN LEFT LOWER QUADRANT: ICD-10-CM

## 2023-09-26 DIAGNOSIS — K21.9 GASTROESOPHAGEAL REFLUX DISEASE WITHOUT ESOPHAGITIS: ICD-10-CM

## 2023-09-26 DIAGNOSIS — Q61.3 PKD (POLYCYSTIC KIDNEY DISEASE): ICD-10-CM

## 2023-09-26 DIAGNOSIS — Z90.5 HISTORY OF NEPHRECTOMY: ICD-10-CM

## 2023-09-26 LAB
BASOPHILS # BLD: 0.1 K/UL (ref 0–0.2)
BASOPHILS NFR BLD: 1.8 %
DEPRECATED RDW RBC AUTO: 16.4 % (ref 12.4–15.4)
EOSINOPHIL # BLD: 0 K/UL (ref 0–0.6)
EOSINOPHIL NFR BLD: 1 %
HCT VFR BLD AUTO: 30.9 % (ref 40.5–52.5)
HGB BLD-MCNC: 10.3 G/DL (ref 13.5–17.5)
LIPASE SERPL-CCNC: 89 U/L (ref 13–60)
LYMPHOCYTES # BLD: 1.7 K/UL (ref 1–5.1)
LYMPHOCYTES NFR BLD: 35.1 %
MCH RBC QN AUTO: 31.3 PG (ref 26–34)
MCHC RBC AUTO-ENTMCNC: 33.3 G/DL (ref 31–36)
MCV RBC AUTO: 94 FL (ref 80–100)
MONOCYTES # BLD: 0.5 K/UL (ref 0–1.3)
MONOCYTES NFR BLD: 10.3 %
NEUTROPHILS # BLD: 2.4 K/UL (ref 1.7–7.7)
NEUTROPHILS NFR BLD: 51.8 %
PLATELET # BLD AUTO: 266 K/UL (ref 135–450)
PMV BLD AUTO: 7.9 FL (ref 5–10.5)
RBC # BLD AUTO: 3.29 M/UL (ref 4.2–5.9)
WBC # BLD AUTO: 4.7 K/UL (ref 4–11)

## 2023-09-26 PROCEDURE — 3074F SYST BP LT 130 MM HG: CPT | Performed by: STUDENT IN AN ORGANIZED HEALTH CARE EDUCATION/TRAINING PROGRAM

## 2023-09-26 PROCEDURE — 3078F DIAST BP <80 MM HG: CPT | Performed by: STUDENT IN AN ORGANIZED HEALTH CARE EDUCATION/TRAINING PROGRAM

## 2023-09-26 PROCEDURE — 99214 OFFICE O/P EST MOD 30 MIN: CPT | Performed by: STUDENT IN AN ORGANIZED HEALTH CARE EDUCATION/TRAINING PROGRAM

## 2023-09-26 RX ORDER — DIPHENHYDRAMINE HCL 25 MG
50 CAPSULE ORAL ONCE
Qty: 2 CAPSULE | Refills: 0 | Status: SHIPPED | OUTPATIENT
Start: 2023-09-26 | End: 2023-09-26

## 2023-09-26 RX ORDER — OMEPRAZOLE 40 MG/1
40 CAPSULE, DELAYED RELEASE ORAL DAILY
Qty: 30 CAPSULE | Refills: 3 | Status: CANCELLED | OUTPATIENT
Start: 2023-09-26

## 2023-09-26 RX ORDER — FAMOTIDINE 20 MG/1
20 TABLET, FILM COATED ORAL NIGHTLY PRN
Qty: 30 TABLET | Refills: 0 | Status: SHIPPED | OUTPATIENT
Start: 2023-09-26

## 2023-09-26 RX ORDER — PREDNISONE 50 MG/1
50 TABLET ORAL EVERY 6 HOURS
Qty: 3 TABLET | Refills: 0 | Status: SHIPPED | OUTPATIENT
Start: 2023-09-26 | End: 2023-09-27 | Stop reason: ALTCHOICE

## 2023-09-26 RX ORDER — AMOXICILLIN 500 MG/1
CAPSULE ORAL
COMMUNITY
Start: 2023-09-19

## 2023-09-26 SDOH — ECONOMIC STABILITY: INCOME INSECURITY: HOW HARD IS IT FOR YOU TO PAY FOR THE VERY BASICS LIKE FOOD, HOUSING, MEDICAL CARE, AND HEATING?: NOT HARD AT ALL

## 2023-09-26 SDOH — ECONOMIC STABILITY: FOOD INSECURITY: WITHIN THE PAST 12 MONTHS, THE FOOD YOU BOUGHT JUST DIDN'T LAST AND YOU DIDN'T HAVE MONEY TO GET MORE.: NEVER TRUE

## 2023-09-26 SDOH — ECONOMIC STABILITY: FOOD INSECURITY: WITHIN THE PAST 12 MONTHS, YOU WORRIED THAT YOUR FOOD WOULD RUN OUT BEFORE YOU GOT MONEY TO BUY MORE.: NEVER TRUE

## 2023-09-26 ASSESSMENT — PATIENT HEALTH QUESTIONNAIRE - PHQ9
SUM OF ALL RESPONSES TO PHQ QUESTIONS 1-9: 0
SUM OF ALL RESPONSES TO PHQ QUESTIONS 1-9: 0
4. FEELING TIRED OR HAVING LITTLE ENERGY: 0
7. TROUBLE CONCENTRATING ON THINGS, SUCH AS READING THE NEWSPAPER OR WATCHING TELEVISION: 0
SUM OF ALL RESPONSES TO PHQ QUESTIONS 1-9: 0
5. POOR APPETITE OR OVEREATING: 0
1. LITTLE INTEREST OR PLEASURE IN DOING THINGS: 0
SUM OF ALL RESPONSES TO PHQ QUESTIONS 1-9: 0
10. IF YOU CHECKED OFF ANY PROBLEMS, HOW DIFFICULT HAVE THESE PROBLEMS MADE IT FOR YOU TO DO YOUR WORK, TAKE CARE OF THINGS AT HOME, OR GET ALONG WITH OTHER PEOPLE: 0
2. FEELING DOWN, DEPRESSED OR HOPELESS: 0
6. FEELING BAD ABOUT YOURSELF - OR THAT YOU ARE A FAILURE OR HAVE LET YOURSELF OR YOUR FAMILY DOWN: 0
SUM OF ALL RESPONSES TO PHQ9 QUESTIONS 1 & 2: 0
3. TROUBLE FALLING OR STAYING ASLEEP: 0
8. MOVING OR SPEAKING SO SLOWLY THAT OTHER PEOPLE COULD HAVE NOTICED. OR THE OPPOSITE, BEING SO FIGETY OR RESTLESS THAT YOU HAVE BEEN MOVING AROUND A LOT MORE THAN USUAL: 0
9. THOUGHTS THAT YOU WOULD BE BETTER OFF DEAD, OR OF HURTING YOURSELF: 0

## 2023-09-26 NOTE — PATIENT INSTRUCTIONS
-     predniSONE (DELTASONE) 50 MG tablet; Take 1 tablet by mouth in the morning and 1 tablet at noon and 1 tablet in the evening and 1 tablet before bedtime. Do all this for 3 doses. First dose should be given 13 hours prior to contrast media administration. ., Disp-3 tablet, R-0Normal  -     diphenhydrAMINE (BENADRYL ALLERGY) 25 MG capsule; Take 2 capsules by mouth once for 1 dose Dose should be given 1 hour prior to contrast media administration. , Disp-2 capsule, R-0Normal    - CT abdomen and pelvis schedule for tomorrow   - Labs today   - ER for acute worsening of pain   - Famotidine at night   - Continue includes a clear liquid diet for 3-5 days

## 2023-09-26 NOTE — PROGRESS NOTES
[place] [address], [address] [place] [address]  Tel: [phone]      2023   SUBJECTIVE/OBJECTIVE  MAGGIE Hunter (:  1980) is a 44 y.o. male, here for evaluation of the following medical concerns:  Chief Complaint   Patient presents with    Abdominal Pain     5-6 days after eating   Patient is a 44 y.o. male  has a past medical history of ADD (attention deficit disorder with hyperactivity),  Hyperlipidemia, Hypertension, GERD and PKD s.p nephrectomy on dialysis who presents with abdominal pain. Abdominal Pain  This is a new (Located on left side up to shoulder blade into middle of the stomach.) problem. The onset quality is sudden. The problem occurs intermittently. The problem has been gradually worsening. The pain is located in the LUQ, left flank and LLQ. The pain is at a severity of 4/10. The quality of the pain is sharp and dull. The abdominal pain radiates to the left flank and back. Pertinent negatives include no anorexia, arthralgias, belching, constipation, diarrhea, dysuria, fever, flatus, headaches, hematochezia, melena, nausea or vomiting. Exacerbated by: food, eating and drinking. Currently patient is only eating broth . No alcohol use or smoking   Reports history of toothache, was on amoxicillin. Took ibuprofen for toothache, when stomach isssues started. Patient has a history of GERD, currently taking pantoprazole 40 mg daily. Previously on omeprazole. History of recent nephrectomy. Kidney removed in January, goes to dialysis 3 times a week. History of Pancreatitis in October. History of colonoscopy < 2 years ago. 21 Small diverticulosis    Hypertension  Taking Metoprolol 50 mg BID, Nifedipine XL 60 mg and Losartan 50 mg. Patient denies headache, lightheadedness, blurred vision, chest pain, palpitations, shortness of breath,  peripheral edema, or dry cough,.              Physical exam  /73 (Site: Left Upper

## 2023-09-27 ENCOUNTER — HOSPITAL ENCOUNTER (OUTPATIENT)
Dept: CT IMAGING | Age: 43
Discharge: HOME | End: 2023-09-27
Payer: COMMERCIAL

## 2023-09-27 DIAGNOSIS — R16.0 LIVER MASS: Primary | ICD-10-CM

## 2023-09-27 DIAGNOSIS — T50.995S ALLERGIC REACTION TO DYE, SEQUELA: ICD-10-CM

## 2023-09-27 DIAGNOSIS — R10.32 ACUTE ABDOMINAL PAIN IN LEFT LOWER QUADRANT: ICD-10-CM

## 2023-09-27 PROCEDURE — 74177 CT ABD & PELVIS W/CONTRAST: CPT

## 2023-09-27 PROCEDURE — 6360000004 HC RX CONTRAST MEDICATION: Performed by: STUDENT IN AN ORGANIZED HEALTH CARE EDUCATION/TRAINING PROGRAM

## 2023-09-27 RX ORDER — DIPHENHYDRAMINE HCL 25 MG
50 CAPSULE ORAL ONCE
Qty: 2 CAPSULE | Refills: 0 | Status: SHIPPED | OUTPATIENT
Start: 2023-09-27 | End: 2023-09-27

## 2023-09-27 RX ORDER — PREDNISONE 50 MG/1
50 TABLET ORAL EVERY 6 HOURS
Qty: 3 TABLET | Refills: 0 | Status: SHIPPED | OUTPATIENT
Start: 2023-09-27 | End: 2023-09-28

## 2023-09-27 RX ADMIN — IOMEPROL INJECTION 75 ML: 714 INJECTION, SOLUTION INTRAVASCULAR at 13:36

## 2023-09-27 RX ADMIN — DIATRIZOATE MEGLUMINE AND DIATRIZOATE SODIUM 12 ML: 660; 100 LIQUID ORAL; RECTAL at 13:12

## 2023-09-27 NOTE — PROGRESS NOTES
predniSONE (DELTASONE) 50 MG tablet; Take 1 tablet by mouth in the morning and 1 tablet at noon and 1 tablet in the evening and 1 tablet before bedtime. Do all this for 3 doses. First dose should be given 13 hours prior to contrast media administration. ., Disp-3 tablet, R-0Normal  -     diphenhydrAMINE (BENADRYL ALLERGY) 25 MG capsule; Take 2 capsules by mouth once for 1 dose Dose should be given 1 hour prior to contrast media administration. , Disp-2 capsule, R-0Normal

## 2023-09-28 ENCOUNTER — PATIENT MESSAGE (OUTPATIENT)
Dept: FAMILY MEDICINE CLINIC | Age: 43
End: 2023-09-28

## 2023-09-28 DIAGNOSIS — R16.0 LIVER MASS: Primary | ICD-10-CM

## 2023-09-29 ENCOUNTER — TELEPHONE (OUTPATIENT)
Dept: FAMILY MEDICINE CLINIC | Age: 43
End: 2023-09-29

## 2023-09-29 NOTE — TELEPHONE ENCOUNTER
Central scheduling called. Patient has had his CT Abdomen done 9/27. He called to schedule MRI and there is not an order. Please place order if appropriate.   Please let pt know one way or the other

## 2023-10-02 ENCOUNTER — TELEPHONE (OUTPATIENT)
Dept: FAMILY MEDICINE CLINIC | Age: 43
End: 2023-10-02

## 2023-10-02 NOTE — TELEPHONE ENCOUNTER
[facility] central scheduling- called and stated there is a MRI order in that needs changed.      Need new order that states- MRI abdomen with and with out contrast     Please advise

## 2023-10-03 DIAGNOSIS — R10.32 ACUTE ABDOMINAL PAIN IN LEFT LOWER QUADRANT: ICD-10-CM

## 2023-10-03 RX ORDER — FAMOTIDINE 20 MG/1
20 TABLET, FILM COATED ORAL NIGHTLY PRN
Qty: 30 TABLET | Refills: 0 | OUTPATIENT
Start: 2023-10-03

## 2023-10-03 NOTE — TELEPHONE ENCOUNTER
Called Central Scheduling, On hold for 10:42, hung up called back about 10 minutes later, phone just rang.

## 2023-10-04 NOTE — TELEPHONE ENCOUNTER
Order was changed. Patient tolerated contrast after taking Benadryl and prednisone for last imaging.

## 2023-10-09 RX ORDER — PANTOPRAZOLE SODIUM 40 MG/1
TABLET, DELAYED RELEASE ORAL
Qty: 90 TABLET | Refills: 0 | Status: SHIPPED | OUTPATIENT
Start: 2023-10-09

## 2023-10-09 NOTE — TELEPHONE ENCOUNTER
Future Appointments   Date Time Provider [address]   10/12/2023 10:00 AM [facility]     LOV 9/26/2023

## 2023-10-12 ENCOUNTER — HOSPITAL ENCOUNTER (OUTPATIENT)
Dept: MRI IMAGING | Age: 43
Discharge: HOME | End: 2023-10-12
Payer: COMMERCIAL

## 2023-10-12 DIAGNOSIS — R16.0 LIVER MASS: ICD-10-CM

## 2023-10-12 PROCEDURE — A9579 GAD-BASE MR CONTRAST NOS,1ML: HCPCS | Performed by: STUDENT IN AN ORGANIZED HEALTH CARE EDUCATION/TRAINING PROGRAM

## 2023-10-12 PROCEDURE — 74183 MRI ABD W/O CNTR FLWD CNTR: CPT

## 2023-10-12 PROCEDURE — 6360000004 HC RX CONTRAST MEDICATION: Performed by: STUDENT IN AN ORGANIZED HEALTH CARE EDUCATION/TRAINING PROGRAM

## 2023-10-12 RX ADMIN — GADOTERIDOL 18 ML: 279.3 INJECTION, SOLUTION INTRAVENOUS at 11:15

## 2023-10-13 DIAGNOSIS — K76.89 LIVER CYST: Primary | ICD-10-CM

## 2023-10-13 DIAGNOSIS — K76.0 HEPATIC STEATOSIS: ICD-10-CM

## 2023-11-09 NOTE — TELEPHONE ENCOUNTER
Patient needs a refill on simvastatin (ZOCOR) 40 MG tablet. They need a 90 day supply. Mail order or local pharmacy: [facility]/pharmacy #[id number] [address]o Regalado, [place] - [address] [place] [address]   Phone:  [phone]  Fax:  [phone]    LOV: 7/27/2023    No future appointments.

## 2023-11-10 RX ORDER — SIMVASTATIN 40 MG
40 TABLET ORAL NIGHTLY
Qty: 90 TABLET | Refills: 1 | Status: SHIPPED | OUTPATIENT
Start: 2023-11-10

## 2023-11-13 RX ORDER — PANTOPRAZOLE SODIUM 40 MG/1
TABLET, DELAYED RELEASE ORAL
Qty: 90 TABLET | Refills: 0 | Status: SHIPPED | OUTPATIENT
Start: 2023-11-13

## 2023-11-15 ENCOUNTER — HOSPITAL ENCOUNTER (OUTPATIENT)
Age: 43
Discharge: HOME | End: 2023-11-15
Payer: COMMERCIAL

## 2023-11-15 LAB — CEA SERPL-MCNC: 4 NG/ML (ref 0–5)

## 2023-11-15 PROCEDURE — 36415 COLL VENOUS BLD VENIPUNCTURE: CPT

## 2023-11-15 PROCEDURE — 86301 IMMUNOASSAY TUMOR CA 19-9: CPT

## 2023-11-15 PROCEDURE — 82105 ALPHA-FETOPROTEIN SERUM: CPT

## 2023-11-15 PROCEDURE — 82378 CARCINOEMBRYONIC ANTIGEN: CPT

## 2023-11-15 PROCEDURE — 86682 HELMINTH ANTIBODY: CPT

## 2023-11-17 LAB
AFP-TM SERPL-MCNC: 3.2 UG/L
CANCER AG19-9 SERPL IA-ACNC: 16 U/ML (ref 0–35)

## 2023-11-21 LAB — MISCELLANEOUS LAB TEST ORDER: NORMAL

## 2023-11-27 DIAGNOSIS — R16.0 LIVER MASS, LEFT LOBE: Primary | ICD-10-CM

## 2023-12-07 ENCOUNTER — ANTI-COAG VISIT (OUTPATIENT)
Dept: PHARMACY | Age: 43
End: 2023-12-07

## 2023-12-07 NOTE — PROGRESS NOTES
Pt finished warfarin in June, will resolve coumadin clinic episode.   Erasmo Valdivia, PharmD 3:08 PM EST 12/7/23

## 2024-01-11 SDOH — ECONOMIC STABILITY: INCOME INSECURITY: HOW HARD IS IT FOR YOU TO PAY FOR THE VERY BASICS LIKE FOOD, HOUSING, MEDICAL CARE, AND HEATING?: NOT HARD AT ALL

## 2024-01-11 SDOH — ECONOMIC STABILITY: FOOD INSECURITY: WITHIN THE PAST 12 MONTHS, THE FOOD YOU BOUGHT JUST DIDN'T LAST AND YOU DIDN'T HAVE MONEY TO GET MORE.: NEVER TRUE

## 2024-01-11 SDOH — ECONOMIC STABILITY: FOOD INSECURITY: WITHIN THE PAST 12 MONTHS, YOU WORRIED THAT YOUR FOOD WOULD RUN OUT BEFORE YOU GOT MONEY TO BUY MORE.: NEVER TRUE

## 2024-01-11 ASSESSMENT — PATIENT HEALTH QUESTIONNAIRE - PHQ9
4. FEELING TIRED OR HAVING LITTLE ENERGY: 0
8. MOVING OR SPEAKING SO SLOWLY THAT OTHER PEOPLE COULD HAVE NOTICED. OR THE OPPOSITE, BEING SO FIGETY OR RESTLESS THAT YOU HAVE BEEN MOVING AROUND A LOT MORE THAN USUAL: 0
5. POOR APPETITE OR OVEREATING: 0
10. IF YOU CHECKED OFF ANY PROBLEMS, HOW DIFFICULT HAVE THESE PROBLEMS MADE IT FOR YOU TO DO YOUR WORK, TAKE CARE OF THINGS AT HOME, OR GET ALONG WITH OTHER PEOPLE: 0
SUM OF ALL RESPONSES TO PHQ QUESTIONS 1-9: 0
9. THOUGHTS THAT YOU WOULD BE BETTER OFF DEAD, OR OF HURTING YOURSELF: 0
1. LITTLE INTEREST OR PLEASURE IN DOING THINGS: 0
7. TROUBLE CONCENTRATING ON THINGS, SUCH AS READING THE NEWSPAPER OR WATCHING TELEVISION: 0
SUM OF ALL RESPONSES TO PHQ QUESTIONS 1-9: 0
SUM OF ALL RESPONSES TO PHQ QUESTIONS 1-9: 0
SUM OF ALL RESPONSES TO PHQ9 QUESTIONS 1 & 2: 0
2. FEELING DOWN, DEPRESSED OR HOPELESS: 0
3. TROUBLE FALLING OR STAYING ASLEEP: 0
SUM OF ALL RESPONSES TO PHQ QUESTIONS 1-9: 0
6. FEELING BAD ABOUT YOURSELF - OR THAT YOU ARE A FAILURE OR HAVE LET YOURSELF OR YOUR FAMILY DOWN: 0

## 2024-01-12 ENCOUNTER — TELEMEDICINE (OUTPATIENT)
Dept: FAMILY MEDICINE CLINIC | Age: 44
End: 2024-01-12
Payer: COMMERCIAL

## 2024-01-12 DIAGNOSIS — I10 HYPERTENSION, UNSPECIFIED TYPE: ICD-10-CM

## 2024-01-12 DIAGNOSIS — N52.9 ERECTILE DYSFUNCTION, UNSPECIFIED ERECTILE DYSFUNCTION TYPE: Primary | ICD-10-CM

## 2024-01-12 DIAGNOSIS — N48.9 PENILE ABNORMALITY: ICD-10-CM

## 2024-01-12 PROCEDURE — 99214 OFFICE O/P EST MOD 30 MIN: CPT | Performed by: FAMILY MEDICINE

## 2024-01-12 RX ORDER — SILDENAFIL 100 MG/1
100 TABLET, FILM COATED ORAL DAILY PRN
Qty: 9 TABLET | Refills: 1 | Status: SHIPPED | OUTPATIENT
Start: 2024-01-12

## 2024-01-12 NOTE — PROGRESS NOTES
2024    TELEHEALTH EVALUATION -- Audio/Visual (During COVID-19 public health emergency)    HPI:    Rasta Holt (:  1980) has requested an audio/video evaluation for the following concern(s):    Chief Complaint   Patient presents with    discuss male issues     Pt presents today for the following:    Erectile Dysfunction: States b/c of dialysis doesn't get fully erect, girlfriend has rough and penis head is not going left. Painful. Returns to normal when not erect. Used Viagra in the past.     HTN: Taking Nifedipine 60 mg daily, Lopressor 50 mg BID, Losartan 50 mg daily, and Clonidine 0.3 mg daily in the evening (was taking TID but had hypotension).  Does Dialysis, after meds 130-150/high 80's-90's      Review of Systems    Allergies   Allergen Reactions    Iodinated Contrast Media Hives     All contrast  All contrast      Midodrine Other (See Comments)     B/P elevated & was \"out of control\"-\"I ended up having a heart attack\"    Shellfish Allergy Anaphylaxis and Hives    Bee Pollen     Hydrocodone Itching    Nsaids Other (See Comments)     Does not take due to kidney function issues  Does not take due to kidney function issues  Does not take due to kidney function issues  Does not take due to kidney function issues         PHYSICAL EXAMINATION:  [ INSTRUCTIONS:  \"[x]\" Indicates a positive item  \"[]\" Indicates a negative item  -- DELETE ALL ITEMS NOT EXAMINED]  Vital Signs: (As obtained by patient/caregiver or practitioner observation)    Height  -  5' 11\"           Weight -  191 lb            Blood pressure- 160/93       Heart rate-     Temperature-      Constitutional: [x] Appears well-developed and well-nourished [x] No apparent distress      [] Abnormal-   Mental status  [x] Alert and awake  [x] Oriented to person/place/time [x]Able to follow commands      Eyes:  EOM    [x]  Normal  [] Abnormal-  Sclera  []  Normal  [] Abnormal -         Discharge []  None visible  [] Abnormal -    HENT:   [x]

## 2024-01-28 DIAGNOSIS — R10.32 ACUTE ABDOMINAL PAIN IN LEFT LOWER QUADRANT: ICD-10-CM

## 2024-01-29 RX ORDER — FAMOTIDINE 20 MG/1
20 TABLET, FILM COATED ORAL NIGHTLY PRN
Qty: 90 TABLET | Refills: 1 | Status: SHIPPED | OUTPATIENT
Start: 2024-01-29

## 2024-03-25 RX ORDER — SIMVASTATIN 40 MG
40 TABLET ORAL NIGHTLY
Qty: 90 TABLET | Refills: 1 | Status: SHIPPED | OUTPATIENT
Start: 2024-03-25

## 2024-06-26 DIAGNOSIS — R10.32 ACUTE ABDOMINAL PAIN IN LEFT LOWER QUADRANT: ICD-10-CM

## 2024-06-27 RX ORDER — FAMOTIDINE 20 MG/1
20 TABLET, FILM COATED ORAL NIGHTLY PRN
Qty: 90 TABLET | Refills: 1 | Status: SHIPPED | OUTPATIENT
Start: 2024-06-27

## 2024-08-12 RX ORDER — SIMVASTATIN 40 MG
40 TABLET ORAL NIGHTLY
Qty: 90 TABLET | Refills: 1 | Status: SHIPPED | OUTPATIENT
Start: 2024-08-12

## 2025-06-28 ENCOUNTER — APPOINTMENT (OUTPATIENT)
Dept: CT IMAGING | Age: 45
End: 2025-06-28
Payer: COMMERCIAL

## 2025-06-28 ENCOUNTER — HOSPITAL ENCOUNTER (EMERGENCY)
Age: 45
Discharge: HOME | End: 2025-06-28
Attending: EMERGENCY MEDICINE
Payer: COMMERCIAL

## 2025-06-28 VITALS
HEART RATE: 93 BPM | OXYGEN SATURATION: 94 % | HEIGHT: 71 IN | BODY MASS INDEX: 29.23 KG/M2 | RESPIRATION RATE: 15 BRPM | TEMPERATURE: 97.7 F | WEIGHT: 208.8 LBS | DIASTOLIC BLOOD PRESSURE: 82 MMHG | SYSTOLIC BLOOD PRESSURE: 111 MMHG

## 2025-06-28 DIAGNOSIS — R10.12 LEFT UPPER QUADRANT ABDOMINAL PAIN: ICD-10-CM

## 2025-06-28 DIAGNOSIS — R10.9 LEFT FLANK PAIN: Primary | ICD-10-CM

## 2025-06-28 LAB
ALBUMIN SERPL-MCNC: 4.6 G/DL (ref 3.4–5)
ALBUMIN/GLOB SERPL: 1.6 {RATIO} (ref 1.1–2.2)
ALP SERPL-CCNC: 115 U/L (ref 40–129)
ALT SERPL-CCNC: 32 U/L (ref 10–40)
ANION GAP SERPL CALCULATED.3IONS-SCNC: 13 MMOL/L (ref 3–16)
AST SERPL-CCNC: 22 U/L (ref 15–37)
BASOPHILS # BLD: 0 K/UL (ref 0–0.2)
BASOPHILS NFR BLD: 0.7 %
BILIRUB SERPL-MCNC: 0.9 MG/DL (ref 0–1)
BUN SERPL-MCNC: 13 MG/DL (ref 7–20)
CALCIUM SERPL-MCNC: 9.9 MG/DL (ref 8.3–10.6)
CHLORIDE SERPL-SCNC: 108 MMOL/L (ref 99–110)
CO2 SERPL-SCNC: 21 MMOL/L (ref 21–32)
CREAT SERPL-MCNC: 1.3 MG/DL (ref 0.9–1.3)
DEPRECATED RDW RBC AUTO: 14.1 % (ref 12.4–15.4)
EOSINOPHIL # BLD: 0 K/UL (ref 0–0.6)
EOSINOPHIL NFR BLD: 0.7 %
GFR SERPLBLD CREATININE-BSD FMLA CKD-EPI: 70 ML/MIN/{1.73_M2}
GLUCOSE SERPL-MCNC: 100 MG/DL (ref 70–99)
HCT VFR BLD AUTO: 49.1 % (ref 40.5–52.5)
HGB BLD-MCNC: 17.1 G/DL (ref 13.5–17.5)
LIPASE SERPL-CCNC: 45 U/L (ref 13–60)
LYMPHOCYTES # BLD: 1.1 K/UL (ref 1–5.1)
LYMPHOCYTES NFR BLD: 16.2 %
MCH RBC QN AUTO: 29.7 PG (ref 26–34)
MCHC RBC AUTO-ENTMCNC: 34.8 G/DL (ref 31–36)
MCV RBC AUTO: 85.3 FL (ref 80–100)
MONOCYTES # BLD: 0.6 K/UL (ref 0–1.3)
MONOCYTES NFR BLD: 8.8 %
NEUTROPHILS # BLD: 5.2 K/UL (ref 1.7–7.7)
NEUTROPHILS NFR BLD: 73.6 %
PLATELET # BLD AUTO: 233 K/UL (ref 135–450)
PMV BLD AUTO: 7.3 FL (ref 5–10.5)
POTASSIUM SERPL-SCNC: 4 MMOL/L (ref 3.5–5.1)
PROT SERPL-MCNC: 7.4 G/DL (ref 6.4–8.2)
RBC # BLD AUTO: 5.76 M/UL (ref 4.2–5.9)
SODIUM SERPL-SCNC: 142 MMOL/L (ref 136–145)
WBC # BLD AUTO: 7.1 K/UL (ref 4–11)

## 2025-06-28 PROCEDURE — 83690 ASSAY OF LIPASE: CPT

## 2025-06-28 PROCEDURE — 74176 CT ABD & PELVIS W/O CONTRAST: CPT

## 2025-06-28 PROCEDURE — 80053 COMPREHEN METABOLIC PANEL: CPT

## 2025-06-28 PROCEDURE — 85025 COMPLETE CBC W/AUTO DIFF WBC: CPT

## 2025-06-28 PROCEDURE — 96374 THER/PROPH/DIAG INJ IV PUSH: CPT

## 2025-06-28 PROCEDURE — 6360000002 HC RX W HCPCS: Performed by: EMERGENCY MEDICINE

## 2025-06-28 PROCEDURE — 99284 EMERGENCY DEPT VISIT MOD MDM: CPT

## 2025-06-28 PROCEDURE — 96375 TX/PRO/DX INJ NEW DRUG ADDON: CPT

## 2025-06-28 RX ORDER — PREDNISONE 5 MG/1
5 TABLET ORAL DAILY
COMMUNITY
Start: 2025-02-17

## 2025-06-28 RX ORDER — MYCOPHENOLATE MOFETIL 250 MG/1
250 CAPSULE ORAL 2 TIMES DAILY
COMMUNITY
Start: 2025-02-11

## 2025-06-28 RX ORDER — LAMOTRIGINE 100 MG/1
100 TABLET ORAL DAILY
COMMUNITY
Start: 2025-06-06

## 2025-06-28 RX ORDER — ONDANSETRON 2 MG/ML
4 INJECTION INTRAMUSCULAR; INTRAVENOUS ONCE
Status: COMPLETED | OUTPATIENT
Start: 2025-06-28 | End: 2025-06-28

## 2025-06-28 RX ORDER — MORPHINE SULFATE 4 MG/ML
4 INJECTION, SOLUTION INTRAMUSCULAR; INTRAVENOUS ONCE
Refills: 0 | Status: COMPLETED | OUTPATIENT
Start: 2025-06-28 | End: 2025-06-28

## 2025-06-28 RX ORDER — ARIPIPRAZOLE 10 MG/1
10 TABLET ORAL DAILY
COMMUNITY
Start: 2025-05-01

## 2025-06-28 RX ORDER — CYCLOBENZAPRINE HCL 10 MG
10 TABLET ORAL 3 TIMES DAILY PRN
Qty: 30 TABLET | Refills: 0 | Status: SHIPPED | OUTPATIENT
Start: 2025-06-28 | End: 2025-07-08

## 2025-06-28 RX ORDER — ACETAMINOPHEN 500 MG
1000 TABLET ORAL EVERY 6 HOURS PRN
Qty: 60 TABLET | Refills: 0 | Status: SHIPPED | OUTPATIENT
Start: 2025-06-28

## 2025-06-28 RX ORDER — DIPHENHYDRAMINE HYDROCHLORIDE 50 MG/ML
25 INJECTION, SOLUTION INTRAMUSCULAR; INTRAVENOUS ONCE
Status: DISCONTINUED | OUTPATIENT
Start: 2025-06-28 | End: 2025-06-28 | Stop reason: HOSPADM

## 2025-06-28 RX ADMIN — MORPHINE SULFATE 4 MG: 4 INJECTION, SOLUTION INTRAMUSCULAR; INTRAVENOUS at 17:36

## 2025-06-28 RX ADMIN — ONDANSETRON 4 MG: 2 INJECTION, SOLUTION INTRAMUSCULAR; INTRAVENOUS at 16:53

## 2025-06-28 ASSESSMENT — PAIN DESCRIPTION - LOCATION: LOCATION: ABDOMEN

## 2025-06-28 ASSESSMENT — PAIN DESCRIPTION - DESCRIPTORS: DESCRIPTORS: BURNING;STABBING

## 2025-06-28 ASSESSMENT — LIFESTYLE VARIABLES
HOW OFTEN DO YOU HAVE A DRINK CONTAINING ALCOHOL: MONTHLY OR LESS
HOW MANY STANDARD DRINKS CONTAINING ALCOHOL DO YOU HAVE ON A TYPICAL DAY: 1 OR 2

## 2025-06-28 ASSESSMENT — PAIN DESCRIPTION - ORIENTATION: ORIENTATION: LEFT

## 2025-06-28 ASSESSMENT — PAIN SCALES - GENERAL
PAINLEVEL_OUTOF10: 8
PAINLEVEL_OUTOF10: 2

## 2025-06-28 ASSESSMENT — PAIN - FUNCTIONAL ASSESSMENT: PAIN_FUNCTIONAL_ASSESSMENT: 0-10

## (undated) DEVICE — SUTURE MCRYL + SZ 4-0 L18IN ABSRB UD L19MM PS-2 3/8 CIR MCP496G

## (undated) DEVICE — FLEX ADVANTAGE 3000CC: Brand: FLEX ADVANTAGE

## (undated) DEVICE — SUTURE ETHBND EXCEL SZ 0 L18IN NONABSORBABLE GRN L26MM MO-6 CX45D

## (undated) DEVICE — GLOVE,SURG,SENSICARE SLT,LF,PF,7: Brand: MEDLINE

## (undated) DEVICE — SUTURE VCRL + SZ 3-0 L18IN ABSRB UD SH 1/2 CIR TAPERCUT NDL VCP864D

## (undated) DEVICE — MINOR SET UP: Brand: MEDLINE INDUSTRIES, INC.

## (undated) DEVICE — SHEET, T, LAPAROTOMY, STERILE: Brand: MEDLINE

## (undated) DEVICE — CHLORAPREP 26ML ORANGE